# Patient Record
Sex: FEMALE | Race: BLACK OR AFRICAN AMERICAN | Employment: UNEMPLOYED | URBAN - METROPOLITAN AREA
[De-identification: names, ages, dates, MRNs, and addresses within clinical notes are randomized per-mention and may not be internally consistent; named-entity substitution may affect disease eponyms.]

---

## 2017-11-01 ENCOUNTER — HOSPITAL ENCOUNTER (EMERGENCY)
Age: 37
Discharge: HOME | End: 2017-11-01
Attending: EMERGENCY MEDICINE
Payer: COMMERCIAL

## 2017-11-01 VITALS
OXYGEN SATURATION: 97 % | SYSTOLIC BLOOD PRESSURE: 121 MMHG | DIASTOLIC BLOOD PRESSURE: 95 MMHG | TEMPERATURE: 98.3 F | RESPIRATION RATE: 16 BRPM | HEART RATE: 69 BPM | HEIGHT: 66 IN | BODY MASS INDEX: 34.55 KG/M2 | WEIGHT: 215 LBS

## 2017-11-01 DIAGNOSIS — B37.2 CANDIDAL INTERTRIGO: ICD-10-CM

## 2017-11-01 DIAGNOSIS — L50.9 URTICARIA: Primary | ICD-10-CM

## 2017-11-01 LAB
CLUE CELLS VAG QL WET PREP: NORMAL
KOH PREP SPEC: NORMAL
SERVICE CMNT-IMP: NORMAL
T VAGINALIS VAG QL WET PREP: NORMAL

## 2017-11-01 PROCEDURE — 87491 CHLMYD TRACH DNA AMP PROBE: CPT | Performed by: PHYSICIAN ASSISTANT

## 2017-11-01 PROCEDURE — 99283 EMERGENCY DEPT VISIT LOW MDM: CPT

## 2017-11-01 PROCEDURE — 87210 SMEAR WET MOUNT SALINE/INK: CPT | Performed by: PHYSICIAN ASSISTANT

## 2017-11-01 RX ORDER — NYSTATIN 100000 [USP'U]/G
POWDER TOPICAL 3 TIMES DAILY
Qty: 30 G | Refills: 0 | Status: SHIPPED | OUTPATIENT
Start: 2017-11-01 | End: 2019-01-24 | Stop reason: SDUPTHER

## 2017-11-01 RX ORDER — HYDROCORTISONE 25 MG/G
CREAM TOPICAL 2 TIMES DAILY
Qty: 30 G | Refills: 0 | Status: SHIPPED | OUTPATIENT
Start: 2017-11-01 | End: 2020-03-19

## 2017-11-01 NOTE — DISCHARGE INSTRUCTIONS
Yeast Skin Infection: Care Instructions  Your Care Instructions    Yeast normally lives on your skin. Sometimes too much yeast can overgrow in certain areas of the skin and cause an infection. The infection causes red, scaly, moist patches on your skin that may itch. Common areas for skin yeast infections are skin folds under the breasts or belly area. The warm and moist areas in the skin folds can make it easier for yeast to overgrow. Yeast infections also can be found on other parts of the body such as the groin or armpits. You will probably get a cream or ointment that contains an antifungal medicine. Examples of these are miconazole and clotrimazole. You put it on your skin to treat the infection. Your doctor may give you a prescription for the cream or ointment. Or you may be able to buy it without a prescription at most drugstores. If the infection is severe, the doctor will prescribe antifungal pills. A yeast infection usually goes away after about a week of treatment. But it's important to use the medicine for as long as your doctor tells you to. Follow-up care is a key part of your treatment and safety. Be sure to make and go to all appointments, and call your doctor if you are having problems. It's also a good idea to know your test results and keep a list of the medicines you take. How can you care for yourself at home? · Be safe with medicines. Take your medicines exactly as prescribed. Call your doctor if you think you are having a problem with your medicine. · Keep your skin clean and dry. Your doctor may suggest using powder that contains an antifungal medicine in the skin folds. · Wear loose clothing. When should you call for help? Call your doctor now or seek immediate medical care if:  ? · You have symptoms of infection, such as:  ¨ Increased pain, swelling, warmth, or redness. ¨ Red streaks leading from the area. ¨ Pus draining from the area. ¨ A fever. ? Watch closely for changes in your health, and be sure to contact your doctor if:  ? · You do not get better as expected. Where can you learn more? Go to http://sarmad-farhana.info/. Enter K147 in the search box to learn more about \"Yeast Skin Infection: Care Instructions. \"  Current as of: October 13, 2016  Content Version: 11.4  © 8083-3639 [employer]. Care instructions adapted under license by [employer] (which disclaims liability or warranty for this information). If you have questions about a medical condition or this instruction, always ask your healthcare professional. [address] any warranty or liability for your use of this information.

## 2017-11-01 NOTE — ED TRIAGE NOTES
Pt c/o rash to groin x 1 month, minimal relief with hydrocortisone cream. +intermittent itching and clothing irritation. Pt also notes an area to her left neck with localized swelling and redness x 1 day. Afebrile.

## 2017-11-01 NOTE — ED PROVIDER NOTES
HPI Comments: 40 y/o female with PMHx of anemia, cholelithiasis, seizures, scoliosis and depression, presenting with complaint of rash. She reports a 1 month history of a bilateral groin itchy rash. She states she has had a similar rash in the past and was told to use hydrocortisone cream, but this time the cream has not been helping. She has also tried different lotions and switching laundry detergents, with no relief. The rash is not painful. She denies vaginal itching, vaginal pain, vaginal discharge, dysuria, hematuria or urgency/frequency. The patient also complains of an itchy rash on the left side of her neck, which she first noticed yesterday. She does not recall any recent insect bites. The rash on her neck is not painful, and it has improved somewhat since onset yesterday. No fevers, chills, shortness of breath, cough, abd pain, nausea or vomiting. The history is provided by the patient. Past Medical History:   Diagnosis Date    Depression     Ill-defined condition     scoliosis, gall stones, anemia    Seizures ([address]        Past Surgical History:   Procedure Laterality Date    HX  SECTION      x 4    HX HERNIA REPAIR      HX TUBAL LIGATION           History reviewed. No pertinent family history. Social History     Social History    Marital status: SINGLE     Spouse name: N/A    Number of children: N/A    Years of education: N/A     Occupational History    Not on file. Social History Main Topics    Smoking status: Never Smoker    Smokeless tobacco: Not on file    Alcohol use No    Drug use: No    Sexual activity: Not on file     Other Topics Concern    Not on file     Social History Narrative         ALLERGIES: Other food; Dilaudid [hydromorphone (bulk)]; Morphine; Tramadol; and Percocet [oxycodone-acetaminophen]    Review of Systems   Constitutional: Negative for chills and fever. Respiratory: Negative for cough and shortness of breath.     Cardiovascular: Negative for chest pain. Gastrointestinal: Negative for abdominal pain, nausea and vomiting. Genitourinary: Negative for dysuria, frequency, hematuria, urgency, vaginal bleeding, vaginal discharge and vaginal pain. Musculoskeletal: Negative for myalgias. Skin: Positive for rash. Neurological: Negative for syncope, light-headedness and headaches. All other systems reviewed and are negative. Vitals:    11/01/17 0850   BP: 114/75   Pulse: 63   Resp: 14   Temp: 98.3 °F (36.8 °C)   SpO2: 100%   Weight: 97.5 kg (215 lb)   Height: 5' 6\" (1.676 m)            Physical Exam   Constitutional: She is oriented to person, place, and time. She appears well-developed and well-nourished. No distress. HENT:   Head: Normocephalic and atraumatic. Eyes: Conjunctivae and EOM are normal.   Neck: Normal range of motion. Neck supple. Cardiovascular: Normal rate. Pulmonary/Chest: Effort normal. No respiratory distress. Genitourinary: Uterus normal. There is no tenderness or lesion on the right labia. There is no tenderness or lesion on the left labia. Cervix exhibits no motion tenderness and no discharge. Right adnexum displays no mass, no tenderness and no fullness. Left adnexum displays no mass, no tenderness and no fullness. No erythema, tenderness or bleeding in the vagina. No foreign body in the vagina. No signs of injury around the vagina. No vaginal discharge found. Musculoskeletal: Normal range of motion. Neurological: She is alert and oriented to person, place, and time. Skin: Skin is warm and dry. She is not diaphoretic. Left side of neck with few urticaria, non-tender to palpation. No fluctuance, induration, drainage, excoriations, ulcers or vesicles. Groin with diffuse erythema/hyperpigmentation, few scattered papules, excoriations present. No tenderness to palpation, swelling, induration, fluctuance, drainage, ulcers or vesicles. Nursing note and vitals reviewed.        MDM  Number of Diagnoses or Management Options  Candidal intertrigo:   Urticaria:      Amount and/or Complexity of Data Reviewed  Clinical lab tests: ordered and reviewed    Patient Progress  Patient progress: stable    ED Course       Procedures    38 y/o female with PMHx of anemia, cholelithiasis, seizures, scoliosis and depression, presenting with complaint of rash. History and exam as above - urticarial rash on neck consistent with insect bite vs localized allergic reaction to unknown allergen. Groin rash most consistent with candidiasis. Will send KOH, wet prep, GC/Chlamydia. KOH negative, wet prep negative for BV or trichomonas. Safe for discharge home, with Rx for Nystatin powder and hydrocortisone cream for neck. Recommended PCP follow up as needed. Strict ED return precautions discussed and provided in writing at time of discharge. The patient verbalized understanding and agreement with this plan.

## 2017-11-01 NOTE — LETTER
[address] 
[facility] EMERGENCY DEPT 
[address] [facility] [address] [id number] [phone] Work/School Note Date: 11/1/2017 To Whom It May concern: 
 
Cory Owens was seen and treated today in the emergency room by the following provider(s): 
Attending Provider: Anibal Urias MD 
Physician Assistant: JOHN Briggs. Cory Owens may return to work on 11/2/17. Sincerely, JOHN Briggs

## 2017-11-03 LAB
C TRACH DNA SPEC QL NAA+PROBE: NEGATIVE
N GONORRHOEA DNA SPEC QL NAA+PROBE: NEGATIVE
SAMPLE TYPE: NORMAL
SERVICE CMNT-IMP: NORMAL
SPECIMEN SOURCE: NORMAL

## 2018-09-14 ENCOUNTER — APPOINTMENT (OUTPATIENT)
Dept: MRI IMAGING | Age: 38
End: 2018-09-14
Attending: FAMILY MEDICINE
Payer: COMMERCIAL

## 2018-09-14 ENCOUNTER — HOSPITAL ENCOUNTER (EMERGENCY)
Age: 38
Discharge: HOME | End: 2018-09-14
Attending: EMERGENCY MEDICINE
Payer: COMMERCIAL

## 2018-09-14 ENCOUNTER — HOSPITAL ENCOUNTER (EMERGENCY)
Age: 38
Discharge: STILL ADMITTED OR ADMINISTRATIVE | End: 2018-09-14
Attending: EMERGENCY MEDICINE
Payer: COMMERCIAL

## 2018-09-14 VITALS
BODY MASS INDEX: 35.36 KG/M2 | TEMPERATURE: 98.2 F | OXYGEN SATURATION: 99 % | WEIGHT: 220 LBS | SYSTOLIC BLOOD PRESSURE: 120 MMHG | RESPIRATION RATE: 16 BRPM | HEIGHT: 66 IN | HEART RATE: 64 BPM | DIASTOLIC BLOOD PRESSURE: 70 MMHG

## 2018-09-14 DIAGNOSIS — H20.9 ANTERIOR UVEITIS: Primary | ICD-10-CM

## 2018-09-14 DIAGNOSIS — H57.13 PAIN OF BOTH EYES: ICD-10-CM

## 2018-09-14 PROCEDURE — A9575 INJ GADOTERATE MEGLUMI 0.1ML: HCPCS | Performed by: RADIOLOGY

## 2018-09-14 PROCEDURE — 70553 MRI BRAIN STEM W/O & W/DYE: CPT

## 2018-09-14 PROCEDURE — 99283 EMERGENCY DEPT VISIT LOW MDM: CPT

## 2018-09-14 PROCEDURE — 75810000275 HC EMERGENCY DEPT VISIT NO LEVEL OF CARE

## 2018-09-14 PROCEDURE — 74011250637 HC RX REV CODE- 250/637: Performed by: EMERGENCY MEDICINE

## 2018-09-14 PROCEDURE — 74011250636 HC RX REV CODE- 250/636: Performed by: RADIOLOGY

## 2018-09-14 RX ORDER — GADOTERATE MEGLUMINE 376.9 MG/ML
19 INJECTION INTRAVENOUS
Status: COMPLETED | OUTPATIENT
Start: 2018-09-14 | End: 2018-09-14

## 2018-09-14 RX ORDER — HYDROCODONE BITARTRATE AND ACETAMINOPHEN 7.5; 325 MG/15ML; MG/15ML
5 SOLUTION ORAL ONCE
Status: COMPLETED | OUTPATIENT
Start: 2018-09-14 | End: 2018-09-14

## 2018-09-14 RX ADMIN — HYDROCODONE BITARTRATE AND ACETAMINOPHEN 5 MG: 7.5; 325 SOLUTION ORAL at 15:10

## 2018-09-14 RX ADMIN — GADOTERATE MEGLUMINE 19 ML: 376.9 INJECTION INTRAVENOUS at 19:33

## 2018-09-14 NOTE — LETTER
NOTIFICATION RETURN TO WORK / SCHOOL 
 
9/14/2018 8:29 PM 
 
Ms. Socorro Wall [address] [place] [id number] To Whom It May Concern: 
 
Socorro Wall is currently under the care of [facility] EMERGENCY DEPT. Please forgive any absence on 9/14/18 and 9/17/18. If there are questions or concerns please have the patient contact our office. Sincerely, Srinivasan Mendoza MD

## 2018-09-14 NOTE — ED NOTES
Patient with bilateral eye pain and loss of vision - worse in the right. Evaluated at [facility] with dilated exam and sent in for stat MRI of brain and MRI of orbit by Dr. Flora Steinberg for optic neuritis and sent from MRI to the ED because they did not have an order/scheduled appt. 
 

## 2018-09-14 NOTE — ED PROVIDER NOTES
HPI Comments: Since  patient had development of eye pain and changes in vision of the right eye. Noting pain in both eyes, but pain worst in the right eye and changes in the vision in the right eye only. Pain exacerbated with palpation and with movement of the right eye, and bright lights. Denies fevers, chills, neck pain, ear pain, sore throat. Denies any trauma, sick contacts. Denies history of diabetes or inflammatory disease. 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 Discussed with Ophthamologist (Dr. Christian Monge). Highest suspicion for anterior Uveitis though with pain component needs to rule out MS and optic Neuritis. Wanting MRI of the Brain and Orbit w/wo contrast.  If no other pathology patient prescribed topical steroid. Patient is a 40 y.o. female presenting with eye pain. The history is provided by the patient. Eye Pain This is a new problem. Episode onset: 1 week ago. The problem occurs constantly. The problem has been gradually worsening. Both (Right worse than left) eyes are affected. The injury mechanism was none. The pain is at a severity of 9/10 (Pressure pain). There is no history of trauma to the eye. There is no known exposure to pink eye. Associated symptoms include photophobia and pain. Pertinent negatives include no discharge, no nausea, no vomiting, no weakness and no fever. Past Medical History:  
Diagnosis Date  Depression  Ill-defined condition   
 scoliosis, gall stones, anemia  Seizures ([address] Past Surgical History:  
Procedure Laterality Date  HX  SECTION    
 x 4  
 HX HERNIA REPAIR    
 HX TUBAL LIGATION No family history on file. Social History Social History  Marital status: SINGLE Spouse name: N/A  
 Number of children: N/A  
 Years of education: N/A Occupational History  Not on file. Social History Main Topics  Smoking status: Never Smoker  Smokeless tobacco: Not on file  Alcohol use No  
 Drug use: No  
 Sexual activity: Not on file Other Topics Concern  Not on file Social History Narrative ALLERGIES: Other food; Dilaudid [hydromorphone (bulk)]; Morphine; Tramadol; and Percocet [oxycodone-acetaminophen] Review of Systems Constitutional: Negative for chills, fatigue and fever. HENT: Negative for congestion. Eyes: Positive for photophobia, pain and visual disturbance. Negative for discharge. Cardiovascular: Negative for chest pain. Gastrointestinal: Negative for abdominal pain, nausea and vomiting. Genitourinary: Negative for dysuria. Musculoskeletal: Negative for arthralgias. Skin: Negative for rash. Neurological: Positive for headaches. Negative for seizures, weakness and light-headedness. Psychiatric/Behavioral: Negative for confusion and hallucinations. Vitals:  
 09/14/18 1426 BP: 111/76 Pulse: 66 Resp: 16 Temp: 98.2 °F (36.8 °C) SpO2: 99% Weight: 99.8 kg (220 lb) Height: 5' 6\" (1.676 m) Physical Exam  
Constitutional: She is oriented to person, place, and time. She appears well-developed and well-nourished. No distress. HENT:  
Head: Normocephalic and atraumatic. Right Ear: Tympanic membrane and ear canal normal.  
Left Ear: Tympanic membrane and ear canal normal.  
Nose: Right sinus exhibits no maxillary sinus tenderness and no frontal sinus tenderness. Left sinus exhibits no maxillary sinus tenderness and no frontal sinus tenderness. Mouth/Throat: Oropharynx is clear and moist.  
Eyes: Conjunctivae, EOM and lids are normal.  
Fundoscopic exam: The right eye shows no papilledema. The left eye shows no papilledema. Eyes previously dilated. Decreased visual acuity right compared to left, Can see objects with right Neck: Normal range of motion. Neck supple.   
Cardiovascular: Normal rate, regular rhythm, normal heart sounds and intact distal pulses. Exam reveals no gallop and no friction rub. No murmur heard. Pulmonary/Chest: Effort normal and breath sounds normal.  
Abdominal: Soft. Bowel sounds are normal. There is no tenderness. Lymphadenopathy:  
  She has no cervical adenopathy. Neurological: She is alert and oriented to person, place, and time. No sensory deficits except for blurred vision Skin: Skin is warm and dry. Nursing note and vitals reviewed. MDM Number of Diagnoses or Management Options Anterior uveitis:  
Pain of both eyes:  
Diagnosis management comments: Rule out MS/Optic Neuritis. Ordering MRI brain with view of Orbits now after discussion with Dr. Sally Reyna (ophthamology). MRI with no acute pathology related to optic nerve, acute orbital pathology. Noting some hyperintensities in the left Parietal white matter. Reviewed with Radiology and stated not an uncommon finding in younger patients, not symptomatic. At this time no recommended follow up imaging or specialist follow up related to findings. Patient Discharged with topical steroid per Ophthamologist and recommended close follow up with PCP and Ophthamologist. 
 
Patient's results have been reviewed with them. Patient and/or family have verbally conveyed their understanding and agreement of the patient's signs, symptoms, diagnosis, treatment and prognosis and additionally agree to follow up as recommended or return to the Emergency Room should their condition change prior to follow-up. Discharge instructions have also been provided to the patient with some educational information regarding their diagnosis as well a list of reasons why they would want to return to the ER prior to their follow-up appointment should their condition change. Amount and/or Complexity of Data Reviewed Tests in the radiology section of CPT®: ordered and reviewed (MRI Brain) Discussion of test results with the performing providers: yes (MRI) Obtain history from someone other than the patient: yes (Dr. Pascale Noble) Discuss the patient with other providers: yes (Dr. Pascale Noble (ophthamology)) Risk of Complications, Morbidity, and/or Mortality Presenting problems: high Diagnostic procedures: moderate ED Course Procedures

## 2018-09-14 NOTE — ED TRIAGE NOTES
Pt with bilateral eye pain x 1 week. Pt was seen at [facility] for the same and followed up opthalo today. Pt sent to ER for stat MRI for optic neuritis.

## 2018-09-15 NOTE — DISCHARGE INSTRUCTIONS
We hope that we have addressed all of your medical concerns. The examination and treatment you received in the Emergency Department were for an emergent problem and were not intended as complete care. It is important that you follow up with your healthcare provider(s) for ongoing care. If your symptoms worsen or do not improve as expected, and you are unable to reach your usual health care provider(s), you should return to the Emergency Department. Today's healthcare is undergoing tremendous change, and patient satisfaction surveys are one of the many tools to assess the quality of medical care. You may receive a survey from the [employer] organization regarding your experience in the Emergency Department. I hope that your experience has been completely positive, particularly the medical care that I provided. As such, please participate in the survey; anything less than excellent does not meet my expectations or intentions. Thank you for allowing us to provide you with medical care today. We realize that you have many choices for your emergency care needs. Please choose us in the future for any continued health care needs. No results found for this or any previous visit (from the past 24 hour(s)). Mri Brain W Wo Cont    Result Date: 9/14/2018  EXAM: MRI BRAIN INDICATION:   Include scan of orbits bilaterally. Rule out MS and Optic Neuritis per Ophthalmology   Vision loss SEQUENCES:   Sagittal T1, axial T1, T2, GRE and FLAIR; axial and coronal T1 post enhancement; and diffusion imaging of the brain. 19 mL of Dotarem. Thin section imaging is also performed through the orbits with and without contrast. FINDINGS: Orbital soft tissues, optic nerves and chiasm are normal in size, morphology, signal intensity and enhancement. There are a few punctate white matter foci of T2 hyperintensity in the left parietal lobe, nonspecific.  Remainder of the brain including corpus callosum and pericallosal white matter show normal signal intensity. The enhancement is normal.  There is no evidence for mass, hemorrhage, shift, or hydrocephalus. There is no extra-axial fluid collection. Diffusion imaging shows no diffusion restriction to indicate acute infarct/ischemia or other process. IMPRESSION: Few nonspecific left parietal white matter T2 hyperintensities. Normal appearance of the orbits. Normal diffusion and enhancement. Uveitis: Care Instructions  Your Care Instructions    Uveitis (say \"you-vee-EYE-tus\") is swelling and tenderness of the middle layer of the eye. This area includes the colored part of the eye (iris), muscles, and blood vessels. One or both of your eyes may be swollen, red, and painful. You may have blurred vision. You may have gotten uveitis from an infection, but the cause is often not known. There are three types of uveitis. · Anterior uveitis is the most common type. This is pain and swelling of the front part of the eye. It is treated with eyedrops or ointment and usually lasts less than 6 weeks. · Intermediate uveitis affects the middle of the eye. It may be treated with eyedrops or with medicine given in a shot. It usually lasts longer than 6 weeks. · Posterior uveitis is the least common type. It affects the back of the eye. This is usually treated with medicine. It can last from a few weeks to a few years. It is important to treat uveitis. Treatment can save your eyesight and avoid permanent damage to your eyes. Follow-up care is a key part of your treatment and safety. Be sure to make and go to all appointments, and call your doctor if you are having problems. It's also a good idea to know your test results and keep a list of the medicines you take. How can you care for yourself at home? · Take your medicines exactly as prescribed. Call your doctor if you have any problems with your medicine.  You will get more details on the specific medicines your doctor prescribes. · Use any prescribed eyedrops or ointments exactly as your doctor told you to. · Keep the eyedropper or bottle tip clean. · If you are using eyedrops and an ointment, put in the eyedrops before you use the ointment. · To put in eyedrops or ointment:  ¨ Tilt your head back, and pull your lower eyelid down with one finger. ¨ Drop or squirt the medicine inside the lower lid. ¨ Close your eye for 30 to 60 seconds to let the drops or ointment move around. ¨ Do not touch the ointment or dropper tip to your eyelashes or any other surface. · Wear sunglasses if light hurts your eyes. · Do not wear contact lenses until your eyes have healed. · Do not wear eye makeup until your eyes have healed. · Do not drive if you have blurred vision. When should you call for help? Call your doctor now or seek immediate medical care if:    · You have signs of an eye infection, such as:  ¨ Pus or thick discharge coming from the eye. ¨ Redness or swelling around the eye. ¨ A fever.     · You have new or worse eye pain.     · You have vision changes.     · It feels like there is something in your eye.     · Light hurts your eye.    Watch closely for changes in your health, and be sure to contact your doctor if:    · You do not get better as expected. Where can you learn more? Go to http://sarmad-farhana.info/. Enter K211 in the search box to learn more about \"Uveitis: Care Instructions. \"  Current as of: December 3, 2017  Content Version: 11.7  © 1049-3058 [employer]. Care instructions adapted under license by [employer] (which disclaims liability or warranty for this information). If you have questions about a medical condition or this instruction, always ask your healthcare professional. [address] any warranty or liability for your use of this information.

## 2018-09-15 NOTE — ED NOTES
Provided with DC instructions by provider. Verbalized understanding. No IV in place upon this RN entering room. Gauze and tape noted to L hand. PT reports, Conchita Morin already took it out\". VS updated as charted. Ambulated out of ED with steady upright gait

## 2018-10-08 ENCOUNTER — OFFICE VISIT (OUTPATIENT)
Dept: NEUROLOGY | Age: 38
End: 2018-10-08

## 2018-10-08 VITALS
WEIGHT: 226 LBS | SYSTOLIC BLOOD PRESSURE: 108 MMHG | DIASTOLIC BLOOD PRESSURE: 68 MMHG | OXYGEN SATURATION: 95 % | HEART RATE: 79 BPM | BODY MASS INDEX: 36.48 KG/M2

## 2018-10-08 DIAGNOSIS — G43.111 INTRACTABLE MIGRAINE WITH AURA WITH STATUS MIGRAINOSUS: Primary | ICD-10-CM

## 2018-10-08 RX ORDER — PROPRANOLOL HYDROCHLORIDE 10 MG/1
10 TABLET ORAL
Refills: 4 | COMMUNITY
Start: 2018-09-19 | End: 2020-03-19

## 2018-10-08 RX ORDER — BUPROPION HYDROCHLORIDE 100 MG/1
100 TABLET, EXTENDED RELEASE ORAL 2 TIMES DAILY
Refills: 1 | COMMUNITY
Start: 2018-09-19 | End: 2019-08-19

## 2018-10-08 RX ORDER — INDOMETHACIN 50 MG/1
50 CAPSULE ORAL 3 TIMES DAILY
Qty: 90 CAP | Refills: 2 | Status: SHIPPED | OUTPATIENT
Start: 2018-10-08 | End: 2018-10-09 | Stop reason: SDUPTHER

## 2018-10-08 RX ORDER — PREDNISOLONE ACETATE 10 MG/ML
1 SUSPENSION/ DROPS OPHTHALMIC
Refills: 1 | COMMUNITY
Start: 2018-09-14 | End: 2019-01-24

## 2018-10-08 RX ORDER — BECLOMETHASONE DIPROPIONATE HFA 40 UG/1
40 AEROSOL, METERED RESPIRATORY (INHALATION) 2 TIMES DAILY
Refills: 1 | COMMUNITY
Start: 2018-09-19 | End: 2020-03-19

## 2018-10-08 NOTE — PATIENT INSTRUCTIONS
[facility]   Statement to Patients  April 1, 2014      In an effort to ensure the large volume of patient prescription refills is processed in the most efficient and expeditious manner, we are asking our patients to assist us by calling your Pharmacy for all prescription refills, this will include also your  Mail Order Pharmacy. The pharmacy will contact our office electronically to continue the refill process. Please do not wait until the last minute to call your pharmacy. We need at least 48 hours (2days) to fill prescriptions. We also encourage you to call your pharmacy before going to  your prescription to make sure it is ready. With regard to controlled substance prescription refill requests (narcotic refills) that need to be picked up at our office, we ask your cooperation by providing us with at least 72 hours (3days) notice that you will need a refill. We will not refill narcotic prescription refill requests after 4:00pm on any weekday, Monday through Thursday, or after 2:00pm on Fridays, or on the weekends. We encourage everyone to explore another way of getting your prescription refill request processed using Central Desktop, our patient web portal through our electronic medical record system. Central Desktop is an efficient and effective way to communicate your medication request directly to the office and  downloadable as an jono on your smart phone . [employer] also features a review functionality that allows you to view your medication list as well as leave messages for your physician. Are you ready to get connected? If so please review the attatched instructions or speak to any of our staff to get you set up right away! Thank you so much for your cooperation. Should you have any questions please contact our Practice Administrator.     The Physicians and Staff,  [facility]

## 2018-10-08 NOTE — LETTER
10/8/2018 2:57 PM 
 
Patient:  Gala Leonard YOB: 1980 Date of Visit: 10/8/2018 Dear MD [facility] [address] [place] [id number] VIA Facsimile: [phone] 
 : 
 
 
Thank you for referring Ms. Gala Leonard to me for evaluation/treatment. Below are the relevant portions of my assessment and plan of care. If you have questions, please do not hesitate to call me. I look forward to following Ms. Roxanne Wood along with you. Sincerely, Brenden Vargas MD

## 2018-10-08 NOTE — PROGRESS NOTES
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 HPI:     Since Sept 2018, patient noted blurred vision of the R eye, described as there is a film in the eyes. (+) severe headache R frontal and R temple area, R periorbital, shooting to back of head, occurring every day, throbbing, constant, 8/10, no aggravating/relivieng factor (+) nausea (-) vomiting (+) photophobia (-) phonophobia. Patient saw Dr Octavio Bence (ophtalmologist) who said everything was fine, but recommended to see a neurologist.    Patient was placed on Propranolol and Wellbutrin with no clear benefit. Patient was seen at ER with MRI brain showing non-specific white matter changes    (+) weight gain. ROS   (-) fever  (-) rash  All other systems reviewed and are negative    Past Medical Hx  Past Medical History:   Diagnosis Date    Arthritis     Depression     Headache     Ill-defined condition     scoliosis, gall stones, anemia            Social Hx  Social History     Social History    Marital status: SINGLE     Spouse name: N/A    Number of children: N/A    Years of education: N/A     Social History Main Topics    Smoking status: Never Smoker    Smokeless tobacco: Never Used    Alcohol use No    Drug use: No    Sexual activity: Not Asked     Other Topics Concern    None     Social History Narrative       Family Hx  History reviewed. No pertinent family history.    Sister and mother - migraines    ALLERGIES  Allergies   Allergen Reactions    Other Food Swelling     All nuts except peanuts    Dilaudid [Hydromorphone (Bulk)] Rash    Morphine Rash    Tramadol Nausea and Vomiting    Marcy Swelling     Swollen lips    Percocet [Oxycodone-Acetaminophen] Rash     Pt tolerated a low dose of 5mg/325mg       CURRENT MEDS  Current Outpatient Prescriptions   Medication Sig Dispense Refill    buPROPion SR (WELLBUTRIN SR) 100 mg SR tablet Take 100 mg by mouth two (2) times a day. 1    propranolol (INDERAL) 10 mg tablet Take 10 mg by mouth nightly. 4    QVAR REDIHALER 40 mcg/actuation HFAb inhaler Take 40 mcg by inhalation two (2) times a day. 1    prednisoLONE acetate (PRED FORTE) 1 % ophthalmic suspension Apply 1 Drop to eye four (4) days a week. 1    indomethacin (INDOCIN) 50 mg capsule Take 1 Cap by mouth three (3) times daily for 90 days. Prn for headache. Take with meals 90 Cap 2    nystatin (MYCOSTATIN) powder Apply  to affected area three (3) times daily. Until rash is resolved. 30 g 0    hydrocortisone (HYTONE) 2.5 % topical cream Apply  to affected area two (2) times a day. 30 g 0    sertraline (ZOLOFT) 50 mg tablet Take 50 mg by mouth daily.  ondansetron (ZOFRAN ODT) 4 mg disintegrating tablet Take 1 Tab by mouth every eight (8) hours as needed for Nausea. 9 Tab 0           PREVIOUS WORKUP: (reviewed)  IMAGING:    CT Results (recent):    Results from Hospital Encounter encounter on 10/16/16   CT HEAD WO CONT   Narrative INDICATION:   right sided weakness        EXAM:  HEAD CT WITHOUT CONTRAST    COMPARISON:  None    TECHNIQUE:  Routine noncontrast axial head CT was performed. Sagittal and  coronal reconstructions were generated. CT dose reduction was achieved through use of a standardized protocol tailored  for this examination and automatic exposure control for dose modulation. FINDINGS:    The ventricles are midline without hydrocephalus. There is no acute intra or  extra-axial hemorrhage. There is no significant white matter disease. The  basal cisterns are patent. The paranasal sinuses are clear. Impression IMPRESSION:    No acute process.           MRI Results (recent):    Results from [place] encounter on 09/14/18   MRI BRAIN W WO CONT   Narrative EXAM: MRI BRAIN     INDICATION:   Include scan of orbits bilaterally. Rule out MS and Optic  Neuritis per Ophthalmology   Vision loss     SEQUENCES:   Sagittal T1, axial T1, T2, GRE and FLAIR; axial and coronal T1 post  enhancement; and diffusion imaging of the brain. 19 mL of Dotarem. Thin section  imaging is also performed through the orbits with and without contrast.    FINDINGS:     Orbital soft tissues, optic nerves and chiasm are normal in size, morphology,  signal intensity and enhancement. There are a few punctate white matter foci of T2 hyperintensity in the left  parietal lobe, nonspecific. Remainder of the brain including corpus callosum and  pericallosal white matter show normal signal intensity. The enhancement is  normal.      There is no evidence for mass, hemorrhage, shift, or hydrocephalus. There is no  extra-axial fluid collection. Diffusion imaging shows no diffusion restriction  to indicate acute infarct/ischemia or other process. Impression IMPRESSION: Few nonspecific left parietal white matter T2 hyperintensities. Normal appearance of the orbits. Normal diffusion and enhancement. IR Results (recent):  No results found for this or any previous visit. VAS/US Results (recent):  No results found for this or any previous visit. LABS (reviewed)  Results for orders placed or performed during the hospital encounter of 11/01/17   IDALMIS, OTHER SOURCES   Result Value Ref Range    Special Requests: NO SPECIAL REQUESTS      KOH NO YEAST SEEN     WET PREP   Result Value Ref Range    Clue cells CLUE CELLS ABSENT      Wet prep NO TRICHOMONAS SEEN     CHLAMYDIA/GC PCR   Result Value Ref Range    Sample type SWAB      Source ENDOCERVICAL      Chlamydia amplified NEGATIVE  NEG      N. gonorrhea, amplified NEGATIVE  NEG      Comment        Testing performed by the Roche Altagracia CT/NG method, utilizing PCR amplification to identify DNA of the pathogens.   This method is not recommended as the sole method of evaluation of cases of sexual abuse nor for other medico-legal indications. Physical Exam:     Visit Vitals    /68    Pulse 79    Wt 102.5 kg (226 lb)    LMP 09/09/2018    SpO2 95%    BMI 36.48 kg/m2     General:  Alert, cooperative, no distress. Head:  Normocephalic, without obvious abnormality, atraumatic. Eyes:  Conjunctivae/corneas clear. Lungs:  Heart:   Non labored breathing  Regular rate and rhythm, no carotid bruits   Abdomen:   Soft, non-distended   Extremities: Extremities normal, atraumatic, no cyanosis or edema. Pulses: 2+ and symmetric all extremities. Skin: Skin color, texture, turgor normal. No rashes or lesions. Neurologic Exam     Gen: Attention normal             Language: naming, repetition, fluency normal             Memory: intact recent and remote memory  Cranial Nerves:  I: smell Not tested   II: visual fields Full to confrontation   II: pupils Equal, round, reactive to light   II: optic disc No papilledema   III,VII: ptosis none   III,IV,VI: extraocular muscles  Full ROM   V: mastication normal   V: facial light touch sensation  normal   VII: facial muscle function   symmetric   VIII: hearing symmetric   IX: soft palate elevation  normal   XI: trapezius strength  5/5   XI: sternocleidomastoid strength 5/5   XI: neck flexion strength  5/5   XII: tongue  midline     Motor: normal bulk and tone, no tremor              Strength: 5/5 all four extremities  Sensory: intact to LT, PP, vibration, and JPS  Reflexes: 2+ throughout; Down going toes  Coordination: Good FTN and HTS  Gait: normal gait including tandem            Impression:     Jason Car is a 40 y.o. female who  has a past medical history of Arthritis; Depression; Headache; Ill-defined condition; and Seizures ([facility] [address]  who in Sept 2018, noted blurred vision of the R eye, described as there is a film in the eyes and severe headache R frontal and R temple area, R periorbital, shooting to back of head, occurring every day, throbbing, constant, 8/10, no aggravating/relivieng factor with nausea and photophobia. Considerations include migraine, with visual auras, intractable, hemicrania continua and pseudotumor cerebri. Patient saw Dr Praful Penny (ophtalmologist) who said everything was fine. Patient was placed on Propranolol and Wellbutrin with no clear benefit. Patient was seen at ER with MRI brain showing non-specific white matter changes. RECOMMENDATIONS  1. I had a long discussion with patient and mother. Discussed diagnosis, prognosis, pathophysiology and available treatment. Reviewed test results. All questions were answered. 2. Trial of Indomethacin to see if headache responds to this  3. Refer for Chema to break headache cycle  4. If no improvement despite above, will consider doing spinal tap to check for elevated opening pressure  5. Discussed the need for headache diary and went through the list that can trigger headache  6. Reviewed MRI brain films and medical records in EPIC    Follow-up Disposition:  Return in about 1 week (around 10/15/2018).       Thank you for the consultation      Elbert Restrepo MD  Diplomate, American Board of Psychiatry and Neurology  Diplomate, Neuromuscular Medicine  Diplomate, American Board of Electrodiagnostic Medicine        CC: Cem Parsons MD  Fax: [phone]

## 2018-10-08 NOTE — MR AVS SNAPSHOT
[address] [place] [address] [phone] Patient: Amalia Wood MRN: [id number] UNT:[id number] Visit Information Date & Time Provider Department Dept. Phone Encounter #  
 10/8/2018  2:00 PM Deepika Gordon MD [facility] [phone] [id number] Follow-up Instructions Return in about 1 week (around 10/15/2018). Upcoming Health Maintenance Date Due DTaP/Tdap/Td series (1 - Tdap) 11/18/2001 PAP AKA CERVICAL CYTOLOGY 11/18/2001 Influenza Age 5 to Adult 8/1/2018 Allergies as of 10/8/2018  Review Complete On: 10/8/2018 By: Deepika Gordon MD  
  
 Severity Noted Reaction Type Reactions Other Food  07/10/2014    Swelling All nuts except peanuts Dilaudid [Hydromorphone (Bulk)] Medium 09/15/2016   Systemic Rash Morphine Medium 07/10/2014   Systemic Rash Tramadol Medium 09/15/2016   Systemic Nausea and Vomiting [facility]  10/08/2018    Swelling Swollen lips Percocet [Oxycodone-acetaminophen] Low 07/10/2014   Systemic Rash Pt tolerated a low dose of 5mg/325mg Current Immunizations  Never Reviewed No immunizations on file. Not reviewed this visit You Were Diagnosed With   
  
 Codes Comments Intractable migraine with aura with status migrainosus    -  Primary ICD-10-CM: L61.836 ICD-9-CM: 346.03 Vitals BP Pulse Weight(growth percentile) LMP SpO2 BMI  
 108/68 79 226 lb (102.5 kg) 09/09/2018 95% 36.48 kg/m2 OB Status Smoking Status Having regular periods Never Smoker BMI and BSA Data Body Mass Index Body Surface Area  
 36.48 kg/m 2 2.18 m 2 Preferred Pharmacy Pharmacy Name Phone [id number] [place], [address] AT [facility] [phone] Your Updated Medication List  
  
   
 This list is accurate as of 10/8/18  2:51 PM.  Always use your most recent med list.  
  
  
  
  
 buPROPion  mg SR tablet Commonly known as:  Celestia Courts Take 100 mg by mouth two (2) times a day. hydrocortisone 2.5 % topical cream  
Commonly known as:  HYTONE Apply  to affected area two (2) times a day. indomethacin 50 mg capsule Commonly known as:  INDOCIN Take 1 Cap by mouth three (3) times daily for 90 days. Prn for headache. Take with meals  
  
 nystatin powder Commonly known as:  MYCOSTATIN Apply  to affected area three (3) times daily. Until rash is resolved. ondansetron 4 mg disintegrating tablet Commonly known as:  ZOFRAN ODT Take 1 Tab by mouth every eight (8) hours as needed for Nausea. prednisoLONE acetate 1 % ophthalmic suspension Commonly known as:  PRED FORTE Apply 1 Drop to eye four (4) days a week. propranolol 10 mg tablet Commonly known as:  INDERAL Take 10 mg by mouth nightly. QVAR REDIHALER 40 mcg/actuation Hfab inhaler Generic drug:  beclomethasone dipropionate Take 40 mcg by inhalation two (2) times a day. ZOLOFT 50 mg tablet Generic drug:  sertraline Take 50 mg by mouth daily. Prescriptions Sent to Pharmacy Refills  
 indomethacin (INDOCIN) 50 mg capsule 2 Sig: Take 1 Cap by mouth three (3) times daily for 90 days. Prn for headache. Take with meals Class: Normal  
 Pharmacy: [address] [place] [address] #: [phone] Route: Oral  
  
Follow-up Instructions Return in about 1 week (around 10/15/2018). Patient Instructions PRESCRIPTION REFILL POLICY [facility] Statement to Patients April 1, 2014 In an effort to ensure the large volume of patient prescription refills is processed in the most efficient and expeditious manner, we are asking our patients to assist us by calling your Pharmacy for all prescription refills, this will include also your  Mail Order Pharmacy. The pharmacy will contact our office electronically to continue the refill process. Please do not wait until the last minute to call your pharmacy. We need at least 48 hours (2days) to fill prescriptions. We also encourage you to call your pharmacy before going to  your prescription to make sure it is ready. With regard to controlled substance prescription refill requests (narcotic refills) that need to be picked up at our office, we ask your cooperation by providing us with at least 72 hours (3days) notice that you will need a refill. We will not refill narcotic prescription refill requests after 4:00pm on any weekday, Monday through Thursday, or after 2:00pm on Fridays, or on the weekends. We encourage everyone to explore another way of getting your prescription refill request processed using Fit[employer], our patient web portal through our electronic medical record system. Fit[employer]xx is an efficient and effective way to communicate your medication request directly to the office and  downloadable as an jono on your smart phone . [employer] also features a review functionality that allows you to view your medication list as well as leave messages for your physician. Are you ready to get connected? If so please review the attatched instructions or speak to any of our staff to get you set up right away! Thank you so much for your cooperation. Should you have any questions please contact our Practice Administrator. The Physicians and Staff,  [facility] Introducing \[facility] [place] [facility]\"" & [facility]! Dear Campos Stein: Thank you for requesting a FitLinxx account. Our records indicate that you already have an active FitLinxx account. You can access your account anytime at [email or web address] Did you know that you can access your hospital and ER discharge instructions at any time in Radario? You can also review all of your test results from your hospital stay or ER visit. Additional Information If you have questions, please visit the Frequently Asked Questions section of the Radario website at [email or web address] Remember, Radario is NOT to be used for urgent needs. For medical emergencies, dial 911. Now available from your iPhone and Android! Please provide this summary of care documentation to your next provider. Your primary care clinician is listed as Kate Helms. If you have any questions after today's visit, please call [phone].

## 2018-10-09 ENCOUNTER — TELEPHONE (OUTPATIENT)
Dept: NEUROLOGY | Age: 38
End: 2018-10-09

## 2018-10-09 NOTE — TELEPHONE ENCOUNTER
----- Message from Eunice Zuluaga sent at 10/9/2018 12:14 PM EDT -----  Regarding: Dr. Maria Del Rosario Smith, requesting a prescription for medication \"Indomethacin\" 50 MG to be sent to the pharmacy located at [address] [place] [address] normal [facility] pharmacy does not have medication in stock and medication has been deleted. This information can be faxed to [phone].   Pvdf contact number [phone]

## 2018-10-09 NOTE — TELEPHONE ENCOUNTER
Refill: Ind[facility] [address] [place] [address] [facility].

## 2018-10-15 ENCOUNTER — OFFICE VISIT (OUTPATIENT)
Dept: NEUROLOGY | Age: 38
End: 2018-10-15

## 2018-10-15 VITALS
BODY MASS INDEX: 36.15 KG/M2 | DIASTOLIC BLOOD PRESSURE: 86 MMHG | SYSTOLIC BLOOD PRESSURE: 118 MMHG | OXYGEN SATURATION: 97 % | HEART RATE: 97 BPM | WEIGHT: 224 LBS

## 2018-10-15 DIAGNOSIS — G43.111 INTRACTABLE MIGRAINE WITH AURA WITH STATUS MIGRAINOSUS: Primary | ICD-10-CM

## 2018-10-15 PROBLEM — E66.01 SEVERE OBESITY (HCC): Status: ACTIVE | Noted: 2018-10-15

## 2018-10-15 RX ORDER — TOPIRAMATE 25 MG/1
25 TABLET ORAL
Qty: 30 TAB | Refills: 5 | Status: SHIPPED | OUTPATIENT
Start: 2018-10-15 | End: 2018-11-16

## 2018-10-15 NOTE — PROGRESS NOTES
Neurology Progress Note    Patient ID:  Jason Car  [id number]  87 y.o.  1980      Subjective:   History:  Jason Car is a 40 y.o. female who  has a past medical history of Arthritis; Depression; Headache; and Seizures ([facility] [address] who in Sept 2018, noted blurred vision of the R eye, described as there is a film in the eyes and severe headache R frontal and R temple area, R periorbital, shooting to back of head, occurring every day, throbbing, constant, 8/10, no aggravating/relivieng factor with nausea and photophobia. Considerations include migraine, with visual auras, intractable, hemicrania continua and pseudotumor cerebri. Patient saw Dr Donnell Goodpasture (ophtalmologist) who said everything was fine. Patient was placed on Propranolol and Wellbutrin with no clear benefit. Patient was seen at ER with MRI brain showing non-specific white matter changes.     Patient tried Indomethacin and had Chema with no clear benefit.       ROS:  Per HPI-  Otherwise the remainder of ROS was negative    Social Hx  Social History     Socioeconomic History    Marital status: SINGLE     Spouse name: Not on file    Number of children: Not on file    Years of education: Not on file    Highest education level: Not on file   Social Needs    Financial resource strain: Not on file    Food insecurity - worry: Not on file    Food insecurity - inability: Not on file   [employer] needs - medical: Not on file   [employer] needs - non-medical: Not on file   Occupational History    Not on file   Tobacco Use    Smoking status: Never Smoker    Smokeless tobacco: Never Used   Substance and Sexual Activity    Alcohol use: No    Drug use: No    Sexual activity: Not on file   Other Topics Concern    Not on file   Social History Narrative    Not on file       Meds:  Current Outpatient Medications on File Prior to Visit   Medication Sig Dispense Refill    indomethacin (INDOCIN) 50 mg capsule Take 1 Cap by mouth three (3) times daily for 90 days. Prn for headache. Take with meals 90 Cap 2    buPROPion SR (WELLBUTRIN SR) 100 mg SR tablet Take 100 mg by mouth two (2) times a day. 1    propranolol (INDERAL) 10 mg tablet Take 10 mg by mouth nightly. 4    QVAR REDIHALER 40 mcg/actuation HFAb inhaler Take 40 mcg by inhalation two (2) times a day. 1    prednisoLONE acetate (PRED FORTE) 1 % ophthalmic suspension Apply 1 Drop to eye four (4) days a week. 1    nystatin (MYCOSTATIN) powder Apply  to affected area three (3) times daily. Until rash is resolved. 30 g 0    hydrocortisone (HYTONE) 2.5 % topical cream Apply  to affected area two (2) times a day. 30 g 0    sertraline (ZOLOFT) 50 mg tablet Take 50 mg by mouth daily.  ondansetron (ZOFRAN ODT) 4 mg disintegrating tablet Take 1 Tab by mouth every eight (8) hours as needed for Nausea. 9 Tab 0     No current facility-administered medications on file prior to visit. Imaging:    CT Results (recent):  Results from Hospital Encounter encounter on 10/16/16   CT HEAD WO CONT    Narrative INDICATION:   right sided weakness        EXAM:  HEAD CT WITHOUT CONTRAST    COMPARISON:  None    TECHNIQUE:  Routine noncontrast axial head CT was performed. Sagittal and  coronal reconstructions were generated. CT dose reduction was achieved through use of a standardized protocol tailored  for this examination and automatic exposure control for dose modulation. FINDINGS:    The ventricles are midline without hydrocephalus. There is no acute intra or  extra-axial hemorrhage. There is no significant white matter disease. The  basal cisterns are patent. The paranasal sinuses are clear. Impression IMPRESSION:    No acute process. MRI Results (recent):  Results from [place] encounter on 09/14/18   MRI BRAIN W WO CONT    Narrative EXAM: MRI BRAIN     INDICATION:   Include scan of orbits bilaterally.   Rule out MS and Optic  Neuritis per Ophthalmology Vision loss     SEQUENCES:   Sagittal T1, axial T1, T2, GRE and FLAIR; axial and coronal T1 post  enhancement; and diffusion imaging of the brain. 19 mL of Dotarem. Thin section  imaging is also performed through the orbits with and without contrast.    FINDINGS:     Orbital soft tissues, optic nerves and chiasm are normal in size, morphology,  signal intensity and enhancement. There are a few punctate white matter foci of T2 hyperintensity in the left  parietal lobe, nonspecific. Remainder of the brain including corpus callosum and  pericallosal white matter show normal signal intensity. The enhancement is  normal.      There is no evidence for mass, hemorrhage, shift, or hydrocephalus. There is no  extra-axial fluid collection. Diffusion imaging shows no diffusion restriction  to indicate acute infarct/ischemia or other process. Impression IMPRESSION: Few nonspecific left parietal white matter T2 hyperintensities. Normal appearance of the orbits. Normal diffusion and enhancement. IR Results (recent):  No results found for this or any previous visit. VAS/US Results (recent):  No results found for this or any previous visit. Reviewed records in [facility]aptol and [employer] tab today    Lab Review     No visits with results within 3 Month(s) from this visit.    Latest known visit with results is:   Admission on 11/01/2017, Discharged on 11/01/2017   Component Date Value Ref Range Status    Special Requests: 11/01/2017 NO SPECIAL REQUESTS    Final    KOH 11/01/2017 NO YEAST SEEN    Final    Clue cells 11/01/2017 CLUE CELLS ABSENT    Final    Wet prep 11/01/2017 NO TRICHOMONAS SEEN    Final    Sample type 11/01/2017 SWAB    Final    Source 11/01/2017 ENDOCERVICAL    Final    Chlamydia amplified 11/01/2017 NEGATIVE   NEG   Final    N. gonorrhea, amplified 11/01/2017 NEGATIVE   NEG   Final    Comment 11/01/2017 Testing performed by the Roche Altagracia CT/NG method, utilizing PCR amplification to identify DNA of the pathogens. This method is not recommended as the sole method of evaluation of cases of sexual abuse nor for other medico-legal indications. Final    Cultures are recommended in these cases. As with all laboratory tests, these results should be interpreted in conjunction with the patient's clinical presentation. Objective:       Exam:  Visit Vitals  /86   Pulse 97   Wt 101.6 kg (224 lb)   SpO2 97%   BMI 36.15 kg/m²     Gen: Awake, alert, follows commands  Appropriate appearance, normal speech. Oriented to all spheres. No visual field defect on confrontation exam.  Full eyes movement, with no nystagmus, no diplopia, no ptosis. Normal gag and swallow. All remaining cranial nerves were normal  Motor function: 5/5 in all extremities  Sensory: intact to LT, PP and JPS  Good FTN and HTS   Gait: Normal    Assessment:       ICD-10-CM ICD-9-CM    1. Intractable migraine with aura with status migrainosus G43. 111 346.03 topiramate (TOPAMAX) 25 mg tablet           Plan:   1. Trial of Topamax 25 mg QHS as headache preventative  2. Take Indomethacin prn  3. If no improvement despite above, will consider doing spinal tap to check for elevated opening pressure  4. Continue headache diary and went through the list that can trigger headache      Follow-up Disposition:  Return in about 4 weeks (around 11/12/2018).           Desiree Berrios MD  Diplomate, American Board of Psychiatry and Neurology  Diplomate, Neuromuscular Medicine  Diplomate, American Board of Electrodiagnostic Medicine

## 2018-10-15 NOTE — PATIENT INSTRUCTIONS
[facility] [place] [facility]rology [facility]   Statement to Patients  April 1, 2014      In an effort to ensure the large volume of patient prescription refills is processed in the most efficient and expeditious manner, we are asking our patients to assist us by calling your Pharmacy for all prescription refills, this will include also your  Mail Order Pharmacy. The pharmacy will contact our office electronically to continue the refill process. Please do not wait until the last minute to call your pharmacy. We need at least 48 hours (2days) to fill prescriptions. We also encourage you to call your pharmacy before going to  your prescription to make sure it is ready. With regard to controlled substance prescription refill requests (narcotic refills) that need to be picked up at our office, we ask your cooperation by providing us with at least 72 hours (3days) notice that you will need a refill. We will not refill narcotic prescription refill requests after 4:00pm on any weekday, Monday through Thursday, or after 2:00pm on Fridays, or on the weekends. We encourage everyone to explore another way of getting your prescription refill request processed using [employer], our patient web portal through our electronic medical record system. [employer] is an efficient and effective way to communicate your medication request directly to the office and  downloadable as an jono on your smart phone . [employer] also features a review functionality that allows you to view your medication list as well as leave messages for your physician. Are you ready to get connected? If so please review the attatched instructions or speak to any of our staff to get you set up right away! Thank you so much for your cooperation. Should you have any questions please contact our Practice Administrator.     The Physicians and Staff,  [facility]        Migraine Headache: Care Instructions  Your Care Instructions  Migraines are painful, throbbing headaches that often start on one side of the head. They may cause nausea and vomiting and make you sensitive to light, sound, or smell. Without treatment, migraines can last from 4 hours to a few days. Medicines can help prevent migraines or stop them after they have started. Your doctor can help you find which ones work best for you. Follow-up care is a key part of your treatment and safety. Be sure to make and go to all appointments, and call your doctor if you are having problems. It's also a good idea to know your test results and keep a list of the medicines you take. How can you care for yourself at home? · Do not drive if you have taken a prescription pain medicine. · Rest in a quiet, dark room until your headache is gone. Close your eyes, and try to relax or go to sleep. Don't watch TV or read. · Put a cold, moist cloth or cold pack on the painful area for 10 to 20 minutes at a time. Put a thin cloth between the cold pack and your skin. · Use a warm, moist towel or a heating pad set on low to relax tight shoulder and neck muscles. · Have someone gently massage your neck and shoulders. · Take your medicines exactly as prescribed. Call your doctor if you think you are having a problem with your medicine. You will get more details on the specific medicines your doctor prescribes. · Be careful not to take pain medicine more often than the instructions allow. You could get worse or more frequent headaches when the medicine wears off. To prevent migraines  · Keep a headache diary so you can figure out what triggers your headaches. Avoiding triggers may help you prevent headaches. Record when each headache began, how long it lasted, and what the pain was like. (Was it throbbing, aching, stabbing, or dull?) Write down any other symptoms you had with the headache, such as nausea, flashing lights or dark spots, or sensitivity to bright light or loud noise. Note if the headache occurred near your period. List anything that might have triggered the headache. Triggers may include certain foods (chocolate, cheese, wine) or odors, smoke, bright light, stress, or lack of sleep. · If your doctor has prescribed medicine for your migraines, take it as directed. You may have medicine that you take only when you get a migraine and medicine that you take all the time to help prevent migraines. ¨ If your doctor has prescribed medicine for when you get a headache, take it at the first sign of a migraine, unless your doctor has given you other instructions. ¨ If your doctor has prescribed medicine to prevent migraines, take it exactly as prescribed. Call your doctor if you think you are having a problem with your medicine. · Find healthy ways to deal with stress. Migraines are most common during or right after stressful times. Take time to relax before and after you do something that has caused a migraine in the past.  · Try to keep your muscles relaxed by keeping good posture. Check your jaw, face, neck, and shoulder muscles for tension. Try to relax them. When you sit at a desk, change positions often. And make sure to stretch for 30 seconds each hour. · Get plenty of sleep and exercise. · Eat meals on a regular schedule. Avoid foods and drinks that often trigger migraines. These include chocolate, alcohol (especially red wine and port), aspartame, monosodium glutamate (MSG), and some additives found in foods (such as hot dogs, seo, cold cuts, aged cheeses, and pickled foods). · Limit caffeine. Don't drink too much coffee, tea, or soda. But don't quit caffeine suddenly. That can also give you migraines. · Do not smoke or allow others to smoke around you. If you need help quitting, talk to your doctor about stop-smoking programs and medicines. These can increase your chances of quitting for good.   · If you are taking birth control pills or hormone therapy, talk to your doctor about whether they are triggering your migraines. When should you call for help? Call 911 anytime you think you may need emergency care. For example, call if:    · You have signs of a stroke. These may include:  ¨ Sudden numbness, paralysis, or weakness in your face, arm, or leg, especially on only one side of your body. ¨ Sudden vision changes. ¨ Sudden trouble speaking. ¨ Sudden confusion or trouble understanding simple statements. ¨ Sudden problems with walking or balance. ¨ A sudden, severe headache that is different from past headaches.    Call your doctor now or seek immediate medical care if:    · You have new or worse nausea and vomiting.     · You have a new or higher fever.     · Your headache gets much worse.    Watch closely for changes in your health, and be sure to contact your doctor if:    · You are not getting better after 2 days (48 hours). Where can you learn more? Go to http://sarmad-farhana.info/. Enter K216 in the search box to learn more about \"Migraine Headache: Care Instructions. \"  Current as of: June 4, 2018  Content Version: 11.8  © 8437-7909 Healthwise, Incorporated. Care instructions adapted under license by [employer] (which disclaims liability or warranty for this information). If you have questions about a medical condition or this instruction, always ask your healthcare professional. [address] any warranty or liability for your use of this information.

## 2018-10-15 NOTE — LETTER
NOTIFICATION OF RETURN TO WORK  
10/15/2018 Ms. Cristhian Macdonald [address] [place] [id number] To Whom It May Concern: 
 
Cristhian Macdonald is under the care of [facility]. She was seen in our office today October 15, 2018. If there are questions or concerns please have the patient contact our office. Sincerely, Sarai Stewart MD

## 2018-10-15 NOTE — MR AVS SNAPSHOT
[address] [place], [place] [address] [place] [address] [facility] [id number] [phone] Patient: Kwame Demarco MRN: [id number] OCS:[id number]/44/1882 Visit Information Date & Time Provider Department Dept. Phone Encounter #  
 10/15/2018 11:00 AM MD Antonio Jackson [facility] Neurology [facility] [phone] [id number] Follow-up Instructions Return in about 4 weeks (around 11/12/2018). Upcoming Health Maintenance Date Due DTaP/Tdap/Td series (1 - Tdap) 11/18/2001 PAP AKA CERVICAL CYTOLOGY 11/18/2001 Influenza Age 5 to Adult 8/1/2018 Allergies as of 10/15/2018  Review Complete On: 10/15/2018 By: Ceferino Muro LPN Severity Noted Reaction Type Reactions Other Food  07/10/2014    Swelling All nuts except peanuts Dilaudid [Hydromorphone (Bulk)] Medium 09/15/2016   Systemic Rash Morphine Medium 07/10/2014   Systemic Rash Tramadol Medium 09/15/2016   Systemic Nausea and Vomiting [facility]  10/08/2018    Swelling Swollen lips Percocet [Oxycodone-acetaminophen] Low 07/10/2014   Systemic Rash Pt tolerated a low dose of 5mg/325mg Current Immunizations  Never Reviewed No immunizations on file. Not reviewed this visit You Were Diagnosed With   
  
 Codes Comments Intractable migraine with aura with status migrainosus    -  Primary ICD-10-CM: D67.459 ICD-9-CM: 346.03 Vitals BP Pulse Weight(growth percentile) SpO2 BMI OB Status 118/86 97 224 lb (101.6 kg) 97% 36.15 kg/m2 Having regular periods Smoking Status Never Smoker BMI and BSA Data Body Mass Index Body Surface Area  
 36.15 kg/m 2 2.18 m 2 Preferred Pharmacy Pharmacy Name Phone [id number] [place], [address] [phone] Your Updated Medication List  
  
   
 This list is accurate as of 10/15/18 11:41 AM.  Always use your most recent med list.  
  
  
  
  
 buPROPion  mg SR tablet Commonly known as:  Rikki Comment Take 100 mg by mouth two (2) times a day. hydrocortisone 2.5 % topical cream  
Commonly known as:  HYTONE Apply  to affected area two (2) times a day. indomethacin 50 mg capsule Commonly known as:  INDOCIN Take 1 Cap by mouth three (3) times daily for 90 days. Prn for headache. Take with meals  
  
 nystatin powder Commonly known as:  MYCOSTATIN Apply  to affected area three (3) times daily. Until rash is resolved. ondansetron 4 mg disintegrating tablet Commonly known as:  ZOFRAN ODT Take 1 Tab by mouth every eight (8) hours as needed for Nausea. prednisoLONE acetate 1 % ophthalmic suspension Commonly known as:  PRED FORTE Apply 1 Drop to eye four (4) days a week. propranolol 10 mg tablet Commonly known as:  INDERAL Take 10 mg by mouth nightly. QVAR REDIHALER 40 mcg/actuation Hfab inhaler Generic drug:  beclomethasone dipropionate Take 40 mcg by inhalation two (2) times a day. topiramate 25 mg tablet Commonly known as:  TOPAMAX Take 1 Tab by mouth nightly. ZOLOFT 50 mg tablet Generic drug:  sertraline Take 50 mg by mouth daily. Prescriptions Sent to Pharmacy Refills  
 topiramate (TOPAMAX) 25 mg tablet 5 Sig: Take 1 Tab by mouth nightly. Class: Normal  
 Pharmacy: [address] [place] [address], [address] Ph #: [phone] Route: Oral  
  
Follow-up Instructions Return in about 4 weeks (around 11/12/2018). To-Do List   
 10/22/2018 8:00 AM  
  Appointment with Johnny Ha CH1 >4H at [place] [address] ([phone])  
  
 10/23/2018 8:00 AM  
  Appointment with Manuel Feldman INFNereyda CH1 >4H at [place] [address] ([phone])  
  
 10/24/2018 8:00 AM  
 Appointment with SS INF1 CH2 >4H at [facility] ([phone]) Patient Instructions PRESCRIPTION REFILL POLICY [facility] Neurology [facility] Statement to Patients April 1, 2014 In an effort to ensure the large volume of patient prescription refills is processed in the most efficient and expeditious manner, we are asking our patients to assist us by calling your Pharmacy for all prescription refills, this will include also your  Mail Order Pharmacy. The pharmacy will contact our office electronically to continue the refill process. Please do not wait until the last minute to call your pharmacy. We need at least 48 hours (2days) to fill prescriptions. We also encourage you to call your pharmacy before going to  your prescription to make sure it is ready. With regard to controlled substance prescription refill requests (narcotic refills) that need to be picked up at our office, we ask your cooperation by providing us with at least 72 hours (3days) notice that you will need a refill. We will not refill narcotic prescription refill requests after 4:00pm on any weekday, Monday through Thursday, or after 2:00pm on Fridays, or on the weekends. We encourage everyone to explore another way of getting your prescription refill request processed using [employer], our patient web portal through our electronic medical record system. [employer] is an efficient and effective way to communicate your medication request directly to the office and  downloadable as an jono on your smart phone . [employer] also features a review functionality that allows you to view your medication list as well as leave messages for your physician. Are you ready to get connected? If so please review the attatched instructions or speak to any of our staff to get you set up right away! Thank you so much for your cooperation. Should you have any questions please contact our Practice Administrator. The Physicians and Staff,  [facility] Migraine Headache: Care Instructions Your Care Instructions Migraines are painful, throbbing headaches that often start on one side of the head. They may cause nausea and vomiting and make you sensitive to light, sound, or smell. Without treatment, migraines can last from 4 hours to a few days. Medicines can help prevent migraines or stop them after they have started. Your doctor can help you find which ones work best for you. Follow-up care is a key part of your treatment and safety. Be sure to make and go to all appointments, and call your doctor if you are having problems. It's also a good idea to know your test results and keep a list of the medicines you take. How can you care for yourself at home? · Do not drive if you have taken a prescription pain medicine. · Rest in a quiet, dark room until your headache is gone. Close your eyes, and try to relax or go to sleep. Don't watch TV or read. · Put a cold, moist cloth or cold pack on the painful area for 10 to 20 minutes at a time. Put a thin cloth between the cold pack and your skin. · Use a warm, moist towel or a heating pad set on low to relax tight shoulder and neck muscles. · Have someone gently massage your neck and shoulders. · Take your medicines exactly as prescribed. Call your doctor if you think you are having a problem with your medicine. You will get more details on the specific medicines your doctor prescribes. · Be careful not to take pain medicine more often than the instructions allow. You could get worse or more frequent headaches when the medicine wears off. To prevent migraines · Keep a headache diary so you can figure out what triggers your headaches. Avoiding triggers may help you prevent headaches. Record when each headache began, how long it lasted, and what the pain was like.  (Was it throbbing, aching, stabbing, or dull?) Write down any other symptoms you had with the headache, such as nausea, flashing lights or dark spots, or sensitivity to bright light or loud noise. Note if the headache occurred near your period. List anything that might have triggered the headache. Triggers may include certain foods (chocolate, cheese, wine) or odors, smoke, bright light, stress, or lack of sleep. · If your doctor has prescribed medicine for your migraines, take it as directed. You may have medicine that you take only when you get a migraine and medicine that you take all the time to help prevent migraines. ¨ If your doctor has prescribed medicine for when you get a headache, take it at the first sign of a migraine, unless your doctor has given you other instructions. ¨ If your doctor has prescribed medicine to prevent migraines, take it exactly as prescribed. Call your doctor if you think you are having a problem with your medicine. · Find healthy ways to deal with stress. Migraines are most common during or right after stressful times. Take time to relax before and after you do something that has caused a migraine in the past. 
· Try to keep your muscles relaxed by keeping good posture. Check your jaw, face, neck, and shoulder muscles for tension. Try to relax them. When you sit at a desk, change positions often. And make sure to stretch for 30 seconds each hour. · Get plenty of sleep and exercise. · Eat meals on a regular schedule. Avoid foods and drinks that often trigger migraines. These include chocolate, alcohol (especially red wine and port), aspartame, monosodium glutamate (MSG), and some additives found in foods (such as hot dogs, seo, cold cuts, aged cheeses, and pickled foods). · Limit caffeine. Don't drink too much coffee, tea, or soda. But don't quit caffeine suddenly. That can also give you migraines. · Do not smoke or allow others to smoke around you. If you need help quitting, talk to your doctor about stop-smoking programs and medicines. These can increase your chances of quitting for good. · If you are taking birth control pills or hormone therapy, talk to your doctor about whether they are triggering your migraines. When should you call for help? Call 911 anytime you think you may need emergency care. For example, call if: 
  · You have signs of a stroke. These may include: 
¨ Sudden numbness, paralysis, or weakness in your face, arm, or leg, especially on only one side of your body. ¨ Sudden vision changes. ¨ Sudden trouble speaking. ¨ Sudden confusion or trouble understanding simple statements. ¨ Sudden problems with walking or balance. ¨ A sudden, severe headache that is different from past headaches.  
 Call your doctor now or seek immediate medical care if: 
  · You have new or worse nausea and vomiting.  
  · You have a new or higher fever.  
  · Your headache gets much worse.  
 Watch closely for changes in your health, and be sure to contact your doctor if: 
  · You are not getting better after 2 days (48 hours). Where can you learn more? Go to http://sarmad-farhana.info/. Enter U045 in the search box to learn more about \"Migraine Headache: Care Instructions. \" Current as of: June 4, 2018 Content Version: 11.8 © 4659-5259 Healthwise, [employer]. Care instructions adapted under license by [employer] (which disclaims liability or warranty for this information). If you have questions about a medical condition or this instruction, always ask your healthcare professional. [place] [address] any warranty or liability for your use of this information. Introducing \[facility] [place] [facility]\"" & HEALTH SERVICES! Dear Jacqulyn Nyhan: Thank you for requesting a [employer] account. Our records indicate that you already have an active [employer] account. You can access your account anytime at [email or web address] Did you know that you can access your hospital and ER discharge instructions at any time in [employer]? You can also review all of your test results from your hospital stay or ER visit. Additional Information If you have questions, please visit the Frequently Asked Questions section of the [employer] website at [email or web address] Remember, [employer] is NOT to be used for urgent needs. For medical emergencies, dial 911. Now available from your iPhone and Android! Please provide this summary of care documentation to your next provider. Your primary care clinician is listed as Kate Helms. If you have any questions after today's visit, please call [phone].

## 2018-10-22 ENCOUNTER — HOSPITAL ENCOUNTER (OUTPATIENT)
Dept: INFUSION THERAPY | Age: 38
Discharge: HOME | End: 2018-10-22
Payer: COMMERCIAL

## 2018-10-22 VITALS
BODY MASS INDEX: 36.51 KG/M2 | DIASTOLIC BLOOD PRESSURE: 75 MMHG | HEART RATE: 62 BPM | TEMPERATURE: 97.4 F | WEIGHT: 226.2 LBS | OXYGEN SATURATION: 98 % | SYSTOLIC BLOOD PRESSURE: 121 MMHG

## 2018-10-22 PROCEDURE — 74011000258 HC RX REV CODE- 258: Performed by: PSYCHIATRY & NEUROLOGY

## 2018-10-22 PROCEDURE — 74011250636 HC RX REV CODE- 250/636: Performed by: PSYCHIATRY & NEUROLOGY

## 2018-10-22 PROCEDURE — 96375 TX/PRO/DX INJ NEW DRUG ADDON: CPT

## 2018-10-22 PROCEDURE — 74011000250 HC RX REV CODE- 250: Performed by: PSYCHIATRY & NEUROLOGY

## 2018-10-22 PROCEDURE — 96365 THER/PROPH/DIAG IV INF INIT: CPT

## 2018-10-22 RX ORDER — DIPHENHYDRAMINE HYDROCHLORIDE 50 MG/ML
25 INJECTION, SOLUTION INTRAMUSCULAR; INTRAVENOUS ONCE
Status: DISCONTINUED | OUTPATIENT
Start: 2018-10-24 | End: 2018-10-24

## 2018-10-22 RX ORDER — METOCLOPRAMIDE HYDROCHLORIDE 5 MG/ML
10 INJECTION INTRAMUSCULAR; INTRAVENOUS ONCE
Status: COMPLETED | OUTPATIENT
Start: 2018-10-23 | End: 2018-10-23

## 2018-10-22 RX ORDER — DIPHENHYDRAMINE HYDROCHLORIDE 50 MG/ML
25 INJECTION, SOLUTION INTRAMUSCULAR; INTRAVENOUS ONCE
Status: COMPLETED | OUTPATIENT
Start: 2018-10-23 | End: 2018-10-23

## 2018-10-22 RX ORDER — DEXAMETHASONE SODIUM PHOSPHATE 100 MG/10ML
10 INJECTION INTRAMUSCULAR; INTRAVENOUS ONCE
Status: DISCONTINUED | OUTPATIENT
Start: 2018-10-24 | End: 2018-10-24

## 2018-10-22 RX ORDER — METOCLOPRAMIDE HYDROCHLORIDE 5 MG/ML
10 INJECTION INTRAMUSCULAR; INTRAVENOUS ONCE
Status: DISCONTINUED | OUTPATIENT
Start: 2018-10-24 | End: 2018-10-24

## 2018-10-22 RX ORDER — DEXAMETHASONE SODIUM PHOSPHATE 100 MG/10ML
10 INJECTION INTRAMUSCULAR; INTRAVENOUS ONCE
Status: COMPLETED | OUTPATIENT
Start: 2018-10-22 | End: 2018-10-22

## 2018-10-22 RX ORDER — DIPHENHYDRAMINE HYDROCHLORIDE 50 MG/ML
25 INJECTION, SOLUTION INTRAMUSCULAR; INTRAVENOUS ONCE
Status: COMPLETED | OUTPATIENT
Start: 2018-10-22 | End: 2018-10-22

## 2018-10-22 RX ORDER — METOCLOPRAMIDE HYDROCHLORIDE 5 MG/ML
10 INJECTION INTRAMUSCULAR; INTRAVENOUS ONCE
Status: COMPLETED | OUTPATIENT
Start: 2018-10-22 | End: 2018-10-22

## 2018-10-22 RX ORDER — DEXAMETHASONE SODIUM PHOSPHATE 100 MG/10ML
10 INJECTION INTRAMUSCULAR; INTRAVENOUS ONCE
Status: COMPLETED | OUTPATIENT
Start: 2018-10-23 | End: 2018-10-23

## 2018-10-22 RX ADMIN — DIHYDROERGOTAMINE MESYLATE 1 MG: 1 INJECTION, SOLUTION INTRAMUSCULAR; INTRAVENOUS; SUBCUTANEOUS at 09:44

## 2018-10-22 RX ADMIN — DEXAMETHASONE SODIUM PHOSPHATE 10 MG: 10 INJECTION INTRAMUSCULAR; INTRAVENOUS at 09:04

## 2018-10-22 RX ADMIN — DIPHENHYDRAMINE HYDROCHLORIDE 25 MG: 50 INJECTION INTRAMUSCULAR; INTRAVENOUS at 09:04

## 2018-10-22 RX ADMIN — SODIUM CHLORIDE 500 MG: 900 INJECTION, SOLUTION INTRAVENOUS at 10:30

## 2018-10-22 RX ADMIN — METOCLOPRAMIDE 10 MG: 5 INJECTION, SOLUTION INTRAMUSCULAR; INTRAVENOUS at 09:04

## 2018-10-23 ENCOUNTER — HOSPITAL ENCOUNTER (OUTPATIENT)
Dept: INFUSION THERAPY | Age: 38
Discharge: HOME | End: 2018-10-23
Payer: COMMERCIAL

## 2018-10-23 VITALS
SYSTOLIC BLOOD PRESSURE: 136 MMHG | TEMPERATURE: 98.9 F | RESPIRATION RATE: 16 BRPM | HEART RATE: 60 BPM | DIASTOLIC BLOOD PRESSURE: 87 MMHG

## 2018-10-23 PROCEDURE — 96365 THER/PROPH/DIAG IV INF INIT: CPT

## 2018-10-23 PROCEDURE — 74011000258 HC RX REV CODE- 258: Performed by: PSYCHIATRY & NEUROLOGY

## 2018-10-23 PROCEDURE — 74011250636 HC RX REV CODE- 250/636: Performed by: PSYCHIATRY & NEUROLOGY

## 2018-10-23 PROCEDURE — 74011000250 HC RX REV CODE- 250: Performed by: PSYCHIATRY & NEUROLOGY

## 2018-10-23 PROCEDURE — 96375 TX/PRO/DX INJ NEW DRUG ADDON: CPT

## 2018-10-23 RX ADMIN — METOCLOPRAMIDE 10 MG: 5 INJECTION, SOLUTION INTRAMUSCULAR; INTRAVENOUS at 08:23

## 2018-10-23 RX ADMIN — DIPHENHYDRAMINE HYDROCHLORIDE 25 MG: 50 INJECTION INTRAMUSCULAR; INTRAVENOUS at 08:30

## 2018-10-23 RX ADMIN — SODIUM CHLORIDE 500 MG: 900 INJECTION, SOLUTION INTRAVENOUS at 09:28

## 2018-10-23 RX ADMIN — DEXAMETHASONE SODIUM PHOSPHATE 10 MG: 10 INJECTION INTRAMUSCULAR; INTRAVENOUS at 08:26

## 2018-10-23 RX ADMIN — DIHYDROERGOTAMINE MESYLATE 1 MG: 1 INJECTION, SOLUTION INTRAMUSCULAR; INTRAVENOUS; SUBCUTANEOUS at 09:00

## 2018-10-23 NOTE — PROGRESS NOTES
[facility] VISIT NOTE 
 
0805. Pt arrived at [facility] ambulatory and in no distress for Chema Day 2/3. Assessment completed, pt c/o pressure in back of head but states she feels better today. 24g PIV present on admission. Dressing clean, dry and intact. Positive blood return noted and flushes easily. Medications received: 
Geoffery [place] Reglan IV Decadron IV Benadryl IV 
DHE IV 
Depacon IV Patient Vitals for the past 12 hrs: 
 Temp Pulse Resp BP  
10/23/18 1044 98.9 °F (37.2 °C) 60 16 136/87  
10/23/18 0806 98.6 °F (37 °C) 71 16 107/63 Tolerated treatment well, no adverse reaction noted. PIV patent and flushed with positive blood return. PIV wrapped with coban and capped for tomorrow's treatment.  
 
1050. D/C'd from [facility] ambulatory and in no distress accompanied by son.  Next appointment is 10/24/18 at 8:00 am.

## 2018-10-24 ENCOUNTER — HOSPITAL ENCOUNTER (OUTPATIENT)
Dept: INFUSION THERAPY | Age: 38
Discharge: HOME | End: 2018-10-24
Payer: COMMERCIAL

## 2018-10-24 ENCOUNTER — TELEPHONE (OUTPATIENT)
Dept: NEUROLOGY | Age: 38
End: 2018-10-24

## 2018-10-24 VITALS
HEART RATE: 53 BPM | RESPIRATION RATE: 16 BRPM | SYSTOLIC BLOOD PRESSURE: 112 MMHG | TEMPERATURE: 98 F | DIASTOLIC BLOOD PRESSURE: 68 MMHG

## 2018-10-24 PROCEDURE — 74011250636 HC RX REV CODE- 250/636: Performed by: PSYCHIATRY & NEUROLOGY

## 2018-10-24 PROCEDURE — 96375 TX/PRO/DX INJ NEW DRUG ADDON: CPT

## 2018-10-24 PROCEDURE — 74011000258 HC RX REV CODE- 258: Performed by: PSYCHIATRY & NEUROLOGY

## 2018-10-24 PROCEDURE — 96365 THER/PROPH/DIAG IV INF INIT: CPT

## 2018-10-24 PROCEDURE — 74011000250 HC RX REV CODE- 250: Performed by: PSYCHIATRY & NEUROLOGY

## 2018-10-24 RX ORDER — SODIUM CHLORIDE 0.9 % (FLUSH) 0.9 %
10 SYRINGE (ML) INJECTION AS NEEDED
Status: ACTIVE | OUTPATIENT
Start: 2018-10-24 | End: 2018-10-25

## 2018-10-24 RX ORDER — METOCLOPRAMIDE HYDROCHLORIDE 5 MG/ML
10 INJECTION INTRAMUSCULAR; INTRAVENOUS ONCE
Status: COMPLETED | OUTPATIENT
Start: 2018-10-24 | End: 2018-10-24

## 2018-10-24 RX ORDER — DIPHENHYDRAMINE HYDROCHLORIDE 50 MG/ML
25 INJECTION, SOLUTION INTRAMUSCULAR; INTRAVENOUS ONCE
Status: COMPLETED | OUTPATIENT
Start: 2018-10-24 | End: 2018-10-24

## 2018-10-24 RX ORDER — SODIUM CHLORIDE 9 MG/ML
25 INJECTION, SOLUTION INTRAVENOUS AS NEEDED
Status: DISPENSED | OUTPATIENT
Start: 2018-10-24 | End: 2018-10-25

## 2018-10-24 RX ORDER — DEXAMETHASONE SODIUM PHOSPHATE 100 MG/10ML
10 INJECTION INTRAMUSCULAR; INTRAVENOUS ONCE
Status: COMPLETED | OUTPATIENT
Start: 2018-10-24 | End: 2018-10-24

## 2018-10-24 RX ADMIN — DIPHENHYDRAMINE HYDROCHLORIDE 25 MG: 50 INJECTION INTRAMUSCULAR; INTRAVENOUS at 09:10

## 2018-10-24 RX ADMIN — SODIUM CHLORIDE 25 ML/HR: 900 INJECTION, SOLUTION INTRAVENOUS at 08:52

## 2018-10-24 RX ADMIN — DEXAMETHASONE SODIUM PHOSPHATE 10 MG: 10 INJECTION INTRAMUSCULAR; INTRAVENOUS at 09:07

## 2018-10-24 RX ADMIN — SODIUM CHLORIDE 500 MG: 900 INJECTION, SOLUTION INTRAVENOUS at 10:10

## 2018-10-24 RX ADMIN — METOCLOPRAMIDE 10 MG: 5 INJECTION, SOLUTION INTRAMUSCULAR; INTRAVENOUS at 08:53

## 2018-10-24 RX ADMIN — DIHYDROERGOTAMINE MESYLATE 1 MG: 1 INJECTION, SOLUTION INTRAMUSCULAR; INTRAVENOUS; SUBCUTANEOUS at 09:51

## 2018-10-24 NOTE — TELEPHONE ENCOUNTER
----- Message from Humphrey Mondragon sent at 10/24/2018 12:00 PM EDT -----  Regarding: Dr. Nelsy Colmenares  The patient is requesting a call back from the doctor or nurse to discuss getting a note to return to work.  (m)[phone]

## 2018-10-24 NOTE — PROGRESS NOTES
Outpatient Infusion Center Progress Note 
 
0800 Pt admit to [facility] for D 3 of 3 [place] ambulatory in stable condition. Assessment completed. No new concerns voiced. PIV intact from previous days. Pt denies pregnancy Medications: 
Reglan ivp Decadron ivp Benadryl ivp Wait 30 minutes DHE IVP Depacon iv 
 
 
 
1130 Pt tolerated treatment well. D/c home ambulatory in no distress. Pt awareno further appointments scheduled at this time.

## 2018-10-31 ENCOUNTER — HOSPITAL ENCOUNTER (OUTPATIENT)
Dept: VASCULAR SURGERY | Age: 38
Discharge: HOME | End: 2018-10-31
Attending: FAMILY MEDICINE
Payer: COMMERCIAL

## 2018-10-31 ENCOUNTER — OFFICE VISIT (OUTPATIENT)
Dept: FAMILY MEDICINE CLINIC | Age: 38
End: 2018-10-31

## 2018-10-31 VITALS
SYSTOLIC BLOOD PRESSURE: 102 MMHG | WEIGHT: 225 LBS | HEART RATE: 64 BPM | HEIGHT: 66 IN | TEMPERATURE: 98.2 F | DIASTOLIC BLOOD PRESSURE: 68 MMHG | RESPIRATION RATE: 22 BRPM | BODY MASS INDEX: 36.16 KG/M2 | OXYGEN SATURATION: 100 %

## 2018-10-31 DIAGNOSIS — R35.0 FREQUENCY OF URINATION: Primary | ICD-10-CM

## 2018-10-31 DIAGNOSIS — M79.604 PAIN OF RIGHT LOWER EXTREMITY: ICD-10-CM

## 2018-10-31 DIAGNOSIS — G93.9 CHRONIC NON-SPECIFIC WHITE MATTER LESIONS ON MRI: ICD-10-CM

## 2018-10-31 DIAGNOSIS — H53.8 BLURRED VISION: ICD-10-CM

## 2018-10-31 LAB
BILIRUB UR QL STRIP: NEGATIVE
GLUCOSE UR-MCNC: NEGATIVE MG/DL
HCG URINE, QL. (POC): NEGATIVE
KETONES P FAST UR STRIP-MCNC: NORMAL MG/DL
PH UR STRIP: 5.5 [PH] (ref 4.6–8)
PROT UR QL STRIP: NEGATIVE
SP GR UR STRIP: 1.03 (ref 1–1.03)
UA UROBILINOGEN AMB POC: NORMAL (ref 0.2–1)
URINALYSIS CLARITY POC: CLEAR
URINALYSIS COLOR POC: YELLOW
URINE BLOOD POC: NEGATIVE
URINE LEUKOCYTES POC: NORMAL
URINE NITRITES POC: NEGATIVE
VALID INTERNAL CONTROL?: YES

## 2018-10-31 PROCEDURE — 93971 EXTREMITY STUDY: CPT

## 2018-10-31 RX ORDER — ALBUTEROL SULFATE 0.83 MG/ML
SOLUTION RESPIRATORY (INHALATION)
COMMUNITY

## 2018-10-31 NOTE — PROCEDURES
[address]  *** FINAL REPORT ***    Name: Mayuri Balderrama  MRN: [id number]    Outpatient  : 1980  HIS Order #: [id number]  [address] [facility] Visit #: [id number]  Date: 31 Oct 2018    TYPE OF TEST: Peripheral Venous Testing    REASON FOR TEST  Pain in limb    Right Leg:-  Deep venous thrombosis:           No  Superficial venous thrombosis:    No  Deep venous insufficiency:        No  Superficial venous insufficiency: No      INTERPRETATION/FINDINGS  PROCEDURE:  RIGHT LOWER EXTREMITY  VENOUS DUPLEX. Evaluation of lower  extremity veins with ultrasound (B-mode imaging, pulsed Doppler, color   Doppler). Includes the common femoral, deep femoral, femoral,  popliteal, posterior tibial, peroneal, and great saphenous veins. Other veins, for example the gastrocnemius and soleal veins, may also  be visualized. FINDINGS: Yulia Miquel scale and color flow duplex images of the veins in the  right lower extremity demonstrate normal compressibility, spontaneous  and augmented flow profiles, and absence of filling defects throughout   the deep and superficial veins in the right lower extremity. CONCLUSION:Right lower extremity venous duplex negative for deep  venous thrombosis or thrombophlebitis. Left common femoral vein is  thrombus free. ADDITIONAL COMMENTS    I have personally reviewed the data relevant to the interpretation of  this  study. TECHNOLOGIST: Gita Moreland  Signed: 10/31/2018 04:41 PM    PHYSICIAN: Gee Daniels.  Jodie Graham MD  Signed: 2018 12:31 PM

## 2018-10-31 NOTE — PROGRESS NOTES
40year old female    New patient      3 cesaream, one     Blurred vision, excessive urination    Was seen by Neurology -- put on topamax and received transfusions for migraine    Problem with urination after infusion    Strong family hx of diabetes    Eye doctor advised that she get autoimmune workup, diabetes, lupus    Will check labs    Needs care coordination    I reviewed with the resident the medical history and the resident's findings on the physical examination. I discussed with the resident the patient's diagnosis and concur with the plan.

## 2018-10-31 NOTE — PATIENT INSTRUCTIONS
Frequent Urination: Care Instructions  Your Care Instructions  An urge to urinate frequently but usually passing only small amounts of urine is a common symptom of urinary problems, such as urinary tract infections. The bladder may become inflamed. This can cause the urge to urinate. You may try to urinate more often than usual to try to soothe that urge. Frequent urination also may be caused by sexually transmitted infections (STIs) or kidney stones. Or it may happen when something irritates the tube that carries urine from the bladder to the outside of the body (urethra). It may also be a sign of diabetes. The cause may be hard to find. You may need tests. Follow-up care is a key part of your treatment and safety. Be sure to make and go to all appointments, and call your doctor if you are having problems. It's also a good idea to know your test results and keep a list of the medicines you take. How can you care for yourself at home? · Drink extra water for the next day or two. This will help make the urine less concentrated. (If you have kidney, heart, or liver disease and have to limit fluids, talk with your doctor before you increase the amount of fluids you drink.)  · Avoid drinks that are carbonated or have caffeine. They can irritate the bladder. For women:  · Urinate right after you have sex. · After you go to the bathroom, wipe from front to back. · Avoid douches, bubble baths, and feminine hygiene sprays. And avoid other feminine hygiene products that have deodorants. When should you call for help? Call your doctor now or seek immediate medical care if:    · You have new symptoms, such as fever, nausea, or vomiting.     · You have new or worse symptoms of a urinary problem. For example:  ? You have blood or pus in your urine. ? You have chills or body aches. ? It hurts to urinate. ? You have groin or belly pain. ? You have pain in your back just below your rib cage (the flank area).  Watch closely for changes in your health, and be sure to contact your doctor if you feel thirstier than usual.  Where can you learn more? Go to http://sarmad-farhana.info/. Enter 939 [id number] in the search box to learn more about \"Frequent Urination: Care Instructions. \"  Current as of: March 21, 2018  Content Version: 11.8  © 5677-0648 [employer]. Care instructions adapted under license by [employer] (which disclaims liability or warranty for this information). If you have questions about a medical condition or this instruction, always ask your healthcare professional. [address] any warranty or liability for your use of this information.      If you have not received a phone call within 24 hours regarding scheduling your diagnostic imaging please call central scheduling at [phone].

## 2018-10-31 NOTE — PROGRESS NOTES
HPI     Chief Complaint   Patient presents with   [address]     She is a 40 y.o. female who presents for establishing care. PMH - migraines    Blurred vision  - started in Sep  - was having headaches, went to Optho (Dr. Cloretta Peabody) who was concerned about MS or optic neuritis and sent her to ED for MRI  - MRI showed nonspecific L parietal white matter T2 hyper intensities but normal appearance of the orbits  - followed up with Neuro (Dr. Elva Steve) who started her on Topamax and also started infusions of Reglan, DHE45, Decadron and Depacon  - states optho wants her to have a lupus and diabetes workup by her PCP  - has a strong family hx of diabetes, no fam hx of autoimmune disorders    Excessive Urination  - states after receiving her infusions for 4 days she started to have bladder problems  - unable to hold her urine during the day or night  - having accidents at night, wearing heavy pads/ depends  - states she has mild pain with urination    RLE Pain  - states she has pain in her R calf  - started 2 days ago  - extremely sensitive to touch  - hurts when she walks  - states she has had swelling on both legs    GYN Hx -  (3 LTCS, 1 )    Review of Systems   Constitutional: Negative for diaphoresis and fever. Eyes: Positive for visual disturbance. Cardiovascular: Positive for leg swelling. Genitourinary: Positive for dysuria, frequency and urgency. Reviewed PmHx, RxHx, FmHx, SocHx, AllgHx and updated and dated in the chart. Physical Exam:  Visit Vitals  /68 (BP 1 Location: Right arm, BP Patient Position: Sitting)   Pulse 64   Temp 98.2 °F (36.8 °C) (Oral)   Resp 22   Ht 5' 6\" (1.676 m)   Wt 225 lb (102.1 kg)   SpO2 100%   BMI 36.32 kg/m²     Physical Exam   Constitutional: She appears well-developed and well-nourished. No distress. Cardiovascular: Normal rate, regular rhythm and normal heart sounds. Exam reveals no gallop and no friction rub.    No murmur heard.  Pulmonary/Chest: Effort normal and breath sounds normal. No stridor. No respiratory distress. She has no wheezes. She has no rales. Abdominal:   obese   Musculoskeletal: She exhibits edema and tenderness.   + Homans sign, TTP of RLE throughout even with light touch, no ropy cord, no change in color of skin   Skin: Skin is warm and dry. She is not diaphoretic. Psychiatric: She has a normal mood and affect. Her behavior is normal.   Nursing note and vitals reviewed.     Recent Results (from the past 12 hour(s))   AMB POC URINE PREGNANCY TEST, VISUAL COLOR COMPARISON    Collection Time: 10/31/18 11:35 AM   Result Value Ref Range    VALID INTERNAL CONTROL POC Yes     HCG urine, Ql. (POC) Negative Negative   AMB POC URINALYSIS DIP STICK AUTO W/O MICRO    Collection Time: 10/31/18 11:35 AM   Result Value Ref Range    Color (UA POC) Yellow     Clarity (UA POC) Clear     Glucose (UA POC) Negative Negative    Bilirubin (UA POC) Negative Negative    Ketones (UA POC) Trace Negative    Specific gravity (UA POC) 1.030 1.001 - 1.035    Blood (UA POC) Negative Negative    pH (UA POC) 5.5 4.6 - 8.0    Protein (UA POC) Negative Negative    Urobilinogen (UA POC) 0.2 mg/dL 0.2 - 1    Nitrites (UA POC) Negative Negative    Leukocyte esterase (UA POC) Trace Negative   UPT neg, UA shows trace LE and trace ketones       Assessment / Plan     Increased Frequency  - POC UPT neg  - UA shows trace LE, will order urine cx  - HgA1c  - Unfortunately lab is now closed, will return after her doppler for labs    Blurred vision  - CANDIDA to screen for lupus, if positive will order more thorough workup  - Will reach out to Neuro and Optho to determine if patient was diagnosed with optic neuritis as she describes (however MRI doesn't note)  - Will clarify what deficits patient is having with vision with Optho    RLE Pain  - R LE Doppler, coordinated today to have test due to acuity of sx    Follow up based on results    I have discussed the diagnosis with the patient and the intended plan as seen in the above orders. The patient has received an after-visit summary and questions were answered concerning future plans. I have discussed medication side effects and warnings with the patient as well.     Patient discussed with Dr. Hilton Pederson MD (Attending)    Regan Delatorre DO  Family Medicine Resident

## 2018-10-31 NOTE — PROGRESS NOTES
Chief Complaint   Patient presents with   [facility] Care     1. Have you been to the ER, urgent care clinic since your last visit? Hospitalized since your last visit? No    2. Have you seen or consulted any other health care providers outside of the [address] [place] [address] since your last visit? Include any pap smears or colon screening.  No

## 2018-11-01 ENCOUNTER — TELEPHONE (OUTPATIENT)
Dept: NEUROLOGY | Age: 38
End: 2018-11-01

## 2018-11-01 LAB
ALBUMIN SERPL-MCNC: 3.9 G/DL (ref 3.5–5.5)
ALBUMIN/GLOB SERPL: 1.3 {RATIO} (ref 1.2–2.2)
ALP SERPL-CCNC: 73 IU/L (ref 39–117)
ALT SERPL-CCNC: 17 IU/L (ref 0–32)
ANA TITR SER IF: NEGATIVE {TITER}
AST SERPL-CCNC: 14 IU/L (ref 0–40)
BACTERIA UR CULT: NORMAL
BILIRUB SERPL-MCNC: <0.2 MG/DL (ref 0–1.2)
BUN SERPL-MCNC: 10 MG/DL (ref 6–20)
BUN/CREAT SERPL: 12 (ref 9–23)
CALCIUM SERPL-MCNC: 8.9 MG/DL (ref 8.7–10.2)
CHLORIDE SERPL-SCNC: 107 MMOL/L (ref 96–106)
CO2 SERPL-SCNC: 20 MMOL/L (ref 20–29)
CREAT SERPL-MCNC: 0.84 MG/DL (ref 0.57–1)
EST. AVERAGE GLUCOSE BLD GHB EST-MCNC: 123 MG/DL
GLOBULIN SER CALC-MCNC: 3 G/DL (ref 1.5–4.5)
GLUCOSE SERPL-MCNC: 98 MG/DL (ref 65–99)
HBA1C MFR BLD: 5.9 % (ref 4.8–5.6)
POTASSIUM SERPL-SCNC: 3.9 MMOL/L (ref 3.5–5.2)
PROT SERPL-MCNC: 6.9 G/DL (ref 6–8.5)
SODIUM SERPL-SCNC: 143 MMOL/L (ref 134–144)

## 2018-11-05 ENCOUNTER — TELEPHONE (OUTPATIENT)
Dept: FAMILY MEDICINE CLINIC | Age: 38
End: 2018-11-05

## 2018-11-05 NOTE — TELEPHONE ENCOUNTER
----- Message from Eulogio Delaney sent at 11/5/2018 12:02 PM EST -----  Regarding: Dr. Rosa Mcfadden telephone   Pt is requesting a note for visit on 10/31/18.  Best contact [phone]

## 2018-11-05 NOTE — TELEPHONE ENCOUNTER
Returned the call to the patient. Needs a letter for work stating no restrictions and is able to return to work. Said she was here last week and did not get the note. She has not returned to work yet due to needing the note. Said she will have the [facility] paperwork dropped off here by her mother for physician to complete. Can you help her find a urologist regarding bladder leakage?

## 2018-11-05 NOTE — TELEPHONE ENCOUNTER
Dr. Ferrari Or Telephone   Received: Today   Message Contents   [facility] [address]   Sender has requested a call back             Sivan Méndez, with [employer] is requesting a call back from the office in reference to the pt needing to be released back to work.  Fax number: [phone] Best contact number: [phone]

## 2018-11-06 NOTE — TELEPHONE ENCOUNTER
returned call to patient regarding letter for work she is requesting also [facility] paperwork. Patient informed that she no longer needed this information because she has resigned form her job. She stated she had to decide between her health and her job and she choose her health. Let the patient know if anything changed and she needed anything form us to call and let us know.

## 2018-11-16 ENCOUNTER — OFFICE VISIT (OUTPATIENT)
Dept: NEUROLOGY | Age: 38
End: 2018-11-16

## 2018-11-16 VITALS
DIASTOLIC BLOOD PRESSURE: 74 MMHG | HEIGHT: 66 IN | WEIGHT: 223 LBS | HEART RATE: 72 BPM | SYSTOLIC BLOOD PRESSURE: 120 MMHG | OXYGEN SATURATION: 98 % | BODY MASS INDEX: 35.84 KG/M2

## 2018-11-16 DIAGNOSIS — G43.111 INTRACTABLE MIGRAINE WITH AURA WITH STATUS MIGRAINOSUS: ICD-10-CM

## 2018-11-16 RX ORDER — TOPIRAMATE 50 MG/1
50 TABLET, FILM COATED ORAL
Qty: 30 TAB | Refills: 5 | Status: SHIPPED | OUTPATIENT
Start: 2018-11-16 | End: 2018-12-13 | Stop reason: DRUGHIGH

## 2018-11-16 NOTE — PROGRESS NOTES
Neurology Progress Note    Patient ID:  Suresh Torres  [id number]  45 y.o.  1980      Subjective:   History:  Margaret Suarez a 40 y. o. female who  has a past medical history of Arthritis; Depression; Headache; and Seizures ([facility]). who in Sept 2018, noted blurred vision of the R eye, described as there is a film in the eyes and severe headache R frontal and R temple area, R periorbital, shooting to back of head, occurring every day, throbbing, constant, 8/10, no aggravating/relivieng factor with nausea and photophobia. Considerations include migraine, with visual auras, intractable, hemicrania continua and pseudotumor cerebri. Patient saw Dr Praful Penny (ophtalmologist) who said everything was fine. Patient was placed on Propranolol and Wellbutrin with no clear benefit. Patient was seen at ER with MRI brain showing non-specific white matter changes. Patient is now on Topamax 25 mg QHS with decrease headache now occurring when she is only going to the bathroom, brief. No side effects. Saw Dr Praful Penny recently who said she has \"optic nerve damage\". Patient has intermittent blurred vision.       ROS:  Per HPI-  Otherwise the remainder of ROS was negative    Social Hx  Social History     Socioeconomic History    Marital status: SINGLE     Spouse name: Not on file    Number of children: Not on file    Years of education: Not on file    Highest education level: Not on file   Social Needs    Financial resource strain: Not on file    Food insecurity - worry: Not on file    Food insecurity - inability: Not on file   [employer] needs - medical: Not on file   [employer] needs - non-medical: Not on file   Occupational History    Not on file   Tobacco Use    Smoking status: Never Smoker    Smokeless tobacco: Never Used   Substance and Sexual Activity    Alcohol use: No    Drug use: No    Sexual activity: Not Currently   Other Topics Concern    Not on file   Social History Narrative    Not on file       Meds:  Current Outpatient Medications on File Prior to Visit   Medication Sig Dispense Refill    albuterol (PROVENTIL VENTOLIN) 2.5 mg /3 mL (0.083 %) nebulizer solution by Nebulization route once.  indomethacin (INDOCIN) 50 mg capsule Take 1 Cap by mouth three (3) times daily for 90 days. Prn for headache. Take with meals 90 Cap 2    buPROPion SR (WELLBUTRIN SR) 100 mg SR tablet Take 100 mg by mouth two (2) times a day. 1    propranolol (INDERAL) 10 mg tablet Take 10 mg by mouth nightly. 4    QVAR REDIHALER 40 mcg/actuation HFAb inhaler Take 40 mcg by inhalation two (2) times a day. 1    prednisoLONE acetate (PRED FORTE) 1 % ophthalmic suspension Apply 1 Drop to eye four (4) days a week. 1    hydrocortisone (HYTONE) 2.5 % topical cream Apply  to affected area two (2) times a day. 30 g 0    ondansetron (ZOFRAN ODT) 4 mg disintegrating tablet Take 1 Tab by mouth every eight (8) hours as needed for Nausea. 9 Tab 0    nystatin (MYCOSTATIN) powder Apply  to affected area three (3) times daily. Until rash is resolved. 30 g 0    sertraline (ZOLOFT) 50 mg tablet Take 50 mg by mouth daily. No current facility-administered medications on file prior to visit. Imaging:    CT Results (recent):  Results from Hospital Encounter encounter on 10/16/16   CT HEAD WO CONT    Narrative INDICATION:   right sided weakness        EXAM:  HEAD CT WITHOUT CONTRAST    COMPARISON:  None    TECHNIQUE:  Routine noncontrast axial head CT was performed. Sagittal and  coronal reconstructions were generated. CT dose reduction was achieved through use of a standardized protocol tailored  for this examination and automatic exposure control for dose modulation. FINDINGS:    The ventricles are midline without hydrocephalus. There is no acute intra or  extra-axial hemorrhage. There is no significant white matter disease. The  basal cisterns are patent. The paranasal sinuses are clear. Impression IMPRESSION:    No acute process. MRI Results (recent):  Results from [place] encounter on 09/14/18   MRI BRAIN W WO CONT    Narrative EXAM: MRI BRAIN     INDICATION:   Include scan of orbits bilaterally. Rule out MS and Optic  Neuritis per Ophthalmology   Vision loss     SEQUENCES:   Sagittal T1, axial T1, T2, GRE and FLAIR; axial and coronal T1 post  enhancement; and diffusion imaging of the brain. 19 mL of Dotarem. Thin section  imaging is also performed through the orbits with and without contrast.    FINDINGS:     Orbital soft tissues, optic nerves and chiasm are normal in size, morphology,  signal intensity and enhancement. There are a few punctate white matter foci of T2 hyperintensity in the left  parietal lobe, nonspecific. Remainder of the brain including corpus callosum and  pericallosal white matter show normal signal intensity. The enhancement is  normal.      There is no evidence for mass, hemorrhage, shift, or hydrocephalus. There is no  extra-axial fluid collection. Diffusion imaging shows no diffusion restriction  to indicate acute infarct/ischemia or other process. Impression IMPRESSION: Few nonspecific left parietal white matter T2 hyperintensities. Normal appearance of the orbits. Normal diffusion and enhancement. IR Results (recent):  No results found for this or any previous visit.     VAS/US Results (recent):  Results from Hospital Encounter encounter on 10/31/18   DUPLEX LOWER EXT VENOUS RIGHT       Reviewed records in [facility] and media tab today    Lab Review     Office Visit on 10/31/2018   Component Date Value Ref Range Status    VALID INTERNAL CONTROL POC 10/31/2018 Yes   Final    HCG urine, Ql. (POC) 10/31/2018 Negative  Negative Final    Color (UA POC) 10/31/2018 Yellow   Final    Clarity (UA POC) 10/31/2018 Clear   Final    Glucose (UA POC) 10/31/2018 Negative  Negative Final    Bilirubin (UA POC) 10/31/2018 Negative Negative Final    Ketones (UA POC) 10/31/2018 Trace  Negative Final    Specific gravity (UA POC) 10/31/2018 1.030  1.001 - 1.035 Final    Blood (UA POC) 10/31/2018 Negative  Negative Final    pH (UA POC) 10/31/2018 5.5  4.6 - 8.0 Final    Protein (UA POC) 10/31/2018 Negative  Negative Final    Urobilinogen (UA POC) 10/31/2018 0.2 mg/dL  0.2 - 1 Final    Nitrites (UA POC) 10/31/2018 Negative  Negative Final    Leukocyte esterase (UA POC) 10/31/2018 Trace  Negative Final    Hemoglobin A1c 10/31/2018 5.9* 4.8 - 5.6 % Final    Comment:          Prediabetes: 5.7 - 6.4           Diabetes: >6.4           Glycemic control for adults with diabetes: <7.0      Estimated average glucose 10/31/2018 123  mg/dL Final    Glucose 10/31/2018 98  65 - 99 mg/dL Final    BUN 10/31/2018 10  6 - 20 mg/dL Final    Creatinine 10/31/2018 0.84  0.57 - 1.00 mg/dL Final    GFR est non-AA 10/31/2018 89  >59 mL/min/1.73 Final    GFR est AA 10/31/2018 103  >59 mL/min/1.73 Final    BUN/Creatinine ratio 10/31/2018 12  9 - 23 Final    Sodium 10/31/2018 143  134 - 144 mmol/L Final    Potassium 10/31/2018 3.9  3.5 - 5.2 mmol/L Final    Chloride 10/31/2018 107* 96 - 106 mmol/L Final    CO2 10/31/2018 20  20 - 29 mmol/L Final    Calcium 10/31/2018 8.9  8.7 - 10.2 mg/dL Final    Protein, total 10/31/2018 6.9  6.0 - 8.5 g/dL Final    Albumin 10/31/2018 3.9  3.5 - 5.5 g/dL Final    GLOBULIN, TOTAL 10/31/2018 3.0  1.5 - 4.5 g/dL Final    A-G Ratio 10/31/2018 1.3  1.2 - 2.2 Final    Bilirubin, total 10/31/2018 <0.2  0.0 - 1.2 mg/dL Final    Alk.  phosphatase 10/31/2018 73  39 - 117 IU/L Final    AST (SGOT) 10/31/2018 14  0 - 40 IU/L Final    ALT (SGPT) 10/31/2018 17  0 - 32 IU/L Final    Antinuclear Abs, IFA 10/31/2018 Negative   Final    Comment:                                      Negative   <1:80                                       Borderline  1:80                                       Positive   >1:80      Urine Culture, Routine 10/31/2018    Final                    Value:Mixed urogenital nazario  Less than 10,000 colonies/mL           Objective:       Exam:  Visit Vitals  /74   Pulse 72   Ht 5' 6\" (1.676 m)   Wt 101.2 kg (223 lb)   SpO2 98%   BMI 35.99 kg/m²     Gen: Awake, alert, follows commands  Appropriate appearance, normal speech. Oriented to all spheres. No visual field defect on confrontation exam.  Full eyes movement, with no nystagmus, no diplopia, no ptosis. Normal gag and swallow. All remaining cranial nerves were normal  Motor function: 5/5 in all extremities  Sensory: intact to LT, PP and JPS  Good FTN and HTS   Gait: Normal    Assessment:       ICD-10-CM ICD-9-CM    1. Intractable migraine with aura with status migrainosus G43. 111 346.03 topiramate (TOPAMAX) 50 mg tablet           Plan:   1. Increase Topamax 50 mg QHS as headache preventative  2. Discussed doing a spinal tap to look for elevated CSF opening pressure, but patient declined for now  3. If no improvement despite above, will consider doing spinal tap  4. Continue headache diary and list that can trigger headache  5. Patient to get notes from ophthalmologist Dr Julio Cornelius for my review    Follow-up Disposition:  Return in about 3 months (around 2/16/2019).           Mikie Artem, MD  Diplomate, American Board of Psychiatry and Neurology  Diplomate, Neuromuscular Medicine  Diplomate, American Board of Electrodiagnostic Medicine

## 2018-11-16 NOTE — PROGRESS NOTES
Patient is here for follow up. She had the IV infusion treatments Oct 22-24th and since then she has a hard time knowing when she has to go to the bathroom. She states she called and sent mychart about the issue. Still wakes up at night(4:30am) and cannot go back to sleep. Normally goes to bed at 9pm.     3 migraines this month lasting 1-2 hours. Patient is accompanied by her mother and her son.

## 2018-11-16 NOTE — LETTER
11/16/2018 4:25 PM 
 
Patient:  Berta Vera YOB: 1980 Date of Visit: 11/16/2018 Dear Guillermo Chester, [address] [place] [address] VIA In Basket Tamara Merritt MD 
[address] [facility] [address] [facility] [address] VIA Facsimile: [phone] 
 : Thank you for referring Ms. Berta Vera to me for evaluation/treatment. Below are the relevant portions of my assessment and plan of care. If you have questions, please do not hesitate to call me. I look forward to following Ms. Mabel Nayak along with you. Sincerely, Odessa Rock MD

## 2018-11-16 NOTE — PATIENT INSTRUCTIONS
[facility]   Statement to Patients  April 1, 2014      In an effort to ensure the large volume of patient prescription refills is processed in the most efficient and expeditious manner, we are asking our patients to assist us by calling your Pharmacy for all prescription refills, this will include also your  Mail Order Pharmacy. The pharmacy will contact our office electronically to continue the refill process. Please do not wait until the last minute to call your pharmacy. We need at least 48 hours (2days) to fill prescriptions. We also encourage you to call your pharmacy before going to  your prescription to make sure it is ready. With regard to controlled substance prescription refill requests (narcotic refills) that need to be picked up at our office, we ask your cooperation by providing us with at least 72 hours (3days) notice that you will need a refill. We will not refill narcotic prescription refill requests after 4:00pm on any weekday, Monday through Thursday, or after 2:00pm on Fridays, or on the weekends. We encourage everyone to explore another way of getting your prescription refill request processed using [employer], our patient web portal through our electronic medical record system. [employer] is an efficient and effective way to communicate your medication request directly to the office and  downloadable as an jono on your smart phone . [employer] also features a review functionality that allows you to view your medication list as well as leave messages for your physician. Are you ready to get connected? If so please review the attatched instructions or speak to any of our staff to get you set up right away! Thank you so much for your cooperation. Should you have any questions please contact our Practice Administrator.     The Physicians and Staff,  [facility]

## 2018-11-16 NOTE — TELEPHONE ENCOUNTER
11/16/2018 4:01 PM    Attempted to call mobile number again to discuss labs. Mobile number VM is full. Home phone not in serve. Called work and she is not there. Will attempt to call mobile again later.     Rehan Barnett, DO  Family Med Resident, PGY2

## 2018-11-23 ENCOUNTER — DOCUMENTATION ONLY (OUTPATIENT)
Dept: NEUROLOGY | Age: 38
End: 2018-11-23

## 2018-11-23 ENCOUNTER — TELEPHONE (OUTPATIENT)
Dept: FAMILY MEDICINE CLINIC | Age: 38
End: 2018-11-23

## 2018-11-23 NOTE — TELEPHONE ENCOUNTER
11/23/2018 3:48 PM    Called patient to discuss A1c results put her in pre diabetic range. Likely not the cause of her vision loss though. States Neuro is recommending LP. Optho is working up for possible glaucoma. All questions/ concerns addressed.      Rosario Cheng, DO

## 2018-11-23 NOTE — PROGRESS NOTES
Reviewed medical records from her ophthalmologist Dr. Bonnie Santamaria.     Diagnosis is high risk for bilateral open angle glaucoma  Chronic anterior uveitis of the right eye,  Dry eye syndrome both eyes  Ischemic optic neuritis of the right eye  No mention about the findings to suggest pseudotumor cerebri    Alie Tineo MD

## 2018-12-12 ENCOUNTER — TELEPHONE (OUTPATIENT)
Dept: FAMILY MEDICINE CLINIC | Age: 38
End: 2018-12-12

## 2018-12-12 PROBLEM — H20.11 CHRONIC ANTERIOR UVEITIS OF RIGHT EYE: Status: ACTIVE | Noted: 2018-12-12

## 2018-12-12 NOTE — TELEPHONE ENCOUNTER
12/12/2018 5:47 PM    Called patient to discuss records sent by Humza Hamilton (Optho) requesting specific labs to be drawn for dx of anterior uveitis. Recommend patient come in to have labs drawn. Left VM to call back.     Carrie Hammond DO  [facility] Resident, PGY2

## 2018-12-13 ENCOUNTER — OFFICE VISIT (OUTPATIENT)
Dept: FAMILY MEDICINE CLINIC | Age: 38
End: 2018-12-13

## 2018-12-13 VITALS
TEMPERATURE: 98.3 F | SYSTOLIC BLOOD PRESSURE: 122 MMHG | RESPIRATION RATE: 17 BRPM | HEART RATE: 60 BPM | DIASTOLIC BLOOD PRESSURE: 76 MMHG | WEIGHT: 222 LBS | HEIGHT: 66 IN | BODY MASS INDEX: 35.68 KG/M2 | OXYGEN SATURATION: 99 %

## 2018-12-13 DIAGNOSIS — H20.11 CHRONIC ANTERIOR UVEITIS OF RIGHT EYE: Primary | ICD-10-CM

## 2018-12-13 DIAGNOSIS — G89.29 CHRONIC BILATERAL LOW BACK PAIN WITH SCIATICA, SCIATICA LATERALITY UNSPECIFIED: Primary | ICD-10-CM

## 2018-12-13 DIAGNOSIS — M54.40 CHRONIC BILATERAL LOW BACK PAIN WITH SCIATICA, SCIATICA LATERALITY UNSPECIFIED: Primary | ICD-10-CM

## 2018-12-13 DIAGNOSIS — B37.2 CANDIDAL DERMATITIS: ICD-10-CM

## 2018-12-13 RX ORDER — NYSTATIN 100000 [USP'U]/G
POWDER TOPICAL 4 TIMES DAILY
Qty: 1 BOTTLE | Refills: 1 | Status: SHIPPED | OUTPATIENT
Start: 2018-12-13 | End: 2020-03-10

## 2018-12-13 RX ORDER — TRIAMCINOLONE ACETONIDE 0.15 MG/G
AEROSOL, SPRAY TOPICAL
COMMUNITY
Start: 2018-10-11 | End: 2020-03-19

## 2018-12-13 RX ORDER — CYCLOBENZAPRINE HCL 5 MG
5 TABLET ORAL
Qty: 30 TAB | Refills: 0 | Status: SHIPPED | OUTPATIENT
Start: 2018-12-13 | End: 2019-02-07 | Stop reason: ALTCHOICE

## 2018-12-13 RX ORDER — CLOBETASOL PROPIONATE 0.5 MG/G
OINTMENT TOPICAL
COMMUNITY
Start: 2018-10-26 | End: 2020-03-19

## 2018-12-13 RX ORDER — NYSTATIN 100000 U/G
OINTMENT TOPICAL 2 TIMES DAILY
Qty: 15 G | Refills: 0 | Status: SHIPPED | OUTPATIENT
Start: 2018-12-13 | End: 2020-03-19

## 2018-12-13 RX ORDER — LIDOCAINE AND TETRACAINE 70; 70 MG/G; MG/G
CREAM TOPICAL
COMMUNITY
Start: 2018-10-11 | End: 2020-03-19

## 2018-12-13 NOTE — PATIENT INSTRUCTIONS
[facility]  [address], [address] [facility]  [address]   [facility], [address]  Phone: [phone]  Fax: [phone] [address] [facility] [place] [address]  [address]  Phone: [phone]  Fax: [phone]     Learning About Relief for Back Pain  What is back tension and strain? Back strain happens when you overstretch, or pull, a muscle in your back. You may hurt your back in an accident or when you exercise or lift something. Most back pain will get better with rest and time. You can take care of yourself at home to help your back heal.  What can you do first to relieve back pain? When you first feel back pain, try these steps:  · Walk. Take a short walk (10 to 20 minutes) on a level surface (no slopes, hills, or stairs) every 2 to 3 hours. Walk only distances you can manage without pain, especially leg pain. · Relax. Find a comfortable position for rest. Some people are comfortable on the floor or a medium-firm bed with a small pillow under their head and another under their knees. Some people prefer to lie on their side with a pillow between their knees. Don't stay in one position for too long. · Try heat or ice. Try using a heating pad on a low or medium setting, or take a warm shower, for 15 to 20 minutes every 2 to 3 hours. Or you can buy single-use heat wraps that last up to 8 hours. You can also try an ice pack for 10 to 15 minutes every 2 to 3 hours. You can use an ice pack or a bag of frozen vegetables wrapped in a thin towel. There is not strong evidence that either heat or ice will help, but you can try them to see if they help. You may also want to try switching between heat and cold. · Take pain medicine exactly as directed. ? If the doctor gave you a prescription medicine for pain, take it as prescribed.   ? If you are not taking a prescription pain medicine, ask your doctor if you can take an over-the-counter medicine. What else can you do? · Stretch and exercise. Exercises that increase flexibility may relieve your pain and make it easier for your muscles to keep your spine in a good, neutral position. And don't forget to keep walking. · Do self-massage. You can use self-massage to unwind after work or school or to energize yourself in the morning. You can easily massage your feet, hands, or neck. Self-massage works best if you are in comfortable clothes and are sitting or lying in a comfortable position. Use oil or lotion to massage bare skin. · Reduce stress. Back pain can lead to a vicious [place]: Distress about the pain tenses the muscles in your back, which in turn causes more pain. Learn how to relax your mind and your muscles to lower your stress. Where can you learn more? Go to http://sarmadHyperformixfarhana.info/. Enter E618 in the search box to learn more about \"Learning About Relief for Back Pain. \"  Current as of: November 29, 2017  Content Version: 11.8  © 0139-6410 [employer]. Care instructions adapted under license by [facility] (which disclaims liability or warranty for this information). If you have questions about a medical condition or this instruction, always ask your healthcare professional. [address] any warranty or liability for your use of this information. Therapeutic Ball: Back Exercises  Your Care Instructions  Here are some examples of typical exercises for your condition. Start each exercise slowly. Ease off the exercise if you start to have pain. Your doctor or physical therapist will tell you when you can start these exercises and which ones will work best for you. To prepare, make sure that your ball is the right size for you. When inflated and firm, it should allow you to sit with your hips and knees bent at about a 90-degree angle (like the letter L).   How to do the exercises  Seated position on ball    1. Use this exercise to get used to moving on the ball and to find your best sitting position. 2. Sit comfortably on the ball with your feet about hip-width apart. If you feel unsteady, rest your hands on the ball near your hips. 3. As you do this exercise, try to keep your shoulders and upper body relaxed and still. 4. Using your stomach and back muscles to move your pelvis, roll the ball forward. This will round your back. 5. Still using your stomach and back muscles, roll the ball back. You will arch your back. 6. Repeat this rounding-arching motion a few times. 7. Stop in between the two positions, where your back is not rounded or arched. This is called your neutral position. Pelvic rotation    1. Sit tall on the ball. 2. Slowly rotate your hips in a [place] pattern. Keep the movement focused at your hips. 3. Repeat, but [place] in the other direction. 4. Repeat 8 to 12 times. Postural sitting    1. Use this position to find a stable, relaxed posture on the ball. You can use this position as your starting point for other ball exercises. If you feel unsteady on the ball, start on a chair first.  2. Sit on a ball or chair, with your feet planted straight in front of you. 3. Imagine that a string at the top of your head is pulling you straight up. Think of yourself as 2 inches taller than you are.  4. Slightly tuck your chin. 5. Keep your shoulders back and relaxed. Knee extension    1. Sit tall on the ball with your feet planted in front of you, hip-width apart. As you do this exercise, avoid slumping your shoulders and arching your back. 2. Rest your hands on the ball near your hip or a steady object next to you. (If you feel very stable on the ball, rest your hands in your lap or at your side.)  3. Slowly straighten one leg at the knee. Slowly lower it back down. Repeat with the other leg. 4. Repeat this exercise 8 to 12 times. Roll-ups    1.  Lie on your back with your knees bent, feet resting on the floor. 2. Lay the ball on your thighs. Rest your hands up high on the ball. 3. Raising your head and shoulder blades, roll the ball up your thighs. Exhale as you roll up. 4. If this is hard on your neck, gently support your lower head and upper neck with one hand. Don't use that hand to pull your head up. 5. Repeat 8 to 12 times. Ball curls    1. Lie on your back with your ankles resting on the ball, knees straight. 2. Use your legs to roll the exercise ball toward you. Allow your knees to bend and move closer to your chest.  3. Pause briefly, and then roll the ball to the starting position. Try to keep the ball rolling straight. You will feel the muscles in your lower belly working. 4. Repeat 8 to 12 times. Bridge with ball under legs    1. Lie on your back with your legs up, calves resting on the ball. For more challenge, rest your heels on the ball. 2. Look up at the ceiling, and keep your chin relaxed. You can place a small pillow under your head or neck for comfort. 3. With your arms by your side, press your hands onto the floor for stability. 4. Tighten your belly muscles by pulling in your belly button toward your spine. 5. Push your heels down toward the floor, squeeze your buttocks, and lift your hips off the floor until your shoulders, hips, and knees are all in a straight line. 6. Try to keep the ball steady. Hold for about 6 seconds as you continue to breathe normally. 7. Slowly lower your hips back down to the floor. 8. Repeat 8 to 12 times. Ball curls with bridge    1. Start flat on your back with your ankles resting on the ball. 2. Look up at the ceiling, and keep your chin relaxed. You can place a small pillow under your head or neck for comfort. 3. With your arms by your side, press your hands onto the floor for stability. 4. Tighten your belly muscles by pulling in your belly button toward your spine.   5. Push your heels down toward the floor, squeeze your buttocks, and lift your hips off the floor until your shoulders, hips, and knees are all in a straight line. 6. While holding the bridge position, roll the ball toward you with your heels. Keep your hips as level as you can. 7. Pause briefly, and then roll the ball back out. Try to keep the ball rolling straight. You will feel the muscles in your lower belly working as you straighten your legs. 8. Lower your hips, and return to your starting position. 9. Repeat 8 to 12 times. 10. When you can keep your body and the ball steady throughout this exercise, you're ready for more challenge. Try keeping your hips raised while rolling the ball out, holding the bridge, and rolling back, a few times in a row. Praying karrie    1. Kneel upright with the ball in front of you. 2. To start, clasp your hands together. Rest them on the ball in front of you. 3. As you do this exercise, keep your back and hips straight and tighten your belly and buttocks muscles. Keep your knees in place. 4. Press on the ball with your arms. Lean forward from the knees. This rolls the ball forward. You will bear most of your weight on your arms. 5. If your back starts to ache, you've gone too far. Pull back a bit. 6. Roll back to the start position. 7. Repeat 8 to 12 times. Walk-out plank on ball    1. Kneel over the ball. Place your hands on the floor in front of you. 2. Walk your hands forward until your legs are straight on the ball. This is the plank position. 3. When in plank position, hold your body straight and tighten your belly and buttocks muscles. Keep your chin slightly tucked. 4. Roll as far forward as you can without losing your balance or letting your hips drop. You may stop with the ball under your thighs, or even under your knees or shins. 5. Hold a few seconds, then walk your hands back and return to the start position. 6. Repeat 8 to 12 times.     Push-up with thighs on ball    1. Kneel over the ball. Place your hands on the floor in front of you. 2. Walk your hands forward until your legs are straight on the ball. This is the plank position. 3. When in plank position, hold your body straight and tighten your belly and buttocks muscles. Keep your chin slightly tucked. 4. Roll as far forward as you can without losing your balance or letting your hips drop. You may stop with the ball under your thighs, or even under your knees or shins. 5. Bend your elbows. Slowly lower your body toward the ground as far as you can without losing your balance. 6. If your wrists hurt, try moving your hands a little farther apart so they're not right under your shoulders. 7. Slowly straighten your arms. 8. Do 8 to 12 of these push-ups. Wall squat with ball    1. Stand facing away from a wall. Place your feet about shoulder-width apart. 2. Place the ball between your middle back and the wall. Move your feet out in front of you so they are about a foot in front of your hips. 3. Keep your arms at your sides, or put your hands on your hips. 4. Slowly squat down as if you are going to sit in a chair, rolling your back over the ball as you squat. The ball should move with you but stay pressed into the wall. 5. Be sure that your knees do not go in front of your toes as you squat. 6. Hold for 6 seconds. 7. Slowly rise to your standing position. 8. Repeat 8 to 12 times. Child's pose with ball    1. Kneeling upright with your back straight, rest your hands on the ball in front of you. 2. Breathe out as you bend at the hips, and roll the ball forward. Lower your chest toward the ground, and drop your hips back toward your heels. 3. To stretch your upper back and shoulders, hold this position for 15 to 30 seconds. 4. Repeat 2 to 4 times. Follow-up care is a key part of your treatment and safety. Be sure to make and go to all appointments, and call your doctor if you are having problems. It's also a good idea to know your test results and keep a list of the medicines you take. Where can you learn more? Go to http://sarmad-farhana.info/. Enter A983 in the search box to learn more about \"Therapeutic Ball: Back Exercises. \"  Current as of: November 29, 2017  Content Version: 11.8  © 5436-4863 [employer]. Care instructions adapted under license by [employer] (which disclaims liability or warranty for this information). If you have questions about a medical condition or this instruction, always ask your healthcare professional. [address] any warranty or liability for your use of this information. Yeast Skin Infection: Care Instructions  Your Care Instructions    Yeast normally lives on your skin. Sometimes too much yeast can overgrow in certain areas of the skin and cause an infection. The infection causes red, scaly, moist patches on your skin that may itch. Common areas for skin yeast infections are skin folds under the breasts or belly area. The warm and moist areas in the skin folds can make it easier for yeast to overgrow. Yeast infections also can be found on other parts of the body such as the groin or armpits. You will probably get a cream or ointment that contains an antifungal medicine. Examples of these are miconazole and clotrimazole. You put it on your skin to treat the infection. Your doctor may give you a prescription for the cream or ointment. Or you may be able to buy it without a prescription at most drugstores. If the infection is severe, the doctor will prescribe antifungal pills. A yeast infection usually goes away after about a week of treatment. But it's important to use the medicine for as long as your doctor tells you to. Follow-up care is a key part of your treatment and safety. Be sure to make and go to all appointments, and call your doctor if you are having problems.  It's also a good idea to know your test results and keep a list of the medicines you take. How can you care for yourself at home? · Be safe with medicines. Take your medicines exactly as prescribed. Call your doctor if you think you are having a problem with your medicine. · Keep your skin clean and dry. Your doctor may suggest using powder that contains an antifungal medicine in the skin folds. · Wear loose clothing. When should you call for help? Call your doctor now or seek immediate medical care if:    · You have symptoms of infection, such as:  ? Increased pain, swelling, warmth, or redness. ? Red streaks leading from the area. ? Pus draining from the area. ? A fever.    Watch closely for changes in your health, and be sure to contact your doctor if:    · You do not get better as expected. Where can you learn more? Go to http://sarmad-farhana.info/. Enter R090 in the search box to learn more about \"Yeast Skin Infection: Care Instructions. \"  Current as of: April 18, 2018  Content Version: 11.8  © 1002-0199 [employer] Care instructions adapted under license by [facility] (which disclaims liability or warranty for this information). If you have questions about a medical condition or this instruction, always ask your healthcare professional. [address] any warranty or liability for your use of this information.

## 2018-12-13 NOTE — LETTER
NOTIFICATION RETURN TO WORK  
 
12/13/2018 11:39 AM 
 
Ms. Berta Vera [address] [place] [id number] To Whom It May Concern: 
 
Berta Vera is currently under the care of [address]. She will return to work on: Friday, December 14, 2018 If there are questions or concerns please have the patient contact our office. Sincerely, Pao Tena MD

## 2018-12-13 NOTE — PROGRESS NOTES
Chief Complaint   Patient presents with    Scoliosis     dx with scoliosis since she was 15     Started hurting terribly on the 3rd of this month. Hard for her to sleep    Pain radiating down to legs. Considerable discomfort at night.

## 2018-12-13 NOTE — PROGRESS NOTES
Subjective:   Oliver Lawrence is an 45 y.o. female who presents for evaluation of back pain. HPI  Chief Complaint   Patient presents with    Scoliosis     dx with scoliosis since she was 15     1. Back pain  She reports acute on chronic back pain which has been worsening for the last 2 weeks. The pain is 5-6/10, located in the lumbar area bilaterally, w/o radiation, aggravated by active movements and twisting side to side. No fecal or urinary incontinence. No numbness or tingling in the feet. Denies any trauma, fall or excessive exercise prior to worsening of the pain. She was taking OTC Aleve with minimal improvement. She has known history of scoliosis which was diagnosed at the age of 15. She was followed by the specialist at [facility] but stopped seeing them couple of years ago. Never tried PT. 2. Rash   She reports having rash on the lower abdomen, groin area and on the inner aspects of the thighs bilaterally. She was seen in the ER \"couple of months ago\" for the same problem , was prescribed Triamcinolone cream. She was using the cream with minimal improvement of the rash. No oozing. Allergies - reviewed: Allergies   Allergen Reactions    Other Food Swelling     All nuts except peanuts    Dilaudid [Hydromorphone (Bulk)] Rash    Morphine Rash    Tramadol Nausea and Vomiting    [place] Swelling     Swollen lips    Percocet [Oxycodone-Acetaminophen] Rash     Pt tolerated a low dose of 5mg/325mg         Medications - reviewed:  Current Outpatient Medications   Medication Sig    triamcinolone acetonide (KENALOG) 0.147 mg/gram aero topical spray     clobetasol (TEMOVATE) 0.05 % ointment     lidocaine-tetracaine 7-7 % crea     cyclobenzaprine (FLEXERIL) 5 mg tablet Take 1 Tab by mouth nightly.  nystatin (MYCOSTATIN) 100,000 unit/gram ointment Apply  to affected area two (2) times a day.  nystatin (MYCOSTATIN) powder Apply  to affected area four (4) times daily.     albuterol (PROVENTIL VENTOLIN) 2.5 mg /3 mL (0.083 %) nebulizer solution by Nebulization route once.  indomethacin (INDOCIN) 50 mg capsule Take 1 Cap by mouth three (3) times daily for 90 days. Prn for headache. Take with meals    buPROPion SR (WELLBUTRIN SR) 100 mg SR tablet Take 100 mg by mouth two (2) times a day.  propranolol (INDERAL) 10 mg tablet Take 10 mg by mouth nightly.  QVAR REDIHALER 40 mcg/actuation HFAb inhaler Take 40 mcg by inhalation two (2) times a day.  nystatin (MYCOSTATIN) powder Apply  to affected area three (3) times daily. Until rash is resolved.  hydrocortisone (HYTONE) 2.5 % topical cream Apply  to affected area two (2) times a day.  topiramate ER (TROKENDI XR) 50 mg capsule Take 1 Cap by mouth nightly.  prednisoLONE acetate (PRED FORTE) 1 % ophthalmic suspension Apply 1 Drop to eye four (4) days a week.  sertraline (ZOLOFT) 50 mg tablet Take 50 mg by mouth daily.  ondansetron (ZOFRAN ODT) 4 mg disintegrating tablet Take 1 Tab by mouth every eight (8) hours as needed for Nausea. No current facility-administered medications for this visit. Past Medical History - reviewed:  Past Medical History:   Diagnosis Date    Arthritis     Depression     Headache     Ill-defined condition     scoliosis, gall stones, anemia    Seizures ([facility] [address]          Family History - reviewed:  History reviewed. No pertinent family history.       Social History - reviewed:  Social History     Socioeconomic History    Marital status: SINGLE     Spouse name: Not on file    Number of children: Not on file    Years of education: Not on file    Highest education level: Not on file   Social Needs    Financial resource strain: Not on file    Food insecurity - worry: Not on file    Food insecurity - inability: Not on file   [employer] needs - medical: Not on file   [employer] needs - non-medical: Not on file   Occupational History    Not on file   Tobacco Use    Smoking status: Never Smoker    Smokeless tobacco: Never Used   Substance and Sexual Activity    Alcohol use: No    Drug use: No    Sexual activity: Not Currently   Other Topics Concern    Not on file   Social History Narrative    Not on file         Review of Systems   CONSTITUTIONAL: denies fever. Denies chills. CARDIOVASCULAR: denies chest pain. Denies palpitations  RESPIRATORY: denies shortness of breath  GI: denies abdominal pain. Denies change in stools. Denies fecal incontinence  :denies urinary incontinence  NEURO: denies numbness or tingling   MUSCULOSKELETAL: +Back pain  SKIN: +rash. Denies easy bruising          Objective:     Visit Vitals  /76   Pulse 60   Temp 98.3 °F (36.8 °C) (Oral)   Resp 17   Ht 5' 6\" (1.676 m)   Wt 222 lb (100.7 kg)   LMP 12/05/2018 (Exact Date)   SpO2 99%   BMI 35.83 kg/m²       General appearance - alert, well appearing, and in no distress  Chest - clear to auscultation, no wheezes, rales or rhonchi, symmetric air entry  Heart - normal rate, regular rhythm, normal S1, S2, no murmurs, rubs, clicks or gallops  Skin- slightly erythematous moist monogenous patches with sharp margins located in fold of the abdomen and in the inner aspect of the thighs bilaterally, no oozing or bleeding.    Musculoskeletal:   Posture: Learning to the left sidel   Deformity: +Visually seen severe scoliosis     ROM:     Flexion: Significantly decreased 2/2 pain    Extension: Significantly decreased 2/2 pain    Lateral bending:  Significantly decreased 2/2 pain     Gait: The patient leaning forward and slightly to the left while walking around     Palpation:    L1-L5: No tenderness    Sacrum: No tenderness    Coccyx: No tenderness    Left Paraspinal: +Significnat tenderness on light palpation    Right Paraspinal: +Significnat tenderness on light palpation     Strength (0-5/5)    Hip Flexion:   Left: 5/5  Right: 5/5    Hip Extension:  Left: 5/5  Right: 5/5    Hip Abduction:  Left: 5/5  Right: 5/5    Hip Adduction:  Left: 5/5  Right: 5/5       Sensation: Intact, no deficits      DTR:    Patella:  Left: +2  Right: +2    Achilles:  Left: +2  Right: +2     Special test:    Straight leg: Left: Negative  Right: Negative    Mulugetas: Left: Negative  Right: Negative    Piriformis: Left: Negative  Right: Negative           XR low back w/o acute changes, showed severe scoliosis. Assessment:   46 yo female who is here for:     ICD-10-CM ICD-9-CM    1. Chronic bilateral low back pain with sciatica, sciatica laterality unspecified M54.40 724.2 XR SPINE LUMB 2 OR 3 V    G89.29 724.3 cyclobenzaprine (FLEXERIL) 5 mg tablet     338.29 REFERRAL TO PHYSICAL THERAPY   2. Candidal dermatitis B37.2 112.3 nystatin (MYCOSTATIN) 100,000 unit/gram ointment      nystatin (MYCOSTATIN) powder       Plan:   1. Back pain. Acute on chronic. XR low spine w/o acute changes, showed severe scoliosis. The pain is likely related to underlying severe scoliosis. Discussed the patient with Dr. North Hester ( sport medicine specialist) who recommended to start PT.   · If no improvement with PT will need referral to spine surgeon for evaluation for possible surgery. · Will give Rx     Follow-up Disposition: Not on File      I have discussed the diagnosis with the patient and the intended plan as seen in the above orders. The patient has received an after-visit summary and questions were answered concerning future plans. I have discussed medication side effects and warnings with the patient as well. Informed pt to return to the office if new symptoms arise.     Mechelle Aceves MD  Family Medicine Resident, PGY-3

## 2018-12-13 NOTE — PROGRESS NOTES
45year old female with back pain    Hx scoliosis    [place] [facility]     Denies any current neuropathy    Referred to Physical Therapy    I reviewed with the resident the medical history and the resident's findings on the physical examination. I discussed with the resident the patient's diagnosis and concur with the plan.

## 2018-12-13 NOTE — TELEPHONE ENCOUNTER
12/13/2018 9:42 AM    Called patient back. Received report requesting specific labs from [facility]. Will pass along to Dr. Camelia Seay who patient is meeting with today.      Anjel Maciel,   Family Med Resident, PGY2

## 2018-12-13 NOTE — TELEPHONE ENCOUNTER
Per message from [place]:    [facility] Front Office         Pt is returning call regarding paperwork to be discussed.      Best contact # [phone]

## 2018-12-16 ENCOUNTER — HOSPITAL ENCOUNTER (EMERGENCY)
Age: 38
Discharge: HOME | End: 2018-12-16
Attending: EMERGENCY MEDICINE
Payer: MEDICAID

## 2018-12-16 VITALS
DIASTOLIC BLOOD PRESSURE: 86 MMHG | HEIGHT: 66 IN | RESPIRATION RATE: 18 BRPM | TEMPERATURE: 98.8 F | BODY MASS INDEX: 35.68 KG/M2 | OXYGEN SATURATION: 99 % | WEIGHT: 222 LBS | HEART RATE: 63 BPM | SYSTOLIC BLOOD PRESSURE: 134 MMHG

## 2018-12-16 DIAGNOSIS — K08.89 PAIN, DENTAL: Primary | ICD-10-CM

## 2018-12-16 PROCEDURE — 99282 EMERGENCY DEPT VISIT SF MDM: CPT

## 2018-12-16 RX ORDER — OXYCODONE AND ACETAMINOPHEN 5; 325 MG/1; MG/1
1 TABLET ORAL
Qty: 20 TAB | Refills: 0 | Status: SHIPPED | OUTPATIENT
Start: 2018-12-16 | End: 2019-01-24

## 2018-12-16 RX ORDER — PENICILLIN V POTASSIUM 500 MG/1
500 TABLET, FILM COATED ORAL 4 TIMES DAILY
Qty: 40 TAB | Refills: 0 | Status: SHIPPED | OUTPATIENT
Start: 2018-12-16 | End: 2019-01-07 | Stop reason: ALTCHOICE

## 2018-12-16 NOTE — ED PROVIDER NOTES
Pt. Presents to the ER with complaints of mouth pain. Pain is describes as a 10/10. Pt. Was supposed to have a tooth extraction but couldn't because of insurance issues. Pt's pain has been present for a month but has been worsening. Her tooth has been fractured. Pt. Has to eat on the right sided because her tooth on the left hurts.  -swelling.  -fevers/chills. Pt. Has not seen a dentist due to financial issues. No trouble swallowing or breathing. No other complaints. Past Medical History:   Diagnosis Date    Arthritis     Depression     Headache     Ill-defined condition     scoliosis, gall stones, anemia    Seizures ([facility] [address]        Past Surgical History:   Procedure Laterality Date    HX  SECTION      x 4    HX HERNIA REPAIR      HX TUBAL LIGATION           No family history on file. Social History     Socioeconomic History    Marital status: SINGLE     Spouse name: Not on file    Number of children: Not on file    Years of education: Not on file    Highest education level: Not on file   Social Needs    Financial resource strain: Not on file    Food insecurity - worry: Not on file    Food insecurity - inability: Not on file    Transportation needs - medical: Not on file   [employer] needs - non-medical: Not on file   Occupational History    Not on file   Tobacco Use    Smoking status: Never Smoker    Smokeless tobacco: Never Used   Substance and Sexual Activity    Alcohol use: No    Drug use: No    Sexual activity: Not Currently   Other Topics Concern    Not on file   Social History Narrative    Not on file         ALLERGIES: Other food; Dilaudid [hydromorphone (bulk)]; Morphine; Tramadol; [address]; and Percocet [oxycodone-acetaminophen]    Review of Systems   Constitutional: Negative for chills and fever. HENT: Positive for dental problem. Negative for rhinorrhea and sore throat. Respiratory: Negative for cough and shortness of breath. Cardiovascular: Negative for chest pain. Gastrointestinal: Negative for abdominal pain, diarrhea, nausea and vomiting. Genitourinary: Negative for dysuria and urgency. Musculoskeletal: Negative for arthralgias and back pain. Skin: Negative for rash. Neurological: Negative for dizziness, weakness and light-headedness. There were no vitals filed for this visit. Physical Exam     Const:  No acute distress, well developed, well nourished  Head:  Atraumatic, normocephalic  HEENT:  Dental fractures and pain with fractured tooth in left upper, no surrounding erythema, no swelling  Eyes:  PERRL, conjunctiva normal, no scleral icterus  Neck:  Supple, trachea midline  Cardiovascular:  Regular rate  Resp:  No resp distress, no increased work of breathing  Abd:  non-distended  :  Deferred  MSK:  No pedal edema, normal ROM  Neuro:  Alert and oriented x3, no cranial nerve defect  Skin:  Warm, dry, intact  Psych: normal mood and affect, behavior is normal, judgement and thought content is normal      MDM  Number of Diagnoses or Management Options     Amount and/or Complexity of Data Reviewed  Review and summarize past medical records: yes            Pt. With dental pain. No signs of Juventino's or abscess. Pt. To f/u with a dentist.  I will start her on pen vk and percocet. Pt. To f/u with a dentist or return to the ER with worsening sx.     Procedures

## 2018-12-16 NOTE — LETTER
[address] [place] [address] Dr 
[facility] EMERGENCY DEPT 
[address] [facility] [address] [id number] [phone] Work/School Note Date: 12/16/2018 To Whom It May concern: 
 
Loy Yanez was seen and treated today in the emergency room by the following provider(s): 
Attending Provider: Yareli Zeng MD. Loy Yanez should be excused from work on 12/16/18.  
 
Sincerely, 
 
 
 
 
Adam Petit MD

## 2018-12-16 NOTE — ED TRIAGE NOTES
C/o left sided upper teeth pain/jaw pain. Is supposed to have an extraction but her insurance canceled procedure. Pt reports that her tooth is broken in half.

## 2018-12-18 ENCOUNTER — TELEPHONE (OUTPATIENT)
Dept: FAMILY MEDICINE CLINIC | Age: 38
End: 2018-12-18

## 2018-12-18 NOTE — TELEPHONE ENCOUNTER
[facility] called and asked that PT order be faxed over as patient there for appt now.     Faxed to [phone]     [phone]

## 2018-12-21 LAB
ACE SERPL-CCNC: 47 U/L (ref 14–82)
B BURGDOR IGG PATRN SER IB-IMP: POSITIVE
B BURGDOR IGG+IGM SER-ACNC: 1.02 ISR (ref 0–0.9)
B BURGDOR IGM PATRN SER IB-IMP: NEGATIVE
B BURGDOR IGM SER IA-ACNC: <0.8 INDEX (ref 0–0.79)
B BURGDOR18KD IGG SER QL IB: PRESENT
B BURGDOR23KD IGG SER QL IB: PRESENT
B BURGDOR23KD IGM SER QL IB: PRESENT
B BURGDOR28KD IGG SER QL IB: ABNORMAL
B BURGDOR30KD IGG SER QL IB: ABNORMAL
B BURGDOR39KD IGG SER QL IB: PRESENT
B BURGDOR39KD IGM SER QL IB: ABNORMAL
B BURGDOR41KD IGG SER QL IB: PRESENT
B BURGDOR41KD IGM SER QL IB: ABNORMAL
B BURGDOR45KD IGG SER QL IB: ABNORMAL
B BURGDOR58KD IGG SER QL IB: PRESENT
B BURGDOR66KD IGG SER QL IB: ABNORMAL
B BURGDOR93KD IGG SER QL IB: PRESENT
BASOPHILS # BLD AUTO: 0 X10E3/UL (ref 0–0.2)
BASOPHILS NFR BLD AUTO: 0 %
CRP SERPL HS-MCNC: 2.93 MG/L (ref 0–3)
EOSINOPHIL # BLD AUTO: 0.2 X10E3/UL (ref 0–0.4)
EOSINOPHIL NFR BLD AUTO: 3 %
ERYTHROCYTE [DISTWIDTH] IN BLOOD BY AUTOMATED COUNT: 15.2 % (ref 12.3–15.4)
ERYTHROCYTE [SEDIMENTATION RATE] IN BLOOD BY WESTERGREN METHOD: 25 MM/HR (ref 0–32)
HCT VFR BLD AUTO: 34.8 % (ref 34–46.6)
HGB BLD-MCNC: 11.2 G/DL (ref 11.1–15.9)
HIV 1+2 AB+HIV1 P24 AG SERPL QL IA: NON REACTIVE
IMM GRANULOCYTES # BLD: 0 X10E3/UL (ref 0–0.1)
IMM GRANULOCYTES NFR BLD: 0 %
LYMPHOCYTES # BLD AUTO: 1.6 X10E3/UL (ref 0.7–3.1)
LYMPHOCYTES NFR BLD AUTO: 31 %
MCH RBC QN AUTO: 28.1 PG (ref 26.6–33)
MCHC RBC AUTO-ENTMCNC: 32.2 G/DL (ref 31.5–35.7)
MCV RBC AUTO: 87 FL (ref 79–97)
MONOCYTES # BLD AUTO: 0.3 X10E3/UL (ref 0.1–0.9)
MONOCYTES NFR BLD AUTO: 6 %
NEUTROPHILS # BLD AUTO: 3.1 X10E3/UL (ref 1.4–7)
NEUTROPHILS NFR BLD AUTO: 60 %
NON-TREPONEMAL SCREENING VDRL, 816700: NORMAL
PLATELET # BLD AUTO: 287 X10E3/UL (ref 150–379)
RBC # BLD AUTO: 3.98 X10E6/UL (ref 3.77–5.28)
RHEUMATOID FACT SERPL-ACNC: <10 IU/ML (ref 0–13.9)
T PALLIDUM AB SER QL IF: NON REACTIVE
WBC # BLD AUTO: 5.2 X10E3/UL (ref 3.4–10.8)

## 2018-12-22 NOTE — PROGRESS NOTES
This patient has a hx of chronic anterior uveitis. Was wondering if you would recommend 28 days of Doxy?

## 2018-12-22 NOTE — PROGRESS NOTES
Lyme positive - could be subacute vs chronic. Have discussed with Optho who recommend to start tx and state no difference in outcome between Doxy and CTX for uveitis. Recommend longer course of therapy (20+ days). Have called patient and left VM to determine what pharmacy to send Rx to. Will start on Doxy 100 BID x 28 days. Will fax records to ID on Monday for further reccs.

## 2018-12-23 NOTE — PROGRESS NOTES
Attempted to call patient again to confirm pharmacy. Did not answer. Left VM at [phone] as instructed per family member. Will attempt again at later time.

## 2018-12-23 NOTE — PROGRESS NOTES
Called patient to discuss dx of Lyme. Will start Doxy 100mg BID x 28 days. Called Rx into [facility] [phone]. All questions/ concerns answered.  Will follow up in 2 weeks:

## 2018-12-24 ENCOUNTER — TELEPHONE (OUTPATIENT)
Dept: FAMILY MEDICINE CLINIC | Age: 38
End: 2018-12-24

## 2018-12-24 NOTE — TELEPHONE ENCOUNTER
Returned the call and spoke with patient in which I asked her about the name of the medication. She said she spoke with Dr. Carmen Echavarria yesterday and she was to order   Doxycycline.   The pharmacy has not rec'd it.      (also this medication order is not showing in the medication list)

## 2018-12-24 NOTE — TELEPHONE ENCOUNTER
----- Message from Sendy Muller sent at 12/22/2018  4:01 PM EST -----  Regarding: Dr. Kaitlyn Tony called back and advised that she wanted her medication [facility] [phone]. Pt would like a call back from a missed call. Pt best contact number is [phone]

## 2018-12-28 ENCOUNTER — OFFICE VISIT (OUTPATIENT)
Dept: FAMILY MEDICINE CLINIC | Age: 38
End: 2018-12-28

## 2018-12-28 VITALS
DIASTOLIC BLOOD PRESSURE: 75 MMHG | BODY MASS INDEX: 35.84 KG/M2 | OXYGEN SATURATION: 98 % | HEIGHT: 66 IN | SYSTOLIC BLOOD PRESSURE: 107 MMHG | HEART RATE: 60 BPM | RESPIRATION RATE: 22 BRPM | TEMPERATURE: 98.9 F | WEIGHT: 223 LBS

## 2018-12-28 DIAGNOSIS — A69.20 LYME DISEASE: Primary | ICD-10-CM

## 2018-12-28 DIAGNOSIS — Z23 ENCOUNTER FOR IMMUNIZATION: ICD-10-CM

## 2018-12-28 DIAGNOSIS — H20.9 ANTERIOR UVEITIS: ICD-10-CM

## 2018-12-28 NOTE — PATIENT INSTRUCTIONS
Lyme Disease: Care Instructions  Your Care Instructions    Lyme disease is a bacterial infection spread by ticks. Antibiotics can treat Lyme disease. If you do not treat Lyme disease, it can lead to problems with your skin, joints, heart, and nervous system. These problems can develop weeks, months, or even years after you get the infection. Your doctor may prescribe antibiotics even if it is not yet certain that you have Lyme disease. Follow-up care is a key part of your treatment and safety. Be sure to make and go to all appointments, and call your doctor if you are having problems. It's also a good idea to know your test results and keep a list of the medicines you take. How can you care for yourself at home? · Take your antibiotics as directed. Do not stop taking them just because you feel better. You need to take the full course of antibiotics. · Take an over-the-counter pain medicine if needed, such as acetaminophen (Tylenol), ibuprofen (Advil, Motrin), or naproxen (Aleve). Read and follow all instructions on the label. To prevent Lyme disease in the future  · Avoid ticks:  ? Learn where ticks are found in your community, and stay away from those areas if possible. ? Cover as much of your body as possible when you work or play in grassy or wooded areas. ? Use insect repellents, such as products containing DEET. You can spray them on your skin. ? Take steps to control ticks on your property if you live in an area where Lyme disease occurs. Clear leaves, brush, tall grasses, woodpiles, and stone fences from around your house and the edges of your yard or garden. This may help get rid of ticks. · When you come in from outdoors, check your body for ticks, including your groin, head, and underarms. The ticks may be about the size of a poppy seed. If no one else can help you check for ticks on your scalp, comb your hair with a fine-tooth comb. · If you find a tick, remove it quickly.  If you can't remove it with your fingers, use tweezers to grasp the tick as close to its mouth (the part in your skin) as possible. Slowly pull the tick straight out--do not twist or yank--until its mouth releases from your skin. If part of the tick stays in the skin, leave it alone. It will likely come out on its own in a few days. · Ticks can come into your house on clothing, outdoor gear, and pets. These ticks can fall off and attach to you. ? Check your clothing and outdoor gear. Remove any ticks you find. Then put your clothing in a clothes dryer on high heat for 1 hour to kill any ticks that might remain. ? Check your pets for ticks after they have been outdoors. · When hiking in the woods, carry a small dry jar or ziplock bag. If you find a tick on your body, remove the tick and put it in the jar or bag. Store the container in the freezer so you can give it to your doctor if symptoms develop. The tick can be tested to learn whether it is carrying the bacteria that cause Lyme disease. When should you call for help? Call your doctor now or seek immediate medical care if:    · You are confused or cannot think clearly.     · You have a headache or stiff neck.     · You have a new or worse rash.     · You have symptoms of infection, such as:  ? Increased pain, swelling, warmth, or redness. ? Red streaks leading from the area. ? Pus draining from the area. ? A fever.    Watch closely for changes in your health, and be sure to contact your doctor if:    · You have new or worse weakness or muscle pain.     · You have new joint pain.     · You do not get better as expected. Where can you learn more? Go to http://sarmad-farhana.info/. Enter Y599 in the search box to learn more about \"Lyme Disease: Care Instructions. \"  Current as of: November 18, 2017  Content Version: 11.8  © 3209-2708 [employer].  Care instructions adapted under license by [employer] (which disclaims liability or warranty for this information).  If you have questions about a medical condition or this instruction, always ask your healthcare professional. [place] [address] any warranty or liability for your use of this information.

## 2018-12-28 NOTE — PROGRESS NOTES
HPI     Chief Complaint   Patient presents with    Follow-up     Lymes     She is a 45 y.o. female who presents for follow up. Lyme with chronic anterior uveitis   - Discussed case with ID, sent labs over and will await further instructions  - Spoke with Optho on call who recommended a longer course of Doxy at least 20 days  - Rx sent into pharmacy for patient, instructed to take Doxy 100mg BID x28 days  - States she has been tolerating Doxy well so far  - Made appt with Optho 6/12 Patblake Thao - [phone] ext [phone])  - Patient has informed Neurologist     Review of Systems   Eyes: Positive for visual disturbance. Musculoskeletal: Positive for myalgias. Neurological: Positive for headaches. Reviewed PmHx, RxHx, FmHx, SocHx, AllgHx and updated and dated in the chart. Physical Exam:  Visit Vitals  /75   Pulse 60   Temp 98.9 °F (37.2 °C) (Oral)   Resp 22   Ht 5' 6\" (1.676 m)   Wt 223 lb (101.2 kg)   LMP 12/05/2018 (Exact Date)   SpO2 98%   BMI 35.99 kg/m²     Physical Exam   Constitutional: She appears well-developed and well-nourished. No distress. Cardiovascular: Normal rate, regular rhythm and normal heart sounds. Exam reveals no gallop and no friction rub. No murmur heard. Pulmonary/Chest: Effort normal and breath sounds normal. No stridor. No respiratory distress. She has no wheezes. She has no rales. Skin: Skin is warm and dry. She is not diaphoretic. Psychiatric: She has a normal mood and affect. Her behavior is normal.   Nursing note and vitals reviewed. No results found for this or any previous visit (from the past 12 hour(s)).        Assessment / Plan     Chronic Anterior Uveitis 2/2 Lyme  - Doxy 100mg 1 tab PO BID x28 days  - Will follow up after finishing treatment  - Will discuss with Optho when she should follow up for routine eye exam, scheduled now for June  - Records have been sent to ID    Encounter for Immunization  - Flu shot today    I have discussed the diagnosis with the patient and the intended plan as seen in the above orders. The patient has received an after-visit summary and questions were answered concerning future plans. I have discussed medication side effects and warnings with the patient as well.     Patient discussed with Dr. Brock Trammell (Attending)    Qasim Rolon DO  Family Medicine Resident

## 2019-01-03 RX ORDER — DEXAMETHASONE SODIUM PHOSPHATE 100 MG/10ML
10 INJECTION INTRAMUSCULAR; INTRAVENOUS ONCE
Status: COMPLETED | OUTPATIENT
Start: 2019-01-08 | End: 2019-01-08

## 2019-01-03 RX ORDER — DIPHENHYDRAMINE HYDROCHLORIDE 50 MG/ML
25 INJECTION, SOLUTION INTRAMUSCULAR; INTRAVENOUS ONCE
Status: COMPLETED | OUTPATIENT
Start: 2019-01-08 | End: 2019-01-08

## 2019-01-03 RX ORDER — METOCLOPRAMIDE HYDROCHLORIDE 5 MG/ML
10 INJECTION INTRAMUSCULAR; INTRAVENOUS ONCE
Status: COMPLETED | OUTPATIENT
Start: 2019-01-08 | End: 2019-01-08

## 2019-01-04 RX ORDER — METOCLOPRAMIDE HYDROCHLORIDE 5 MG/ML
10 INJECTION INTRAMUSCULAR; INTRAVENOUS ONCE
Status: COMPLETED | OUTPATIENT
Start: 2019-01-09 | End: 2019-01-09

## 2019-01-04 RX ORDER — DIPHENHYDRAMINE HYDROCHLORIDE 50 MG/ML
25 INJECTION, SOLUTION INTRAMUSCULAR; INTRAVENOUS ONCE
Status: COMPLETED | OUTPATIENT
Start: 2019-01-09 | End: 2019-01-09

## 2019-01-04 RX ORDER — DEXAMETHASONE SODIUM PHOSPHATE 100 MG/10ML
10 INJECTION INTRAMUSCULAR; INTRAVENOUS ONCE
Status: COMPLETED | OUTPATIENT
Start: 2019-01-09 | End: 2019-01-09

## 2019-01-07 ENCOUNTER — OFFICE VISIT (OUTPATIENT)
Dept: FAMILY MEDICINE CLINIC | Age: 39
End: 2019-01-07

## 2019-01-07 ENCOUNTER — TELEPHONE (OUTPATIENT)
Dept: FAMILY MEDICINE CLINIC | Age: 39
End: 2019-01-07

## 2019-01-07 VITALS
WEIGHT: 225 LBS | HEART RATE: 76 BPM | HEIGHT: 66 IN | OXYGEN SATURATION: 100 % | BODY MASS INDEX: 36.16 KG/M2 | DIASTOLIC BLOOD PRESSURE: 76 MMHG | RESPIRATION RATE: 16 BRPM | TEMPERATURE: 98.9 F | SYSTOLIC BLOOD PRESSURE: 112 MMHG

## 2019-01-07 DIAGNOSIS — Z78.9 MEDICATION INTOLERANCE: ICD-10-CM

## 2019-01-07 DIAGNOSIS — A69.20 LYME DISEASE: Primary | ICD-10-CM

## 2019-01-07 RX ORDER — METOCLOPRAMIDE HYDROCHLORIDE 5 MG/ML
10 INJECTION INTRAMUSCULAR; INTRAVENOUS ONCE
Status: COMPLETED | OUTPATIENT
Start: 2019-01-10 | End: 2019-01-10

## 2019-01-07 RX ORDER — DOXYCYCLINE 100 MG/1
CAPSULE ORAL
Refills: 0 | COMMUNITY
Start: 2018-12-23 | End: 2019-01-24

## 2019-01-07 RX ORDER — AMOXICILLIN 500 MG/1
500 CAPSULE ORAL 3 TIMES DAILY
Qty: 21 CAP | Refills: 2 | Status: SHIPPED | OUTPATIENT
Start: 2019-01-07 | End: 2019-01-14 | Stop reason: DRUGHIGH

## 2019-01-07 RX ORDER — DEXAMETHASONE SODIUM PHOSPHATE 100 MG/10ML
10 INJECTION INTRAMUSCULAR; INTRAVENOUS ONCE
Status: COMPLETED | OUTPATIENT
Start: 2019-01-10 | End: 2019-01-10

## 2019-01-07 RX ORDER — DIPHENHYDRAMINE HYDROCHLORIDE 50 MG/ML
25 INJECTION, SOLUTION INTRAMUSCULAR; INTRAVENOUS ONCE
Status: COMPLETED | OUTPATIENT
Start: 2019-01-10 | End: 2019-01-10

## 2019-01-07 NOTE — PROGRESS NOTES
Vikki Alvarez is a 45 y.o. female who presents with a chief complaint of vomiting and myalgias. The patient reports that she was diagnosed with lyme disease a few weeks ago, and was placed on doxycycline for that about 2 weeks. She reports a few days after starting the medication she began feeling ill, with headaches, vomiting right after she takes it, nausea, and body aches all over. She reports these symptoms have remained present for about the last 2 weeks, but she has continued to take the medications. She otherwise denies any fevers or chills. Denies any other new medications during this time. She denies significant sun exposure. Meds:    Current Outpatient Medications:     doxycycline (VIBRAMYCIN) 100 mg capsule, TAKE ONE CAPSULE BY MOUTH TWICE A DAY, Disp: , Rfl: 0    triamcinolone acetonide (KENALOG) 0.147 mg/gram aero topical spray, , Disp: , Rfl:     cyclobenzaprine (FLEXERIL) 5 mg tablet, Take 1 Tab by mouth nightly., Disp: 30 Tab, Rfl: 0    nystatin (MYCOSTATIN) 100,000 unit/gram ointment, Apply  to affected area two (2) times a day., Disp: 15 g, Rfl: 0    nystatin (MYCOSTATIN) powder, Apply  to affected area four (4) times daily. , Disp: 1 Bottle, Rfl: 1    topiramate ER (TROKENDI XR) 50 mg capsule, Take 1 Cap by mouth nightly., Disp: 30 Cap, Rfl: 5    albuterol (PROVENTIL VENTOLIN) 2.5 mg /3 mL (0.083 %) nebulizer solution, by Nebulization route once., Disp: , Rfl:     buPROPion SR (WELLBUTRIN SR) 100 mg SR tablet, Take 100 mg by mouth two (2) times a day., Disp: , Rfl: 1    propranolol (INDERAL) 10 mg tablet, Take 10 mg by mouth nightly., Disp: , Rfl: 4    QVAR REDIHALER 40 mcg/actuation HFAb inhaler, Take 40 mcg by inhalation two (2) times a day., Disp: , Rfl: 1    nystatin (MYCOSTATIN) powder, Apply  to affected area three (3) times daily.  Until rash is resolved., Disp: 30 g, Rfl: 0    hydrocortisone (HYTONE) 2.5 % topical cream, Apply  to affected area two (2) times a day., Disp: 30 g, Rfl: 0    ondansetron (ZOFRAN ODT) 4 mg disintegrating tablet, Take 1 Tab by mouth every eight (8) hours as needed for Nausea., Disp: 9 Tab, Rfl: 0    penicillin v potassium (VEETID) 500 mg tablet, Take 1 Tab by mouth four (4) times daily. , Disp: 40 Tab, Rfl: 0    oxyCODONE-acetaminophen (PERCOCET) 5-325 mg per tablet, Take 1 Tab by mouth every four (4) hours as needed for Pain. Max Daily Amount: 6 Tabs., Disp: 20 Tab, Rfl: 0    clobetasol (TEMOVATE) 0.05 % ointment, , Disp: , Rfl:     lidocaine-tetracaine 7-7 % crea, , Disp: , Rfl:     indomethacin (INDOCIN) 50 mg capsule, Take 1 Cap by mouth three (3) times daily for 90 days. Prn for headache. Take with meals, Disp: 90 Cap, Rfl: 2    prednisoLONE acetate (PRED FORTE) 1 % ophthalmic suspension, Apply 1 Drop to eye four (4) days a week., Disp: , Rfl: 1    sertraline (ZOLOFT) 50 mg tablet, Take 50 mg by mouth daily. , Disp: , Rfl:   No current facility-administered medications for this visit.      Facility-Administered Medications Ordered in Other Visits:     [START ON 1/10/2019] dexamethasone (DECADRON) injection 10 mg, 10 mg, IntraVENous, ONCE, Christiana Zurita MD    [START ON 1/10/2019] dihydroergotamine (DHE-45) 1 mg in 0.9% sodium chloride 50 mL IVPB, 1 mg, IntraVENous, ONCE, Christiana Zurita MD    [START ON 1/10/2019] diphenhydrAMINE (BENADRYL) injection 25 mg, 25 mg, IntraVENous, ONCE, Christiana Zurita MD  [facility]  Lola Castaneda ON 1/10/2019] metoclopramide HCl (REGLAN) injection 10 mg, 10 mg, IntraVENous, ONCE, Christiana Zurita MD  [facility]  [START ON 1/10/2019] valproate (DEPACON) 500 mg in 0.9% sodium chloride 50 mL IVPB, 500 mg, IntraVENous, ONCE, Christiana Zurita MD  [facility]  [START ON 1/9/2019] dihydroergotamine (DHE-45) 1 mg in 0.9% sodium chloride 50 mL IVPB, 1 mg, IntraVENous, ONCE, Christiana Zurita MD    [START ON 1/9/2019] diphenhydrAMINE (BENADRYL) injection 25 mg, 25 mg, IntraVENous, ONCE, Christiana Zurita MD    [START ON 1/9/2019] metoclopramide HCl (REGLAN) injection 10 mg, 10 mg, IntraVENous, ONCE, Star Barrow MD    [START ON 1/9/2019] valproate (DEPACON) 500 mg in 0.9% sodium chloride 50 mL IVPB, 500 mg, IntraVENous, ONCE, Primitivo Ramos MD    [START ON 1/9/2019] dexamethasone (DECADRON) injection 10 mg, 10 mg, IntraVENous, ONCE, Star Barrow MD    [START ON 1/8/2019] valproate (DEPACON) 500 mg in 0.9% sodium chloride 50 mL IVPB, 500 mg, IntraVENous, ONCE, Star Barrow MD    [START ON 1/8/2019] dexamethasone (DECADRON) injection 10 mg, 10 mg, IntraVENous, ONCE, Star Barrow MD    [START ON 1/8/2019] metoclopramide HCl (REGLAN) injection 10 mg, 10 mg, IntraVENous, ONCE, Star Barrow MD  [facility]  [START ON 1/8/2019] dihydroergotamine (DHE-45) 1 mg in 0.9% sodium chloride 50 mL IVPB, 1 mg, IntraVENous, ONCE, Star Barrow MD    [START ON 1/8/2019] diphenhydrAMINE (BENADRYL) injection 25 mg, 25 mg, IntraVENous, ONCE, Star Barrow MD   Allergies: Allergies   Allergen Reactions    Other Food Swelling     All nuts except peanuts    Dilaudid [Hydromorphone (Bulk)] Rash    Morphine Rash    Tramadol Nausea and Vomiting    Lulu Swelling     Swollen lips    Percocet [Oxycodone-Acetaminophen] Rash     Pt tolerated a low dose of 5mg/325mg     Smoker:   Social History     Tobacco Use   Smoking Status Never Smoker   Smokeless Tobacco Never Used     ETOH:   Social History     Substance and Sexual Activity   Alcohol Use No       FH:    No family history on file.     ROS:  General/Constitutional:  No headache, fever, fatigue  Eyes:  No redness, pruritis, pain, visual changes  Ears:  No pain, loss or changes in hearing  Neck:  No swelling, masses, stiffness, pain, or limited movement  Cardiac:   No chest pain, palpitations  Respiratory:  No cough or shortness of breath    GI:  +nausea/vomiting, abdominal pain; no bloody or dark stools     MSK: Myalgias      Physical Exam:  Visit Vitals  /76   Pulse 76   Temp 98.9 °F (37.2 °C) (Oral)   Resp 16   Ht 5' 6\" (1.676 m)   Wt 225 lb (102.1 kg)   SpO2 100%   BMI 36.32 kg/m²       GEN: No apparent distress. Alert and oriented and responds to all questions appropriately. EYES:  Conjunctiva clear; pupils round and reactive to light; extraocular movements are intact. EAR: External ears are normal.  Tympanic membranes are clear and with moderate effusion  LUNGS: Respirations unlabored; clear to auscultation bilaterally  CARDIOVASCULAR: Regular, rate, and rhythm without murmurs, gallops or rubs   ABDOMEN: Soft; nontender; nondistended; normoactive bowel sounds; no masses or organomegaly  NEUROLOGIC:  No focal neurologic deficits. Strength and sensation grossly intact. Coordination and gait grossly intact. EXT: Well perfused. No edema. SKIN: No obvious rashes. Assessment/Plan:    ICD-10-CM ICD-9-CM    1. Lyme disease A69.20 088.81    2. Medication intolerance Z78.9 995.27      Patient presents with concerns of medication intolerance to doxycycline. Initially, the patient made it sound like her symptoms were more short lived and developed after a few weeks of taking medications, which led me to feel this was more likely a viral GI bug. Patient later changed and noted that the symptoms have been ongoing for about 2 weeks, which does make medication side effect more likely. Patient's clinical story is somewhat blurred in looking through the chart; it appears she had 6+ bands on her western blot, though Lyme Ab was equivicol. Given this, will adjust treatment to amoxicillin for now, but we may end up changing to ceftriaxone pending what prior discussions were had with ID and other consultants.  -Amoxicillin 500mg TID to finish course.

## 2019-01-07 NOTE — TELEPHONE ENCOUNTER
Medication prescribed making patient sick. ( Doxycycline) Patient notes she's still in pain.     Call patient at [phone]      thanks

## 2019-01-07 NOTE — PROGRESS NOTES
45year old female diagnosed with Lyme disease with uvetitis    Was placed on doxycycline    States has sxs after taking the medication    Will be switched to amoxicillin    Per notes prescribing MD planned to discuss with ID -- Dr. Ronel Kerns will followup with her    I reviewed with the resident the medical history and the resident's findings on the physical examination. I discussed with the resident the patient's diagnosis and concur with the plan.

## 2019-01-07 NOTE — PROGRESS NOTES
Chief Complaint   Patient presents with    Medication Evaluation     pt states takking doxycyclne for Lyme disease , states medication making her sick ( vomiting)     1. Have you been to the ER, urgent care clinic since your last visit? Hospitalized since your last visit? No    2. Have you seen or consulted any other health care providers outside of the [address] [place] [address] since your last visit? Include any pap smears or colon screening.  No

## 2019-01-08 ENCOUNTER — HOSPITAL ENCOUNTER (OUTPATIENT)
Dept: INFUSION THERAPY | Age: 39
Discharge: HOME | End: 2019-01-08
Payer: MEDICAID

## 2019-01-08 ENCOUNTER — TELEPHONE (OUTPATIENT)
Dept: FAMILY MEDICINE CLINIC | Age: 39
End: 2019-01-08

## 2019-01-08 ENCOUNTER — DOCUMENTATION ONLY (OUTPATIENT)
Dept: NEUROLOGY | Age: 39
End: 2019-01-08

## 2019-01-08 VITALS
RESPIRATION RATE: 16 BRPM | TEMPERATURE: 96.7 F | SYSTOLIC BLOOD PRESSURE: 111 MMHG | DIASTOLIC BLOOD PRESSURE: 68 MMHG | HEART RATE: 72 BPM

## 2019-01-08 PROCEDURE — 74011250636 HC RX REV CODE- 250/636: Performed by: PSYCHIATRY & NEUROLOGY

## 2019-01-08 PROCEDURE — 96365 THER/PROPH/DIAG IV INF INIT: CPT

## 2019-01-08 PROCEDURE — 74011000250 HC RX REV CODE- 250: Performed by: PSYCHIATRY & NEUROLOGY

## 2019-01-08 PROCEDURE — 74011000258 HC RX REV CODE- 258: Performed by: PSYCHIATRY & NEUROLOGY

## 2019-01-08 PROCEDURE — 96375 TX/PRO/DX INJ NEW DRUG ADDON: CPT

## 2019-01-08 RX ORDER — SODIUM CHLORIDE 9 MG/ML
25 INJECTION, SOLUTION INTRAVENOUS AS NEEDED
Status: DISPENSED | OUTPATIENT
Start: 2019-01-08 | End: 2019-01-09

## 2019-01-08 RX ADMIN — SODIUM CHLORIDE 25 ML/HR: 900 INJECTION, SOLUTION INTRAVENOUS at 08:20

## 2019-01-08 RX ADMIN — DIPHENHYDRAMINE HYDROCHLORIDE 25 MG: 50 INJECTION INTRAMUSCULAR; INTRAVENOUS at 09:00

## 2019-01-08 RX ADMIN — METOCLOPRAMIDE 10 MG: 5 INJECTION, SOLUTION INTRAMUSCULAR; INTRAVENOUS at 08:54

## 2019-01-08 RX ADMIN — DEXAMETHASONE SODIUM PHOSPHATE 10 MG: 10 INJECTION INTRAMUSCULAR; INTRAVENOUS at 08:57

## 2019-01-08 RX ADMIN — DIHYDROERGOTAMINE MESYLATE 1 MG: 1 INJECTION, SOLUTION INTRAMUSCULAR; INTRAVENOUS; SUBCUTANEOUS at 09:36

## 2019-01-08 RX ADMIN — VALPROATE SODIUM 500 MG: 100 INJECTION, SOLUTION INTRAVENOUS at 10:30

## 2019-01-08 NOTE — PROGRESS NOTES
Infusion center called, want to know if it is OK to do Chema treatment when pt is on Amoxicillin. Verbal per , that is fine.

## 2019-01-08 NOTE — PROGRESS NOTES
[facility] Progress Note Date: 2019 Name: Cole Harding MRN: [id number] : 1980 Ms. Kade Wong Arrived ambulatory and in no distress for Day 1 of Raskins regimen. Assessment was completed, patient states headache is 6 out of 10 on numeric pain scale. Patient was recently diagnosed with Lyme's disease and is taking amoxicillin and was also in ER on Dec 16 2018 for tooth ache. Notified MD office of these updates and received an ok to treat. Patient is not pregnant states her fallopian tubes were removed. R arm PIV accessed without difficulty, positive bld return. Pt had  nausea after DHE infusion, fluids ran for approx 15 min, pt stated that nausea had subsided, proceeded with treatment. IV flushed and wrapped for treatment tomorrow. Ms. Tameka Copeland vitals were reviewed. Patient Vitals for the past 12 hrs: 
 Temp Pulse Resp BP  
19 1146 96.7 °F (35.9 °C) 72 16 111/68  
19 0826 98.6 °F (37 °C) 81 16 112/81  
 
-medications  were administered as ordered. Reglan IVP Decadron IVP Benadryl IVP DHE IV 
Depacon IV 
 
 
Ms. Kade Wong tolerated treatment well and was discharged from [place] [address] in stable condition at 1155. She is to return on 19 at 0800 for her next appointment. Julee Helton 
2019

## 2019-01-09 ENCOUNTER — HOSPITAL ENCOUNTER (OUTPATIENT)
Dept: INFUSION THERAPY | Age: 39
Discharge: HOME | End: 2019-01-09
Payer: MEDICAID

## 2019-01-09 VITALS
TEMPERATURE: 98.3 F | DIASTOLIC BLOOD PRESSURE: 73 MMHG | HEART RATE: 72 BPM | SYSTOLIC BLOOD PRESSURE: 128 MMHG | RESPIRATION RATE: 18 BRPM

## 2019-01-09 PROCEDURE — 74011000250 HC RX REV CODE- 250: Performed by: PSYCHIATRY & NEUROLOGY

## 2019-01-09 PROCEDURE — 96375 TX/PRO/DX INJ NEW DRUG ADDON: CPT

## 2019-01-09 PROCEDURE — 96365 THER/PROPH/DIAG IV INF INIT: CPT

## 2019-01-09 PROCEDURE — 74011250636 HC RX REV CODE- 250/636: Performed by: PSYCHIATRY & NEUROLOGY

## 2019-01-09 PROCEDURE — 74011000258 HC RX REV CODE- 258: Performed by: PSYCHIATRY & NEUROLOGY

## 2019-01-09 RX ADMIN — METOCLOPRAMIDE 10 MG: 5 INJECTION, SOLUTION INTRAMUSCULAR; INTRAVENOUS at 08:22

## 2019-01-09 RX ADMIN — DIHYDROERGOTAMINE MESYLATE 1 MG: 1 INJECTION, SOLUTION INTRAMUSCULAR; INTRAVENOUS; SUBCUTANEOUS at 09:00

## 2019-01-09 RX ADMIN — DIPHENHYDRAMINE HYDROCHLORIDE 25 MG: 50 INJECTION INTRAMUSCULAR; INTRAVENOUS at 08:27

## 2019-01-09 RX ADMIN — VALPROATE SODIUM 500 MG: 100 INJECTION, SOLUTION INTRAVENOUS at 09:21

## 2019-01-09 RX ADMIN — DEXAMETHASONE SODIUM PHOSPHATE 10 MG: 10 INJECTION INTRAMUSCULAR; INTRAVENOUS at 08:25

## 2019-01-09 NOTE — PROGRESS NOTES
Outpatient Infusion Center Progress Note 0810 Pt admit to [facility] for Raskins day 2 of 3 ambulatory in stable condition. Assessment completed. No new concerns voiced. PIV flushed with positive blood. Pt has c/o headache pain 6/10. No New changes. Visit Vitals /60 Pulse 75 Temp 96.6 °F (35.9 °C) Resp 18 Medications: 
NS @ [facility] Reglan Dexamethasone Benadryl DHE Depacon 1045 Pt tolerated treatment well. PIV flushed and wrapped prior to discharge. D/c home ambulatory in no distress. Pt aware of next appointment scheduled for 1/10/19. 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 No overt bleeding.     PLAN:  - Monitor H/H  - okay to resume anticoagulation from GI standpoint   - diet as tolerated

## 2019-01-10 ENCOUNTER — HOSPITAL ENCOUNTER (OUTPATIENT)
Dept: INFUSION THERAPY | Age: 39
Discharge: HOME | End: 2019-01-10
Payer: MEDICAID

## 2019-01-10 VITALS
SYSTOLIC BLOOD PRESSURE: 122 MMHG | DIASTOLIC BLOOD PRESSURE: 74 MMHG | HEART RATE: 52 BPM | RESPIRATION RATE: 16 BRPM | TEMPERATURE: 97.4 F

## 2019-01-10 PROCEDURE — 96374 THER/PROPH/DIAG INJ IV PUSH: CPT

## 2019-01-10 PROCEDURE — 74011000250 HC RX REV CODE- 250: Performed by: PSYCHIATRY & NEUROLOGY

## 2019-01-10 PROCEDURE — 74011000258 HC RX REV CODE- 258: Performed by: PSYCHIATRY & NEUROLOGY

## 2019-01-10 PROCEDURE — 96375 TX/PRO/DX INJ NEW DRUG ADDON: CPT

## 2019-01-10 PROCEDURE — 74011250636 HC RX REV CODE- 250/636: Performed by: PSYCHIATRY & NEUROLOGY

## 2019-01-10 PROCEDURE — 96365 THER/PROPH/DIAG IV INF INIT: CPT

## 2019-01-10 RX ORDER — SODIUM CHLORIDE 0.9 % (FLUSH) 0.9 %
5-10 SYRINGE (ML) INJECTION AS NEEDED
Status: ACTIVE | OUTPATIENT
Start: 2019-01-10 | End: 2019-01-11

## 2019-01-10 RX ORDER — SODIUM CHLORIDE 9 MG/ML
25 INJECTION, SOLUTION INTRAVENOUS AS NEEDED
Status: DISPENSED | OUTPATIENT
Start: 2019-01-10 | End: 2019-01-11

## 2019-01-10 RX ADMIN — SODIUM CHLORIDE 500 MG: 900 INJECTION, SOLUTION INTRAVENOUS at 10:35

## 2019-01-10 RX ADMIN — DIHYDROERGOTAMINE MESYLATE 1 MG: 1 INJECTION, SOLUTION INTRAMUSCULAR; INTRAVENOUS; SUBCUTANEOUS at 10:08

## 2019-01-10 RX ADMIN — DEXAMETHASONE SODIUM PHOSPHATE 10 MG: 10 INJECTION INTRAMUSCULAR; INTRAVENOUS at 09:11

## 2019-01-10 RX ADMIN — DIPHENHYDRAMINE HYDROCHLORIDE 25 MG: 50 INJECTION INTRAMUSCULAR; INTRAVENOUS at 09:30

## 2019-01-10 RX ADMIN — METOCLOPRAMIDE 10 MG: 5 INJECTION, SOLUTION INTRAMUSCULAR; INTRAVENOUS at 08:36

## 2019-01-10 RX ADMIN — SODIUM CHLORIDE 25 ML/HR: 900 INJECTION, SOLUTION INTRAVENOUS at 08:33

## 2019-01-10 RX ADMIN — Medication 10 ML: at 08:33

## 2019-01-10 NOTE — PROGRESS NOTES
[facility] Residency Attending Addendum:  Patient encounter was discussed on the day of the encounter with Brayden Randolph DO, performing the key elements of the service. I discussed the findings, assessment and plan with the resident and agree with the resident's findings and plan as documented in the resident's note.       Hank Gordon MD, CAQSM, RMSK

## 2019-01-10 NOTE — PROGRESS NOTES
Outpatient Infusion Center Short Visit Progress Note 0853 Patient admitted to [facility] for D 3 of 3 [place] ambulatory in stable condition. Assessment completed. No new concerns voiced. Pt denies pregnancy Visit Vitals /82 Pulse (!) 59 Temp 97.6 °F (36.4 °C) Resp 18 PIV with positive blood return. Medications: 
Reglan ivp Dexamethasone ivp Benadryl ivp Wait 30 minutes prior to next medications. DHE IVP Depacon iv 
 
1200 Patient tolerated treatment well. Patient discharged from [facility] ambulatory in no distress at 1200. Patient aware of no further appts scheduled at this time.

## 2019-01-13 NOTE — PROGRESS NOTES
1/13/19 5:25PM  
 
Called patient at cell. Not in service. Called at home. Mother picked up. Gave another number [phone]. Instructed her to let her daughter know to give me a call tomorrow morning if I was not able to get in contact. Called the number she provides. First time someone picked up and then hung up after I introduced myself. The 2nd time it was sent to .

## 2019-01-14 ENCOUNTER — TELEPHONE (OUTPATIENT)
Dept: GENERAL RADIOLOGY | Age: 39
End: 2019-01-14

## 2019-01-14 ENCOUNTER — TELEPHONE (OUTPATIENT)
Dept: FAMILY MEDICINE CLINIC | Age: 39
End: 2019-01-14

## 2019-01-14 ENCOUNTER — DOCUMENTATION ONLY (OUTPATIENT)
Dept: FAMILY MEDICINE CLINIC | Age: 39
End: 2019-01-14

## 2019-01-14 DIAGNOSIS — A69.20 LYME DISEASE: Primary | ICD-10-CM

## 2019-01-14 RX ORDER — CEFTRIAXONE 1 G/1
2 INJECTION, POWDER, FOR SOLUTION INTRAMUSCULAR; INTRAVENOUS DAILY
Qty: 56 VIAL | Refills: 0
Start: 2019-01-14 | End: 2019-01-28

## 2019-01-14 NOTE — TELEPHONE ENCOUNTER
1/14/2019 10:31 AM    Spoke with patient on the phone. Will come in at 8 AM tomorrow morning. Patient will need PICC line per ID and given IV CTX 2g daily x 28 days. Will coordinate IR PICC placement and home health tomorrow.     Yovanny Houston DO

## 2019-01-14 NOTE — TELEPHONE ENCOUNTER
Per call from [facility] with Jennifer, asking that Dr. Eric Soriano place order in system for [facility] . The current order in system is not the doctor's order per [facility].  Patient have appt in the morning at 9:00 am    Questions call [phone]      thanks

## 2019-01-14 NOTE — TELEPHONE ENCOUNTER
1/13/19 5:25PM     Called patient at cell. Not in service. Called at home. Mother picked up. Gave another number [phone]. Instructed her to let her daughter know to give me a call tomorrow morning if I was not able to get in contact. Called the number she provides. First time someone picked up and then hung up after I introduced myself. The 2nd time it was sent to .     1/14/19 8:28 AM    Attempted to call patient again. Did not answer phone. No option for VM. Will attempt again later. Have made multiple attempts to get in touch with patient. Have left message with Mother for patient to call office back. If she calls back please offer her an appointment as soon as possible. This patient will need to start IV abx for Lyme Disease.      Adriana Lazar, DO  Family Med Resident, PGY2

## 2019-01-14 NOTE — TELEPHONE ENCOUNTER
----- Message from Mykel Urias sent at 1/14/2019  8:40 AM EST -----  Regarding: Dr Cindy Wynne is returning a call from the office.  Phone: [phone]

## 2019-01-14 NOTE — TELEPHONE ENCOUNTER
Per call from [facility] with [facility], ph [phone]    There are questions to be answered    Who is providing DME medication to patient? When was [facility] line placed? Asking for first time dose for medication?     Questions call [phone]    thanks

## 2019-01-14 NOTE — PROGRESS NOTES
1/14/2019 10:35 AM    Called IR to coordinate PICC placement. States they will review chart and call patient to coordinate placement. Will place referral to home health.     Paris Vanessa DO  Family Med Resident, PGY2

## 2019-01-15 ENCOUNTER — OFFICE VISIT (OUTPATIENT)
Dept: FAMILY MEDICINE CLINIC | Age: 39
End: 2019-01-15

## 2019-01-15 ENCOUNTER — TELEPHONE (OUTPATIENT)
Dept: FAMILY MEDICINE CLINIC | Age: 39
End: 2019-01-15

## 2019-01-15 ENCOUNTER — HOSPITAL ENCOUNTER (OUTPATIENT)
Dept: GENERAL RADIOLOGY | Age: 39
Discharge: HOME | End: 2019-01-15
Attending: FAMILY MEDICINE

## 2019-01-15 VITALS
HEIGHT: 66 IN | WEIGHT: 222 LBS | SYSTOLIC BLOOD PRESSURE: 95 MMHG | BODY MASS INDEX: 35.68 KG/M2 | OXYGEN SATURATION: 97 % | DIASTOLIC BLOOD PRESSURE: 65 MMHG | RESPIRATION RATE: 18 BRPM | HEART RATE: 71 BPM | TEMPERATURE: 99 F

## 2019-01-15 DIAGNOSIS — A69.20 LYME DISEASE: Primary | ICD-10-CM

## 2019-01-15 DIAGNOSIS — T36.4X5D: ICD-10-CM

## 2019-01-15 DIAGNOSIS — A69.20 LYME DISEASE: ICD-10-CM

## 2019-01-15 DIAGNOSIS — H20.9 ANTERIOR UVEITIS: ICD-10-CM

## 2019-01-15 NOTE — PROGRESS NOTES
HPI     Chief Complaint   Patient presents with    Follow-up     Lyme disease follow up     She is a 45 y.o. female who presents for Lyme Disease    Lyme Disease  - Patient is scheduled for PICC line placement today  - Home Health needs first dose to be given at infusion center before they can set up care  - Has not picked up CTX from pharmacy yet  - States body aches have been getting worse  - States she was not able to tolerate Doxy because she was having bad headaches, nausea, insom  Review of Systems   Gastrointestinal: Positive for nausea. Musculoskeletal: Positive for myalgias. Neurological: Positive for headaches. Psychiatric/Behavioral: Positive for sleep disturbance. Reviewed PmHx, RxHx, FmHx, SocHx, AllgHx and updated and dated in the chart. Physical Exam:  Visit Vitals  BP 95/65 (BP 1 Location: Right arm, BP Patient Position: Sitting)   Pulse 71   Temp 99 °F (37.2 °C) (Oral)   Resp 18   Ht 5' 6\" (1.676 m)   Wt 222 lb (100.7 kg)   LMP 01/05/2019 (Exact Date)   SpO2 97%   BMI 35.83 kg/m²     Physical Exam   Constitutional: She appears well-developed and well-nourished. No distress. Appears tired   Cardiovascular: Normal rate, regular rhythm and normal heart sounds. Exam reveals no gallop and no friction rub. No murmur heard. Pulmonary/Chest: Effort normal and breath sounds normal. No stridor. No respiratory distress. She has no wheezes. She has no rales. Skin: Skin is warm and dry. She is not diaphoretic. Nursing note and vitals reviewed. No results found for this or any previous visit (from the past 12 hour(s)).        Assessment / Plan     Lyme Disease, disseminated with Anterior Uveitis  - Will call pharmacy to determine when CTX will be available, per ID patient is to receive IV CTX 2g daily x 28 days  - After CTX delivered will place PICC line and set up first infusion at infusion center  - Then set up [facility] with [facility] for the rest of her daily administration  - Follow up in 1 month after completion of therapy    Patient discussed with Dr. Elmer Stack (Attending)    I have discussed the diagnosis with the patient and the intended plan as seen in the above orders. The patient has received an after-visit summary and questions were answered concerning future plans. I have discussed medication side effects and warnings with the patient as well.     Qasim Rolon DO  Family Medicine Resident

## 2019-01-15 NOTE — PATIENT INSTRUCTIONS
[address] [facility] [address]  Address: [address] [facility], [address]   Hours:   Open now ·   Add full hours   Phone: [phone]         Lyme Disease: Care Instructions  Your Care Instructions    Lyme disease is a bacterial infection spread by ticks. Antibiotics can treat Lyme disease. If you do not treat Lyme disease, it can lead to problems with your skin, joints, heart, and nervous system. These problems can develop weeks, months, or even years after you get the infection. Your doctor may prescribe antibiotics even if it is not yet certain that you have Lyme disease. Follow-up care is a key part of your treatment and safety. Be sure to make and go to all appointments, and call your doctor if you are having problems. It's also a good idea to know your test results and keep a list of the medicines you take. How can you care for yourself at home? · Take your antibiotics as directed. Do not stop taking them just because you feel better. You need to take the full course of antibiotics. · Take an over-the-counter pain medicine if needed, such as acetaminophen (Tylenol), ibuprofen (Advil, Motrin), or naproxen (Aleve). Read and follow all instructions on the label. To prevent Lyme disease in the future  · Avoid ticks:  ? Learn where ticks are found in your community, and stay away from those areas if possible. ? Cover as much of your body as possible when you work or play in grassy or wooded areas. ? Use insect repellents, such as products containing DEET. You can spray them on your skin. ? Take steps to control ticks on your property if you live in an area where Lyme disease occurs. Clear leaves, brush, tall grasses, woodpiles, and stone fences from around your house and the edges of your yard or garden. This may help get rid of ticks. · When you come in from outdoors, check your body for ticks, including your groin, head, and underarms. The ticks may be about the size of a poppy seed.  If no one else can help you check for ticks on your scalp, comb your hair with a fine-tooth comb. · If you find a tick, remove it quickly. If you can't remove it with your fingers, use tweezers to grasp the tick as close to its mouth (the part in your skin) as possible. Slowly pull the tick straight out--do not twist or yank--until its mouth releases from your skin. If part of the tick stays in the skin, leave it alone. It will likely come out on its own in a few days. · Ticks can come into your house on clothing, outdoor gear, and pets. These ticks can fall off and attach to you. ? Check your clothing and outdoor gear. Remove any ticks you find. Then put your clothing in a clothes dryer on high heat for 1 hour to kill any ticks that might remain. ? Check your pets for ticks after they have been outdoors. · When hiking in the woods, carry a small dry jar or ziplock bag. If you find a tick on your body, remove the tick and put it in the jar or bag. Store the container in the freezer so you can give it to your doctor if symptoms develop. The tick can be tested to learn whether it is carrying the bacteria that cause Lyme disease. When should you call for help? Call your doctor now or seek immediate medical care if:    · You are confused or cannot think clearly.     · You have a headache or stiff neck.     · You have a new or worse rash.     · You have symptoms of infection, such as:  ? Increased pain, swelling, warmth, or redness. ? Red streaks leading from the area. ? Pus draining from the area. ? A fever.    Watch closely for changes in your health, and be sure to contact your doctor if:    · You have new or worse weakness or muscle pain.     · You have new joint pain.     · You do not get better as expected. Where can you learn more? Go to http://sarmad-farhana.info/. Enter Y599 in the search box to learn more about \"Lyme Disease: Care Instructions. \"  Current as of: November 18, 2017  Content Version: 11.8  © 9462-1629 [employer], [employer]. Care instructions adapted under license by [employer] (which disclaims liability or warranty for this information).  If you have questions about a medical condition or this instruction, always ask your healthcare professional. [address] any warranty or liability for your use of this information.

## 2019-01-15 NOTE — PROGRESS NOTES
45year old female with suspected Lyme disease    Will be getting PICC line today  Will need to treat with ceftriaxone per ID Dr. Bulmaro Evans    I reviewed with the resident the medical history and the resident's findings on the physical examination. I discussed with the resident the patient's diagnosis and concur with the plan.

## 2019-01-15 NOTE — TELEPHONE ENCOUNTER
----- Message from Karuna Maya sent at 1/15/2019  9:41 AM EST -----  Regarding: Dr. Giana Magallon is requesting a call back in reference to a prescription not being received by [facility] (on file), for \"Rocephin\".  Phone: [phone]

## 2019-01-15 NOTE — TELEPHONE ENCOUNTER
Dr. Chavo Savage spoke on the phone and script for PICC line placement was sent to [facility] (Fax: [phone]).

## 2019-01-15 NOTE — PROGRESS NOTES
Pt arrives for PICC placement. Unclear of plan for infusion center to give first dose of Rocephin; unable to contact Dez and message left requesting callback to [facility] Radiology nurses. Spoke to Dr Madie Charles via telephone to clarify availability of med and appointment for infusion center. Dr Kaya Silverio will contact [facility] / [facility] Rad Nurses at [phone] to advise when all arrangements are made and we will contact pt for PICC placement. D/W patient and she is agreeable.   Pt escorted back to pt access area to leave

## 2019-01-15 NOTE — TELEPHONE ENCOUNTER
Nettie Andersen 85 with [facility] calling to speak with nurse to about [facility] and discuss medication concerns.  Spoke with Keke Ridley /Nurse and she will assist with call

## 2019-01-15 NOTE — PROGRESS NOTES
Identified Patient with two Patient identifiers (Name and ). Two Patient Identifiers confirmed. Reviewed record in preparation for visit and have obtained necessary documentation. Chief Complaint   Patient presents with    Follow-up     Lyme disease follow up       Visit Vitals  BP 95/65 (BP 1 Location: Right arm, BP Patient Position: Sitting)   Pulse 71   Temp 99 °F (37.2 °C) (Oral)   Resp 18   Ht 5' 6\" (1.676 m)   Wt 222 lb (100.7 kg)   SpO2 97%   BMI 35.83 kg/m²       1. Have you been to the ER, urgent care clinic since your last visit? Hospitalized since your last visit? No    2. Have you seen or consulted any other health care providers outside of the [address] [place] [address] since your last visit? Include any pap smears or colon screening.  No

## 2019-01-15 NOTE — TELEPHONE ENCOUNTER
Per call from [facility] with [facility]/Nurse    She notes that Dr. Jen Rodriguez wanted Shaan. She is asking that you fax over hand written script for [employer].     Fax to [phone]   [phone]      thanks

## 2019-01-16 ENCOUNTER — HOME HEALTH ADMISSION (OUTPATIENT)
Dept: HOME HEALTH SERVICES | Facility: HOME HEALTH | Age: 39
End: 2019-01-16

## 2019-01-16 ENCOUNTER — DOCUMENTATION ONLY (OUTPATIENT)
Dept: FAMILY MEDICINE CLINIC | Age: 39
End: 2019-01-16

## 2019-01-16 NOTE — TELEPHONE ENCOUNTER
----- Message from Bryce Cody sent at 1/16/2019 10:27 AM EST -----  Regarding: Dr Yasir Dunn: [phone]  Pt is returning a call from the nurse .  Best contact [phone]

## 2019-01-16 NOTE — TELEPHONE ENCOUNTER
Attempted to call patient again to follow up cost of medication. Did not answer. Will call again later.

## 2019-01-16 NOTE — TELEPHONE ENCOUNTER
1/16/2019 1:17 PM    Patient states she cannot afford CTX wo prior auth. Have sent message to RN to start PA process.     Qasim Rolon, DO

## 2019-01-16 NOTE — PROGRESS NOTES
Prior authorization form for rocephin was completed,signed, and faxed over to [facility]. Waiting for a response.

## 2019-01-17 ENCOUNTER — TELEPHONE (OUTPATIENT)
Dept: FAMILY MEDICINE CLINIC | Age: 39
End: 2019-01-17

## 2019-01-17 NOTE — TELEPHONE ENCOUNTER
[phone]  Bianka Crabtree of [facility] is waiting for your return call.

## 2019-01-17 NOTE — PROGRESS NOTES
VAT Note - Called pt at [phone] and left voicemail requesting callback to follow up on plan for PICC placement. Rec'd faxed RX for PICC placement; noted in chart that request for auth sent to [facility] 1/16/19.    1300 Spoke to pt. She states still awaiting Auth for antibiotics otherwise would have out of pocket payment. Will maintain contact with patient and review chart for plan.

## 2019-01-21 ENCOUNTER — TELEPHONE (OUTPATIENT)
Dept: FAMILY MEDICINE CLINIC | Age: 39
End: 2019-01-21

## 2019-01-21 NOTE — TELEPHONE ENCOUNTER
Called to initiate prior authorization over the phone with [employer]. It was completed and they will respond to us plata 24 hours via fax. Authorization reference number is [id number]. Will call back for update after 24 hours.

## 2019-01-22 ENCOUNTER — TELEPHONE (OUTPATIENT)
Dept: FAMILY MEDICINE CLINIC | Age: 39
End: 2019-01-22

## 2019-01-22 DIAGNOSIS — R10.84 GENERALIZED ABDOMINAL PAIN: Primary | ICD-10-CM

## 2019-01-22 NOTE — TELEPHONE ENCOUNTER
----- Message from Delia Donovan sent at 1/22/2019 10:09 AM EST -----  Regarding: Dr. Raul Varela  Pt requesting a call back in regards to a procedure that had been discussed. Best contact [phone].

## 2019-01-22 NOTE — TELEPHONE ENCOUNTER
Received notice form patient insurance that prior authorization for rocephin was approved. Calling patient pharmacy to notify them so they can resubmit prescription and verify that it was accepted. [facility] they do not carry this medication at their location. They informed me that [facility] [place] [facility] in [facility].  Will call them and see if they can dispense this medication after giving them a verbal. Will also update  on this status

## 2019-01-22 NOTE — TELEPHONE ENCOUNTER
1/22/2019 6:36 PM    Spoke with Ms Nevaeh Pearce. Updated her that PA for CTX went through. Rx has been ordered at [address] [place] [address] [phone]. Have informed pt abx will be here at the latest on Thurs AM. Will coordinate PICC placement and infusion center appt tomorrow.     Maggie Chavez, DO

## 2019-01-23 ENCOUNTER — TELEPHONE (OUTPATIENT)
Dept: NEUROLOGY | Age: 39
End: 2019-01-23

## 2019-01-23 ENCOUNTER — TELEPHONE (OUTPATIENT)
Dept: FAMILY MEDICINE CLINIC | Age: 39
End: 2019-01-23

## 2019-01-23 NOTE — TELEPHONE ENCOUNTER
Casie with [facility] Team is returning call to nurse Trixie LOCKE     Call [phone]

## 2019-01-23 NOTE — TELEPHONE ENCOUNTER
Called Holly Merrill at [facility] radiology to discuss patient having PICC line placed. Did not get an answer, left message for them to give us a call back.

## 2019-01-23 NOTE — TELEPHONE ENCOUNTER
Returned call to [facility] regarding PICC line placement. Patient stated prescription for antibiotics should be here today, 1/23/19 by 3:00pm . Once the prescription is received the asad is for the PICC line to be placed. I will reach out to both home health and infusion center to set up appointment for care of the PICC line.

## 2019-01-23 NOTE — TELEPHONE ENCOUNTER
Received request to get PA for Galdino. Submitted through Cover my meds.     PA pending  Key# [id number]

## 2019-01-23 NOTE — TELEPHONE ENCOUNTER
Called patient regarding PICC line placement and medication arrival. [facility] to arrive at 3:00 today. Returned called to [facility] at doctors office of ordering PICC Dr. Eric Soriano regarding message left to this office for coordination of line placement and infusion center appointment for 1st infusion of Rocephin. Trixie to return my call and then I will call patient to schedule time to come in for PICC placement.

## 2019-01-23 NOTE — TELEPHONE ENCOUNTER
Called over to the infusion center to get patient scheduled. Although order were placed in [address] [facility], per Rl Mayes she will send us a form to e completed and signed by the doctor. After it was signed, it needs to be faxed to [facility] at [phone]. Once they receive fax they will receive authorization form patient insurance and then call and get patient scheduled for both the infusion and the PICC line placement. Will wait for fax and have  sign.

## 2019-01-24 ENCOUNTER — HOSPITAL ENCOUNTER (INPATIENT)
Age: 39
LOS: 4 days | Discharge: HOME | DRG: 724 | End: 2019-01-28
Attending: EMERGENCY MEDICINE | Admitting: INTERNAL MEDICINE
Payer: MEDICAID

## 2019-01-24 ENCOUNTER — TELEPHONE (OUTPATIENT)
Dept: FAMILY MEDICINE CLINIC | Age: 39
End: 2019-01-24

## 2019-01-24 ENCOUNTER — APPOINTMENT (OUTPATIENT)
Dept: CT IMAGING | Age: 39
DRG: 724 | End: 2019-01-24
Attending: EMERGENCY MEDICINE
Payer: MEDICAID

## 2019-01-24 DIAGNOSIS — A69.20 LYME DISEASE: Primary | ICD-10-CM

## 2019-01-24 DIAGNOSIS — H20.9 UVEITIS OF RIGHT EYE: ICD-10-CM

## 2019-01-24 LAB
ALBUMIN SERPL-MCNC: 3.1 G/DL (ref 3.5–5)
ALBUMIN/GLOB SERPL: 0.8 {RATIO} (ref 1.1–2.2)
ALP SERPL-CCNC: 72 U/L (ref 45–117)
ALT SERPL-CCNC: 27 U/L (ref 12–78)
ANION GAP SERPL CALC-SCNC: 6 MMOL/L (ref 5–15)
AST SERPL-CCNC: 14 U/L (ref 15–37)
BASOPHILS # BLD: 0 K/UL (ref 0–0.1)
BASOPHILS NFR BLD: 0 % (ref 0–1)
BILIRUB SERPL-MCNC: 0.2 MG/DL (ref 0.2–1)
BUN SERPL-MCNC: 11 MG/DL (ref 6–20)
BUN/CREAT SERPL: 13 (ref 12–20)
CALCIUM SERPL-MCNC: 8 MG/DL (ref 8.5–10.1)
CHLORIDE SERPL-SCNC: 114 MMOL/L (ref 97–108)
CO2 SERPL-SCNC: 22 MMOL/L (ref 21–32)
COMMENT, HOLDF: NORMAL
CREAT SERPL-MCNC: 0.83 MG/DL (ref 0.55–1.02)
DIFFERENTIAL METHOD BLD: ABNORMAL
EOSINOPHIL # BLD: 0.4 K/UL (ref 0–0.4)
EOSINOPHIL NFR BLD: 5 % (ref 0–7)
ERYTHROCYTE [DISTWIDTH] IN BLOOD BY AUTOMATED COUNT: 14.7 % (ref 11.5–14.5)
GLOBULIN SER CALC-MCNC: 4 G/DL (ref 2–4)
GLUCOSE SERPL-MCNC: 86 MG/DL (ref 65–100)
HCT VFR BLD AUTO: 39.2 % (ref 35–47)
HGB BLD-MCNC: 11.9 G/DL (ref 11.5–16)
IMM GRANULOCYTES # BLD AUTO: 0 K/UL (ref 0–0.04)
IMM GRANULOCYTES NFR BLD AUTO: 0 % (ref 0–0.5)
LYMPHOCYTES # BLD: 2.3 K/UL (ref 0.8–3.5)
LYMPHOCYTES NFR BLD: 31 % (ref 12–49)
MCH RBC QN AUTO: 28.2 PG (ref 26–34)
MCHC RBC AUTO-ENTMCNC: 30.4 G/DL (ref 30–36.5)
MCV RBC AUTO: 92.9 FL (ref 80–99)
MONOCYTES # BLD: 0.7 K/UL (ref 0–1)
MONOCYTES NFR BLD: 9 % (ref 5–13)
NEUTS SEG # BLD: 4.1 K/UL (ref 1.8–8)
NEUTS SEG NFR BLD: 55 % (ref 32–75)
NRBC # BLD: 0 K/UL (ref 0–0.01)
NRBC BLD-RTO: 0 PER 100 WBC
PLATELET # BLD AUTO: 178 K/UL (ref 150–400)
PMV BLD AUTO: 10.8 FL (ref 8.9–12.9)
POTASSIUM SERPL-SCNC: 3.9 MMOL/L (ref 3.5–5.1)
PROT SERPL-MCNC: 7.1 G/DL (ref 6.4–8.2)
RBC # BLD AUTO: 4.22 M/UL (ref 3.8–5.2)
SAMPLES BEING HELD,HOLD: NORMAL
SODIUM SERPL-SCNC: 142 MMOL/L (ref 136–145)
WBC # BLD AUTO: 7.4 K/UL (ref 3.6–11)

## 2019-01-24 PROCEDURE — 85025 COMPLETE CBC W/AUTO DIFF WBC: CPT

## 2019-01-24 PROCEDURE — 96375 TX/PRO/DX INJ NEW DRUG ADDON: CPT

## 2019-01-24 PROCEDURE — 99283 EMERGENCY DEPT VISIT LOW MDM: CPT

## 2019-01-24 PROCEDURE — 74011250637 HC RX REV CODE- 250/637: Performed by: INTERNAL MEDICINE

## 2019-01-24 PROCEDURE — 96361 HYDRATE IV INFUSION ADD-ON: CPT

## 2019-01-24 PROCEDURE — 96374 THER/PROPH/DIAG INJ IV PUSH: CPT

## 2019-01-24 PROCEDURE — 74011000250 HC RX REV CODE- 250: Performed by: INTERNAL MEDICINE

## 2019-01-24 PROCEDURE — 70450 CT HEAD/BRAIN W/O DYE: CPT

## 2019-01-24 PROCEDURE — 74011250636 HC RX REV CODE- 250/636: Performed by: EMERGENCY MEDICINE

## 2019-01-24 PROCEDURE — 65270000032 HC RM SEMIPRIVATE

## 2019-01-24 PROCEDURE — 74011000258 HC RX REV CODE- 258: Performed by: EMERGENCY MEDICINE

## 2019-01-24 PROCEDURE — 36415 COLL VENOUS BLD VENIPUNCTURE: CPT

## 2019-01-24 PROCEDURE — 80053 COMPREHEN METABOLIC PANEL: CPT

## 2019-01-24 PROCEDURE — 99284 EMERGENCY DEPT VISIT MOD MDM: CPT

## 2019-01-24 PROCEDURE — 74011000250 HC RX REV CODE- 250: Performed by: EMERGENCY MEDICINE

## 2019-01-24 RX ORDER — OXYCODONE AND ACETAMINOPHEN 5; 325 MG/1; MG/1
1-2 TABLET ORAL
Status: DISCONTINUED | OUTPATIENT
Start: 2019-01-24 | End: 2019-01-28 | Stop reason: HOSPADM

## 2019-01-24 RX ORDER — ONDANSETRON 2 MG/ML
4 INJECTION INTRAMUSCULAR; INTRAVENOUS
Status: COMPLETED | OUTPATIENT
Start: 2019-01-24 | End: 2019-01-24

## 2019-01-24 RX ORDER — PREDNISOLONE ACETATE 10 MG/ML
1 SUSPENSION/ DROPS OPHTHALMIC
Status: COMPLETED | OUTPATIENT
Start: 2019-01-24 | End: 2019-01-24

## 2019-01-24 RX ORDER — PROPRANOLOL HYDROCHLORIDE 10 MG/1
10 TABLET ORAL
Status: DISCONTINUED | OUTPATIENT
Start: 2019-01-24 | End: 2019-01-28 | Stop reason: HOSPADM

## 2019-01-24 RX ORDER — SODIUM CHLORIDE 0.9 % (FLUSH) 0.9 %
5-40 SYRINGE (ML) INJECTION EVERY 8 HOURS
Status: DISCONTINUED | OUTPATIENT
Start: 2019-01-24 | End: 2019-01-28 | Stop reason: HOSPADM

## 2019-01-24 RX ORDER — KETOROLAC TROMETHAMINE 30 MG/ML
30 INJECTION, SOLUTION INTRAMUSCULAR; INTRAVENOUS
Status: COMPLETED | OUTPATIENT
Start: 2019-01-24 | End: 2019-01-24

## 2019-01-24 RX ORDER — SODIUM CHLORIDE 0.9 % (FLUSH) 0.9 %
5-40 SYRINGE (ML) INJECTION AS NEEDED
Status: DISCONTINUED | OUTPATIENT
Start: 2019-01-24 | End: 2019-01-28 | Stop reason: HOSPADM

## 2019-01-24 RX ORDER — TOPIRAMATE 25 MG/1
25 TABLET ORAL EVERY 12 HOURS
Status: DISCONTINUED | OUTPATIENT
Start: 2019-01-25 | End: 2019-01-28 | Stop reason: HOSPADM

## 2019-01-24 RX ORDER — PREDNISOLONE SODIUM PHOSPHATE 10 MG/ML
1 SOLUTION/ DROPS OPHTHALMIC 4 TIMES DAILY
Status: DISCONTINUED | OUTPATIENT
Start: 2019-01-24 | End: 2019-01-28 | Stop reason: HOSPADM

## 2019-01-24 RX ORDER — HEPARIN SODIUM 5000 [USP'U]/ML
5000 INJECTION, SOLUTION INTRAVENOUS; SUBCUTANEOUS EVERY 8 HOURS
Status: DISCONTINUED | OUTPATIENT
Start: 2019-01-24 | End: 2019-01-28 | Stop reason: HOSPADM

## 2019-01-24 RX ORDER — CYCLOBENZAPRINE HCL 10 MG
5 TABLET ORAL
Status: DISCONTINUED | OUTPATIENT
Start: 2019-01-24 | End: 2019-01-28 | Stop reason: HOSPADM

## 2019-01-24 RX ORDER — BUDESONIDE 0.5 MG/2ML
500 INHALANT ORAL
Status: DISCONTINUED | OUTPATIENT
Start: 2019-01-24 | End: 2019-01-28 | Stop reason: HOSPADM

## 2019-01-24 RX ORDER — ONDANSETRON 2 MG/ML
4 INJECTION INTRAMUSCULAR; INTRAVENOUS
Status: DISCONTINUED | OUTPATIENT
Start: 2019-01-24 | End: 2019-01-28 | Stop reason: HOSPADM

## 2019-01-24 RX ORDER — SODIUM CHLORIDE 9 MG/ML
1000 INJECTION, SOLUTION INTRAVENOUS CONTINUOUS
Status: DISCONTINUED | OUTPATIENT
Start: 2019-01-24 | End: 2019-01-27

## 2019-01-24 RX ORDER — ONDANSETRON 4 MG/1
4 TABLET, ORALLY DISINTEGRATING ORAL
Status: DISCONTINUED | OUTPATIENT
Start: 2019-01-24 | End: 2019-01-28 | Stop reason: HOSPADM

## 2019-01-24 RX ORDER — LORAZEPAM 0.5 MG/1
0.5 TABLET ORAL
Status: DISCONTINUED | OUTPATIENT
Start: 2019-01-24 | End: 2019-01-28 | Stop reason: HOSPADM

## 2019-01-24 RX ORDER — ALBUTEROL SULFATE 0.83 MG/ML
2.5 SOLUTION RESPIRATORY (INHALATION)
Status: DISCONTINUED | OUTPATIENT
Start: 2019-01-24 | End: 2019-01-28 | Stop reason: HOSPADM

## 2019-01-24 RX ADMIN — PROPRANOLOL HYDROCHLORIDE 10 MG: 10 TABLET ORAL at 23:36

## 2019-01-24 RX ADMIN — PREDNISOLONE SODIUM PHOSPHATE 1 DROP: 10 SOLUTION/ DROPS OPHTHALMIC at 23:38

## 2019-01-24 RX ADMIN — ONDANSETRON 4 MG: 2 INJECTION INTRAMUSCULAR; INTRAVENOUS at 16:20

## 2019-01-24 RX ADMIN — KETOROLAC TROMETHAMINE 30 MG: 30 INJECTION, SOLUTION INTRAMUSCULAR; INTRAVENOUS at 16:20

## 2019-01-24 RX ADMIN — CYCLOBENZAPRINE HYDROCHLORIDE 5 MG: 10 TABLET, FILM COATED ORAL at 23:37

## 2019-01-24 RX ADMIN — SODIUM CHLORIDE 1000 ML: 900 INJECTION, SOLUTION INTRAVENOUS at 16:20

## 2019-01-24 RX ADMIN — PREDNISOLONE ACETATE 1 DROP: 10 SUSPENSION/ DROPS OPHTHALMIC at 18:04

## 2019-01-24 RX ADMIN — CEFTRIAXONE SODIUM 2 G: 2 INJECTION, POWDER, FOR SOLUTION INTRAMUSCULAR; INTRAVENOUS at 17:22

## 2019-01-24 NOTE — ED PROVIDER NOTES
The patient presents to the ED with n/v, headache, and decreased vision. She was referred by her PCP. She was diagnosed with R eye iritis/uveitis in November. She had further testing and the uveitis was believed to be caused by lyme disease. She was placed on oral Doxycycline, but was unable to tolerate the oral doxycycline. She took doxycycline the second week in December. She developed stomach pain and headache. Dr. Soledad Restrepo was contacted and he recommended IV Rocephin. The patient's IV Rocephin has not been started because of difficulty with prior authorizations through insurance. The Rocephin and home health are approved. However, the supplies are not yet approved. She comes to ED for increased headache, decreased vision from R eye, facial numbness, and nausea and vomiting. She denies any fever. She has no other concerns. Past Medical History:  
Diagnosis Date  Arthritis  Depression  Headache  Ill-defined condition   
 scoliosis, gall stones, anemia  Seizures ([address] Past Surgical History:  
Procedure Laterality Date  HX  SECTION    
 x 4  
 HX HERNIA REPAIR    
 HX TUBAL LIGATION No family history on file. Social History Socioeconomic History  Marital status: SINGLE Spouse name: Not on file  Number of children: Not on file  Years of education: Not on file  Highest education level: Not on file Social Needs  Financial resource strain: Not on file  Food insecurity - worry: Not on file  Food insecurity - inability: Not on file  Transportation needs - medical: Not on file  Transportation needs - non-medical: Not on file Occupational History  Not on file Tobacco Use  Smoking status: Never Smoker  Smokeless tobacco: Never Used Substance and Sexual Activity  Alcohol use: No  
 Drug use: No  
 Sexual activity: Not Currently Other Topics Concern  Not on file Social History Narrative  Not on file ALLERGIES: Other food; Dilaudid [hydromorphone (bulk)]; Morphine; Tramadol; [address]; and Percocet [oxycodone-acetaminophen] Review of Systems Constitutional: Negative for appetite change and fever. HENT: Negative for congestion, nosebleeds and sore throat. Eyes: Positive for pain, redness and visual disturbance. Negative for discharge. Respiratory: Negative for cough and shortness of breath. Cardiovascular: Negative for chest pain. Gastrointestinal: Positive for nausea and vomiting. Negative for abdominal pain and diarrhea. Genitourinary: Negative for dysuria. Musculoskeletal: Positive for arthralgias, back pain and myalgias. Negative for joint swelling and neck pain. Skin: Negative for rash. Neurological: Positive for headaches. Negative for weakness. Hematological: Negative for adenopathy. Psychiatric/Behavioral: Negative. All other systems reviewed and are negative. Vitals:  
 01/24/19 1409 Pulse: 88 SpO2: 98% Physical Exam  
Constitutional: She is oriented to person, place, and time. She appears well-developed and well-nourished. HENT:  
Head: Normocephalic and atraumatic. Eyes: Conjunctivae and EOM are normal. Pupils are equal, round, and reactive to light. Neck: Normal range of motion. Neck supple. Cardiovascular: Normal rate, regular rhythm and normal heart sounds. Pulmonary/Chest: Effort normal and breath sounds normal.  
Abdominal: Soft. Bowel sounds are normal.  
Neurological: She is alert and oriented to person, place, and time. No cranial nerve deficit or sensory deficit. She exhibits normal muscle tone. Coordination normal.  
Skin: Skin is warm and dry. Psychiatric: She has a normal mood and affect. Nursing note and vitals reviewed. MDM Procedures CONSULT NOTE: 
3:37 PM Yessy Harrison MD spoke with Dr. Vimal Reyes, Consult for PCP. Discussed available diagnostic tests and clinical findings.   Dr. Vimal Reyes states Pt was sent in for increasing headaches and visual changes. Dr. Michael Kahn recommends ophthalmology evaluation. CONSULT NOTE: 
3:49 PM Carol Rome MD spoke with Dr. Nadya Ramos, Consult for Infectious disease. Discussed available diagnostic tests and clinical findings. Dr. Nadya Ramos recommends consulting Dr. Bulmaro Evans. CONSULT NOTE: 
3:53 PM Carol Rome MD spoke with Dr. Fady Phillips, Consult for [facility]. Discussed available diagnostic tests and clinical findings. CONSULT NOTE: 
4:02 PM Carol Rome MD [employer] Texted Dr. Bulmaro Evans, Consult for Infectious disease. Discussed available diagnostic tests and clinical findings. Dr. Bulmaro Evans will discuss the case w/ Dr. Nadya Ramos. CONSULT NOTE: 
4:31 PM Carol Rome MD spoke with Dr. Cheron Boeck, Consult for Ophthalmology. Discussed available diagnostic tests and clinical findings. Dr. Cheron Boeck recommends restarting Pt's Pred Forte eyedrops. The patient can see Dr. Jv Vazquez as an outpatient. CONSULT: 
Dr. Nadya Ramos - she has discussed the case with Dr. Bulmaro Evans. She and he feel the patient can be managed by PCP and no ID consult needed at this time. Hospitalist Franki for Admission 4:57 PM - dr. Johnson Burciaga 
 
ED Room Number: ER08/08 Patient Name and age:  Qamar Lilly 45 y.o.  female Working Diagnosis: 1. Lyme disease 2. Uveitis of right eye Readmission: no 
Isolation Requirements:  no 
Recommended Level of Care:  med/surg Code Status:  Full Other:  Lyme uveitits. Needs IV rocephin. Can go home with PICC once case management sorts things out. A/P: 
1. Lyme disease - start rocephin. 2. Uveitis - pre-forte

## 2019-01-24 NOTE — PROGRESS NOTES
Received consult regarding pt's need for PICC, IV Rocephin, first dose and 28 day supply for home. Spoke with ED doctor. Met with pt at bedside. Per patient, her primary care doctor's office, Dr. Manish Rai, [facility] has attempted to set up IV Rocephin since 1/14/2019. There appears to have been a delay possibly related to insurance authorization. From chart review, noted that medicine was ready to be picked up today at [facility]. MURRAY was able to speak with that pharmacist Stanley Godfrey today who did advise medication (in powder form) is ready to be picked up but concern is the dilution needed prior to administration. Notes indicate pt to be set up with home health once medication received and PICC line placed. Per pt, she \"got sick\" in PCP office today and instructed to come to ED. Pt accompanied by mom at bedside. Pt expecting admission. CM spoke with ED physician and reviewed above. Pt will likely need first dose of IV Rocephin since she's not yet received this dose and also PICC line placement while here at hospital.  Prior to now, plan was for radiology outpatient to place PICC line. CM will send referral to [facility] via [facility] and also to [facility] (owned by [facility]) for expected overnight observation admission and then discharge tomorrow with IV Rocephin. Unit CM will need to follow.  
 
NIALL Ngo

## 2019-01-24 NOTE — TELEPHONE ENCOUNTER
Dr. Missael Mackey,    Dr. Semaj Barry is calling to speak with you. After holding she hung up.  She called from ph [phone]    [facility] ED

## 2019-01-24 NOTE — ED NOTES
1649: Patient arrives for c/o of increased visual disturbances and headaches. Patient was diagnosed with lyme disease. Patient was sent here for a PICC line and antibiotics.

## 2019-01-24 NOTE — PROGRESS NOTES
Admission Medication Reconciliation: 
 
 
Removed oxycodone/apap -tx complete. Removed prednisolone ophth. Removed doxycycline. Removed sertraline. Changed cyclobenzaprine to prn, changed propranolol to prn. Ceftriaxone to be given by home health care. Therapy not started yet. Inhalers including Qvar and albuterol are used PRN Allergies reviewed - removed morphine, hydromorphone, and oxycodone. Patient received after c-sections. Trokendi ordered at [facility] - patient can use formulary susbstitute or can ask family to bring in. Significant PMH/Disease States:  
Past Medical History:  
Diagnosis Date  Arthritis  Depression  Headache  Ill-defined condition   
 scoliosis, gall stones, anemia  Seizures ([address] Chief Complaint for this Admission:  eye problem, referral 
 
Allergies: Other food; Tramadol; and [facility] Prior to Admission Medications:  
Prior to Admission Medications Prescriptions Last Dose Informant Patient Reported? Taking? QVAR REDIHALER 40 mcg/actuation HFAb inhaler   Yes No  
Sig: Take 40 mcg by inhalation two (2) times a day. albuterol (PROVENTIL VENTOLIN) 2.5 mg /3 mL (0.083 %) nebulizer solution   Yes No  
Sig: by Nebulization route every four (4) hours as needed. buPROPion SR [facility]) 100 mg SR tablet 2019 at am  Yes Yes Sig: Take 100 mg by mouth two (2) times a day. cefTRIAXone (ROCEPHIN) 1 gram injection   No Yes Si g by IntraVENous route daily for 28 days. clobetasol (TEMOVATE) 0.05 % ointment   Yes Yes  
cyclobenzaprine (FLEXERIL) 5 mg tablet   No Yes Sig: Take 1 Tab by mouth nightly. Patient taking differently: Take 5 mg by mouth nightly as needed. hydrocortisone (HYTONE) 2.5 % topical cream   No Yes Sig: Apply  to affected area two (2) times a day. lidocaine-tetracaine 7-7 % crea   Yes Yes  
nystatin (MYCOSTATIN) 100,000 unit/gram ointment   No Yes Sig: Apply  to affected area two (2) times a day. nystatin (MYCOSTATIN) powder   No Yes Sig: Apply  to affected area four (4) times daily. ondansetron (ZOFRAN ODT) 4 mg disintegrating tablet   No Yes Sig: Take 1 Tab by mouth every eight (8) hours as needed for Nausea. propranolol (INDERAL) 10 mg tablet 1/23/2019 at Unknown time  Yes Yes Sig: Take 10 mg by mouth nightly as needed. topiramate ER (TROKENDI XR) 50 mg capsule 1/23/2019 at Unknown time  No Yes Sig: Take 1 Cap by mouth nightly. triamcinolone acetonide (KENALOG) 0.147 mg/gram aero topical spray   Yes Yes Facility-Administered Medications: None Non-formulary medications Trokendi - patient can use formulary substitute or will ask family to bring in. Comments/Recommendations: Medication review done with patient. Removed oxycodone/apap -tx complete. Removed prednisolone ophth. Removed doxycycline. Removed sertraline. Changed cyclobenzaprine to prn, changed propranolol to prn. Ceftriaxone to be given by home health care. Therapy not started yet. Inhalers including Qvar and albuterol are used PRN Allergies reviewed - removed morphine, hydromorphone, and oxycodone. Patient received after c-sections.

## 2019-01-24 NOTE — TELEPHONE ENCOUNTER
JOHN Ahmadi approved. Received approval via fax from [employer]. Effective dates 01/24/19 - 01/24/20. PA number [id number]. Will scan approval into media.

## 2019-01-24 NOTE — TELEPHONE ENCOUNTER
Kelly with [facility] ( RN)  is calling for status of order for Home Health. Notes patient had a PICC line and was diagnosed with Lyme Disease.     Asking that you call with status to [phone]    Ext [phone]

## 2019-01-24 NOTE — ED NOTES
TRANSFER - OUT REPORT: 
 
Verbal report given to Maya Wade RN (name) on [employer]  being transferred to  (unit) for routine progression of care Report consisted of patients Situation, Background, Assessment and  
Recommendations(SBAR). Information from the following report(s) SBAR, Kardex, ED Summary, [facility] and Recent Results was reviewed with the receiving nurse. Lines:  
Peripheral IV 01/24/19 Left Antecubital (Active) Site Assessment Clean, dry, & intact 1/24/2019  4:21 PM  
Phlebitis Assessment 0 1/24/2019  4:21 PM  
Infiltration Assessment 0 1/24/2019  4:21 PM  
Dressing Status Clean, dry, & intact 1/24/2019  4:21 PM  
Dressing Type Tape;Transparent 1/24/2019  4:21 PM  
Hub Color/Line Status Pink;Patent; Flushed 1/24/2019  4:21 PM  
Action Taken Blood drawn 1/24/2019  4:21 PM  
Alcohol Cap Used No 1/24/2019  4:21 PM  
  
 
Opportunity for questions and clarification was provided. Patient transported with: 
Hospital transport

## 2019-01-24 NOTE — TELEPHONE ENCOUNTER
----- Message from Kaila Ross sent at 1/24/2019 10:02 AM EST -----  Regarding: Dr. Tala Echols is requesting a call back to find out what home health agency is coming to her home. Pt is always needing a refill on Nystatin ointment to be sent to [facility] (on file).   Phone: [phone]

## 2019-01-24 NOTE — TELEPHONE ENCOUNTER
[facility]--[phone]    She called to ask for practice manager or   Dr. Colonel Mckeon nurse. Said she has been trying to get the patient set up with an antibiotic before home care. She said she cannot do her job because this office cannot do their job. She said the phone system at this [facility] practice is ridiculous as she had been on hold 10 minutes already. Asked her hold, (and she said \" you mean that I have to hold again\" ) and I came back and told her management would be notified. Said she would relay all her concerns about this practice to management.

## 2019-01-25 ENCOUNTER — HOME HEALTH ADMISSION (OUTPATIENT)
Dept: HOME HEALTH SERVICES | Facility: HOME HEALTH | Age: 39
End: 2019-01-25
Payer: MEDICAID

## 2019-01-25 ENCOUNTER — TELEPHONE (OUTPATIENT)
Dept: FAMILY MEDICINE CLINIC | Age: 39
End: 2019-01-25

## 2019-01-25 LAB
ANION GAP SERPL CALC-SCNC: 7 MMOL/L (ref 5–15)
BUN SERPL-MCNC: 11 MG/DL (ref 6–20)
BUN/CREAT SERPL: 13 (ref 12–20)
CALCIUM SERPL-MCNC: 8.1 MG/DL (ref 8.5–10.1)
CHLORIDE SERPL-SCNC: 113 MMOL/L (ref 97–108)
CO2 SERPL-SCNC: 21 MMOL/L (ref 21–32)
CREAT SERPL-MCNC: 0.87 MG/DL (ref 0.55–1.02)
ERYTHROCYTE [DISTWIDTH] IN BLOOD BY AUTOMATED COUNT: 14.6 % (ref 11.5–14.5)
GLUCOSE SERPL-MCNC: 107 MG/DL (ref 65–100)
HCT VFR BLD AUTO: 34 % (ref 35–47)
HGB BLD-MCNC: 10.7 G/DL (ref 11.5–16)
MCH RBC QN AUTO: 28.6 PG (ref 26–34)
MCHC RBC AUTO-ENTMCNC: 31.5 G/DL (ref 30–36.5)
MCV RBC AUTO: 90.9 FL (ref 80–99)
NRBC # BLD: 0 K/UL (ref 0–0.01)
NRBC BLD-RTO: 0 PER 100 WBC
PLATELET # BLD AUTO: 228 K/UL (ref 150–400)
PMV BLD AUTO: 9.9 FL (ref 8.9–12.9)
POTASSIUM SERPL-SCNC: 3.7 MMOL/L (ref 3.5–5.1)
RBC # BLD AUTO: 3.74 M/UL (ref 3.8–5.2)
SODIUM SERPL-SCNC: 141 MMOL/L (ref 136–145)
WBC # BLD AUTO: 6.6 K/UL (ref 3.6–11)

## 2019-01-25 PROCEDURE — 80048 BASIC METABOLIC PNL TOTAL CA: CPT

## 2019-01-25 PROCEDURE — C1751 CATH, INF, PER/CENT/MIDLINE: HCPCS

## 2019-01-25 PROCEDURE — 77030018719 HC DRSG PTCH ANTIMIC J&J -A

## 2019-01-25 PROCEDURE — 36415 COLL VENOUS BLD VENIPUNCTURE: CPT

## 2019-01-25 PROCEDURE — 02HV33Z INSERTION OF INFUSION DEVICE INTO SUPERIOR VENA CAVA, PERCUTANEOUS APPROACH: ICD-10-PCS

## 2019-01-25 PROCEDURE — 74011250636 HC RX REV CODE- 250/636: Performed by: INTERNAL MEDICINE

## 2019-01-25 PROCEDURE — 36569 INSJ PICC 5 YR+ W/O IMAGING: CPT | Performed by: INTERNAL MEDICINE

## 2019-01-25 PROCEDURE — 65270000032 HC RM SEMIPRIVATE

## 2019-01-25 PROCEDURE — 76937 US GUIDE VASCULAR ACCESS: CPT

## 2019-01-25 PROCEDURE — 74011250637 HC RX REV CODE- 250/637: Performed by: INTERNAL MEDICINE

## 2019-01-25 PROCEDURE — 77030020365 HC SOL INJ SOD CL 0.9% 50ML

## 2019-01-25 PROCEDURE — 74011250637 HC RX REV CODE- 250/637: Performed by: FAMILY MEDICINE

## 2019-01-25 PROCEDURE — 85027 COMPLETE CBC AUTOMATED: CPT

## 2019-01-25 PROCEDURE — 94640 AIRWAY INHALATION TREATMENT: CPT

## 2019-01-25 PROCEDURE — 74011000250 HC RX REV CODE- 250: Performed by: INTERNAL MEDICINE

## 2019-01-25 RX ORDER — FLUCONAZOLE 2 MG/ML
200 INJECTION, SOLUTION INTRAVENOUS EVERY 24 HOURS
Status: DISCONTINUED | OUTPATIENT
Start: 2019-01-25 | End: 2019-01-28 | Stop reason: HOSPADM

## 2019-01-25 RX ORDER — NYSTATIN 100000 U/G
OINTMENT TOPICAL 2 TIMES DAILY
Status: DISCONTINUED | OUTPATIENT
Start: 2019-01-25 | End: 2019-01-28 | Stop reason: HOSPADM

## 2019-01-25 RX ORDER — SODIUM CHLORIDE 0.9 % (FLUSH) 0.9 %
10 SYRINGE (ML) INJECTION EVERY 8 HOURS
Status: DISCONTINUED | OUTPATIENT
Start: 2019-01-25 | End: 2019-01-28 | Stop reason: HOSPADM

## 2019-01-25 RX ORDER — SODIUM CHLORIDE 0.9 % (FLUSH) 0.9 %
10 SYRINGE (ML) INJECTION AS NEEDED
Status: DISCONTINUED | OUTPATIENT
Start: 2019-01-25 | End: 2019-01-28 | Stop reason: HOSPADM

## 2019-01-25 RX ORDER — SODIUM CHLORIDE 0.9 % (FLUSH) 0.9 %
10 SYRINGE (ML) INJECTION EVERY 24 HOURS
Status: DISCONTINUED | OUTPATIENT
Start: 2019-01-25 | End: 2019-01-28 | Stop reason: HOSPADM

## 2019-01-25 RX ORDER — CLOBETASOL PROPIONATE 0.5 MG/G
OINTMENT TOPICAL 2 TIMES DAILY
Status: DISCONTINUED | OUTPATIENT
Start: 2019-01-25 | End: 2019-01-28 | Stop reason: HOSPADM

## 2019-01-25 RX ORDER — SODIUM CHLORIDE 0.9 % (FLUSH) 0.9 %
20 SYRINGE (ML) INJECTION AS NEEDED
Status: DISCONTINUED | OUTPATIENT
Start: 2019-01-25 | End: 2019-01-28 | Stop reason: HOSPADM

## 2019-01-25 RX ADMIN — PREDNISOLONE SODIUM PHOSPHATE 1 DROP: 10 SOLUTION/ DROPS OPHTHALMIC at 12:51

## 2019-01-25 RX ADMIN — Medication 10 ML: at 09:03

## 2019-01-25 RX ADMIN — Medication 10 ML: at 12:49

## 2019-01-25 RX ADMIN — NYSTATIN: 100000 OINTMENT TOPICAL at 09:04

## 2019-01-25 RX ADMIN — Medication 10 ML: at 12:50

## 2019-01-25 RX ADMIN — PREDNISOLONE SODIUM PHOSPHATE 1 DROP: 10 SOLUTION/ DROPS OPHTHALMIC at 09:05

## 2019-01-25 RX ADMIN — Medication 10 ML: at 19:02

## 2019-01-25 RX ADMIN — ONDANSETRON 4 MG: 4 TABLET, ORALLY DISINTEGRATING ORAL at 09:18

## 2019-01-25 RX ADMIN — PREDNISOLONE SODIUM PHOSPHATE 1 DROP: 10 SOLUTION/ DROPS OPHTHALMIC at 19:03

## 2019-01-25 RX ADMIN — FLUCONAZOLE, SODIUM CHLORIDE 200 MG: 2 INJECTION INTRAVENOUS at 12:49

## 2019-01-25 RX ADMIN — TOPIRAMATE 25 MG: 25 TABLET, FILM COATED ORAL at 09:03

## 2019-01-25 RX ADMIN — OXYCODONE AND ACETAMINOPHEN 2 TABLET: 5; 325 TABLET ORAL at 09:18

## 2019-01-25 RX ADMIN — NYSTATIN: 100000 OINTMENT TOPICAL at 19:03

## 2019-01-25 RX ADMIN — CLOBETASOL PROPIONATE: 0.5 OINTMENT TOPICAL at 09:04

## 2019-01-25 RX ADMIN — TOPIRAMATE 25 MG: 25 TABLET, FILM COATED ORAL at 21:10

## 2019-01-25 RX ADMIN — Medication 10 ML: at 21:14

## 2019-01-25 RX ADMIN — HEPARIN SODIUM 5000 UNITS: 5000 INJECTION, SOLUTION INTRAVENOUS; SUBCUTANEOUS at 12:49

## 2019-01-25 RX ADMIN — LORAZEPAM 0.5 MG: 0.5 TABLET ORAL at 00:07

## 2019-01-25 RX ADMIN — PROPRANOLOL HYDROCHLORIDE 10 MG: 10 TABLET ORAL at 21:10

## 2019-01-25 RX ADMIN — SODIUM CHLORIDE 1000 ML: 900 INJECTION, SOLUTION INTRAVENOUS at 00:39

## 2019-01-25 RX ADMIN — HEPARIN SODIUM 5000 UNITS: 5000 INJECTION, SOLUTION INTRAVENOUS; SUBCUTANEOUS at 19:04

## 2019-01-25 RX ADMIN — PREDNISOLONE SODIUM PHOSPHATE 1 DROP: 10 SOLUTION/ DROPS OPHTHALMIC at 21:14

## 2019-01-25 RX ADMIN — CLOBETASOL PROPIONATE: 0.5 OINTMENT TOPICAL at 19:04

## 2019-01-25 NOTE — PROGRESS NOTES
Hospitalist Progress Note Anuja Monterroso MD 
Answering service: [phone] OR [phone] from in house phone C Date of Service:  2019 NAME:  Francesca France :  1980 MRN:  [id number] Admission Summary:  
Pt admitted to [facility] rx for lyme uveitis, has rt facial paion, headache, n/v Interval history / Subjective:  
   
2019 Per pt, the abx are approved and at [facility] pharmacy in powder form which is problematic for dosing at home Pt to call pcp to see if he has answer for administration of meds, else pt now with PICC for dosing 2 gm rocephin iv x 28 days   . I D consulted to write abx orders as [facility] delivery and education on home admin of meds,   Otherwise   the pcp may be able to arrange through the infusion center but this is not clear. The presumptive dx of lyme uveitis is a combined dx from both opthalmonogist by verbal report and  and pcp and pt with +  Titers After noting all of the above, seems best that pt have formal ID consult and disposiotn pending full eval , pt to remain in hospital till dx and treatment is worked out , have discussed case with  Dr. Gudelia Najera who kindly agreed to see pt in consultation. Assessment & Plan:  
 
Lyme uveitis presumptive dx for rocephin and close f/u with ophthalmology on discharge and pred forte Yeast vagintis. ingunal candida for iv diflucan as pt has had difficulty treating effectively w po trial in  Past, may need alternative rx , for now iv diflucan. Obesity Body mass index is 36.94 kg/m². Code status: full DVT prophylaxis: Heparin Care Plan discussed with: Patient/Family, Nurse and  Disposition: Home w/Family and TBD Hospital Problems  Date Reviewed: 2019 Codes Class Noted POA Uveitis ICD-10-CM: H20.9 ICD-9-CM: 364.3  2019 Unknown Review of Systems: A comprehensive review of systems was negative except for that written in the HPI. N/v improved pt ate breakfast.  
 
 
Vital Signs:  
 Last 24hrs VS reviewed since prior progress note. Most recent are: 
Visit Vitals /69 (BP 1 Location: Right arm, BP Patient Position: At rest) Pulse 67 Temp 98.5 °F (36.9 °C) Resp 16 Wt 103.8 kg (228 lb 13.4 oz) SpO2 98% Breastfeeding? No  
BMI 36.94 kg/m² Intake/Output Summary (Last 24 hours) at 1/25/2019 1106 Last data filed at 1/24/2019 5690 Gross per 24 hour Intake 480 ml Output  Net 480 ml Physical Examination:  
 
 
     
Constitutional:  No acute distress, cooperative, pleasant   
ENT:  Oral mucous moist, oropharynx benign. Neck supple, Resp:  CTA bilaterally. No wheezing/rhonchi/rales. No accessory muscle use CV:  Regular rhythm, normal rate, no murmurs, gallops, rubs GI:  Soft, non distended, non tender. normoactive bowel sounds, no hepatosplenomegaly Musculoskeletal:  No edema, warm, 2+ pulses throughout Neurologic:  Moves all extremities. AAOx3, CN II-XII reviewed Psych:  Good insight, Not anxious nor agitated. Skin:  inguinal candidiasis Data Review:  
 Review and/or order of clinical lab test 
 
 
Labs:  
 
Recent Labs  
  01/25/19 
0019 01/24/19 
1721 WBC 6.6 7.4 HGB 10.7* 11.9 HCT 34.0* 39.2  178 Recent Labs  
  01/25/19 
0019 01/24/19 
1721  142  
K 3.7 3.9 * 114* CO2 21 22 BUN 11 11 CREA 0.87 0.83 * 86  
CA 8.1* 8.0* Recent Labs  
  01/24/19 
1721 SGOT 14* ALT 27 AP 72 TBILI 0.2 TP 7.1 ALB 3.1*  
GLOB 4.0 No results for input(s): INR, PTP, APTT in the last 72 hours. No lab exists for component: INREXT No results for input(s): FE, TIBC, PSAT, FERR in the last 72 hours. No results found for: FOL, RBCF No results for input(s): PH, PCO2, PO2 in the last 72 hours. No results for input(s): CPK, CKNDX, TROIQ in the last 72 hours. No lab exists for component: CPKMB No results found for: CHOL, CHOLX, CHLST, CHOLV, HDL, LDL, LDLC, DLDLP, TGLX, TRIGL, TRIGP, CHHD, CHHDX Lab Results Component Value Date/Time Glucose (POC) 101 (H) 07/23/2015 09:30 PM  
 Glucose (POC) 104 07/10/2014 11:43 AM  
 
Lab Results Component Value Date/Time Color YELLOW/STRAW 10/16/2016 05:34 PM  
 Appearance CLEAR 10/16/2016 05:34 PM  
 Specific gravity 1.022 10/16/2016 05:34 PM  
 Specific gravity >1.030 (H) 07/23/2015 10:05 PM  
 pH (UA) 6.0 10/16/2016 05:34 PM  
 Protein NEGATIVE  10/16/2016 05:34 PM  
 Glucose NEGATIVE  10/16/2016 05:34 PM  
 Ketone NEGATIVE  10/16/2016 05:34 PM  
 Bilirubin NEGATIVE  10/16/2016 05:34 PM  
 Urobilinogen 0.2 10/16/2016 05:34 PM  
 Nitrites NEGATIVE  10/16/2016 05:34 PM  
 Leukocyte Esterase NEGATIVE  10/16/2016 05:34 PM  
 Epithelial cells FEW 10/16/2016 05:34 PM  
 Bacteria NEGATIVE  10/16/2016 05:34 PM  
 WBC 0-4 10/16/2016 05:34 PM  
 RBC 10-20 10/16/2016 05:34 PM  
 
 
 
Medications Reviewed:  
 
Current Facility-Administered Medications Medication Dose Route Frequency  clobetasol (TEMOVATE) 0.05 % ointment   Topical BID  nystatin (MYCOSTATIN) 100,000 unit/gram ointment   Topical BID  sodium chloride (NS) flush 20 mL  20 mL InterCATHeter PRN  
 sodium chloride (NS) flush 10 mL  10 mL InterCATHeter Q24H  
 sodium chloride (NS) flush 10 mL  10 mL InterCATHeter PRN  
 sodium chloride (NS) flush 10 mL  10 mL InterCATHeter Q8H  
 alteplase (CATHFLO) 1 mg in sterile water (preservative free) 1 mL injection  1 mg InterCATHeter PRN  
 fluconazole (DIFLUCAN) 200mg/100 mL IVPB (premix)  200 mg IntraVENous Q24H  
 oxyCODONE-acetaminophen (PERCOCET) 5-325 mg per tablet 1-2 Tab  1-2 Tab Oral Q4H PRN  
 0.9% sodium chloride infusion 1,000 mL  1,000 mL IntraVENous CONTINUOUS  
 sodium chloride (NS) flush 5-40 mL  5-40 mL IntraVENous Q8H  
  sodium chloride (NS) flush 5-40 mL  5-40 mL IntraVENous PRN  
 LORazepam (ATIVAN) tablet 0.5 mg  0.5 mg Oral BID PRN  
 heparin (porcine) injection 5,000 Units  5,000 Units SubCUTAneous Q8H  
 albuterol (PROVENTIL VENTOLIN) nebulizer solution 2.5 mg  2.5 mg Nebulization Q6H PRN  
 cyclobenzaprine (FLEXERIL) tablet 5 mg  5 mg Oral QHS  ondansetron (ZOFRAN ODT) tablet 4 mg  4 mg Oral Q8H PRN  propranolol (INDERAL) tablet 10 mg  10 mg Oral QHS  budesonide (PULMICORT) 500 mcg/2 ml nebulizer suspension  500 mcg Nebulization BID RT  
 prednisoLONE sodium phosphate (INFLAMASE FORTE) 1 % ophthalmic solution 1 Drop  1 Drop Right Eye QID  ondansetron (ZOFRAN) injection 4 mg  4 mg IntraVENous Q4H PRN  
 cefTRIAXone (ROCEPHIN) 2 g in 0.9% sodium chloride (MBP/ADV) 50 mL  2 g IntraVENous Q24H  
 topiramate (TOPAMAX) tablet 25 mg  25 mg Oral Q12H  
 
______________________________________________________________________ EXPECTED LENGTH OF STAY: 2d 16h ACTUAL LENGTH OF STAY:          1 Arin Douglas MD

## 2019-01-25 NOTE — PROGRESS NOTES
Reason for Admission:   Uveitis and Lyme disease RRAT Score:     7 Plan for utilizing home health:   TBD Likelihood of Readmission:  low Transition of Care Plan:    Pt is independent with ADLs and IADLs. CM met with pt to discuss planning. Pt stated that her PCP and Optometrist have been trying to set her up with IV ABx for a while, but have been unsuccessful? Patient said that they ordered Abx medication for her to  at [facility], but it is in vials? Pt got her PICC line placed, but need IV ABX orders. CM waiting on IV Abx orders and can arragne them. Initial referral were sent by ED case manager to [facility] for home infusion and to [address] [place] [address]. Per Linda, pt is covered 100% for home infusion, they would just need orders and they can provide medication and teaching. [address] [place] [address] has also accepted pt for home care services, but will need home health orders. CM discussed IV Abx at the infusion center, pt stated that if she had to go for 28 days that she would rather do home infusions instead of going to the infusion center everyday. Cm waiting for home health and home IV Abx orders. Care Management Interventions PCP Verified by CM: Yes Mode of Transport at Discharge: Other (see comment)(private vehicle) Discharge Durable Medical Equipment: No 
Physical Therapy Consult: No 
Occupational Therapy Consult: No 
Speech Therapy Consult: No 
Current Support Network: (independent with ADLs) Confirm Follow Up Transport: Self Plan discussed with Pt/Family/Caregiver: Yes Freedom of Choice Offered: Yes [employer] Information Provided?: No 
Discharge Location Discharge Placement: Home with infusion therapy

## 2019-01-25 NOTE — TELEPHONE ENCOUNTER
Patient calling from [facility],  patient's phone [phone]        Patient is asking that Dr. Rachel Quintana call her to discuss her condition.     thanks

## 2019-01-25 NOTE — CONSULTS
ID consult     NAME:  Jess Mercado                      :   1980                       MRN:   [id number]   Date/Time:  2019 3:35 PM  Subjective:   REASON FOR CONSULT:  Lyme uveitis and for IV antibiotic      Jess Mercado  is a 45 y.o. 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 As per patient she started developing blurred vision of the right eye mid last year and saw Dr. Jagdeep Pierson, an ophthalmologist for the first time on 2018. During that visit she had mentioned about pain along with blurring and discharge of the right eye which was going on for nearly a week and also complained of pain with eye movement. I spoke to Dr. Jagdeep Pierson and got her records. Her notes mention during that visit the optic disc had no edema was good color with no vascularization. The vitreous was clear. The macula was normal with no edema the cup-to-disc size was 0.8, ,the external eye there was blepharitis, 1+ cell was found in the iris. 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 She wanted her to go to the emergency room and get neurology consult and also get an MRI. Patient had an MRI done on 2018 and that showed a few nonspecific left parietal white matter T2 hyperintensities. The orbits were normal appearing. Patient at that time was being followed by her PCP at [facility] and she  switched to family medicine at [facility] in October.   She went back to Dr. Jagdeep Pierson on 2018 and she asked her to continue with the prednisone forte drops for her eye and and she asked for the following test to be done to rule out the reason for uveitis including ESR CRP CANDIDA, rheumatoid factor, Lyme titer, ACE level, syphilis serology and HIV antibody testing. Patient saw a neurologist on October 8, 2018 for severe headache on the right frontal and temporal area and also right periorbital pain shooting to the back of the head with blurred vision. He wondered whether the patient had migraine and wanted to give her the Chema protocol and also prescribed Topamax and was considering a lumbar puncture to check for opening pressure if there was no improvement with the Chema protocol. The patient refused lumbar puncture because of scoliosis. Meanwhile she saw a new PCP Dr. Raúl Valverde of [facility] and she did the autoimmune workup in October 2018 and sent Lyme serology in December 2018. The Lyme test total antibody came back as 1.02 and the best and last really revealed 6 IgG bands. After discussing with Dr. Soledad Restrepo at [place] [address] she started the patient on doxycycline on 26 December and patient took doxycycline for nearly 9 days and stopped it because of dizziness and GI symptoms. Her PCP then approached Dr. Soledad Restrepo for an alternate treatment and IV ceftriaxone was recommended. There was some difficulty in arranging IV ceftriaxone. Meanwhile patient received 2 courses of the Chema protocol for the severe headaches. The first course that was given last year did not really make any difference to the headache and the second course was taken on the 8th, ninth and 10 January 2019 and that made some difference to the headache. But a few days ago patient started to have more blurring of her vision , some numbness of the right side of the face along with nausea and vomiting and she called her PCP who asked her to go to the emergency department at [facility].  So patient came to [facility] yesterday. Patient has a constellation of the  symptoms.   Blurring of the right eye with impaired vision, headache on the right side of the face , numbness and pinpricks on the right side of the face, weakness on the right hand and at times dropping things, pins and needles on her feet, absent bladder filling sensation  leading to incontinence  if she does not go to the bathroom on a timed basis. For the latter symptoms she has seen urologist many years ago and it was thought that because of 5 children and 4   this could be a consequence of that. Patient does not have any joint swelling, fever, weight loss, cough, shortness of breath, palpitations. 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  2 years ago when she was at that place she had noted a rash on the left side of her face and neck and a physician at the group home had given her 5 days of doxycycline and she responded to the antibiotic. She does not remember any tick bite. Patient has 5 children but all of them are staying with her mother as her house has  pest infestation. She states in her attic there is raccoon and possum and she has also noted  rats in her house. She has been in this house which is a section 8 housing since 2018. She has a history of attempted suicide and was at [facility] mental health for nearly 2 months. Past Medical History:   Diagnosis Date    Arthritis     Depression     Headache     Ill-defined condition     scoliosis, gall stones, anemia    Seizures ([address]       Past Surgical History:   Procedure Laterality Date    HX  SECTION      x 4    HX HERNIA REPAIR      HX TUBAL LIGATION          Social history  Lives with her boyfriend  Non-smoker, no alcohol or illicit drug use. Has 5 children. One son is autistic ,1 has borderline autism    Family history  Mother has rheumatoid arthritis.       Allergies   Allergen Reactions    Other Food Swelling     All nuts except peanuts    Tramadol Nausea and Vomiting    Nut - Unspecified Swelling    Leslie Swelling     Swollen lips     Medication prior to admission  Wellbutrin  Topamax XR  Cyclobenzaprine    Pred forte eyedrops  Propanolol         REVIEW OF SYSTEMS:     Const: negative fever, negative chills, negative weight loss  Eyes:  Blurred vision right eye, pain on movement of right eye  ENT:  negative coryza, negative sore throat  Resp:  negative cough, hemoptysis, dyspnea  Cards: negative for chest pain, palpitations, lower extremity edema  : Absent bladder sensation, incontinence   GI: Negative for abdominal pain, diarrhea, bleeding, constipation  Skin:  negative for rash and pruritus  Heme: negative for easy bruising and gum/nose bleeding  MS: Myalgia, arthralgia, fatigue   Neurolo: headaches, dizziness, numbness right side of the face  Psych:anxiety, depression   Endocrine: n no polyuria or polydipsia  Allergy/Immunology-allergic to nuts   Objective:   VITALS:    Visit Vitals  /73 (BP 1 Location: Left arm, BP Patient Position: At rest)   Pulse 64   Temp 98.8 °F (37.1 °C)   Resp 16   Wt 228 lb 13.4 oz (103.8 kg)   LMP 01/05/2019 (Exact Date)   SpO2 98%   Breastfeeding? No   BMI 36.94 kg/m²       PHYSICAL EXAM:   General:    Alert, cooperative, no distress, appears stated age. Head:   Normocephalic, without obvious abnormality, atraumatic. Eyes:   Conjunctivae clear, anicteric sclerae. Pupils are equal  ENT  Nares normal. No drainage or sinus tenderness. Lips, mucosa, and tongue normal.  No Thrush  Neck:  Supple, symmetrical,  no adenopathy, thyroid: non tender    no carotid bruit and no JVD. Back:    No CVA tenderness. Lungs:   Clear to auscultation bilaterally. No Wheezing or Rhonchi. No rales. Heart:   Regular rate and rhythm,  no murmur, rub or gallop. Abdomen:   Soft, non-tender,not distended. Bowel sounds normal. No masses  Extremities: Right PICC, atraumatic, no cyanosis. No edema. No clubbing  Skin:     No rashes or lesions. Or bruising  Lymph: Cervical, supraclavicular normal.  Neurologic: Grossly non-focal    Pertinent Labs  12/13/2018    Antibody IgG/IgM 1.02  Lyme antibody Western blot IgG 5 bands  T. pallidum negative  CRP 2.93  ACE level 47  CANDIDA negative  HIV negative  WBC 6.6  Hemoglobin 10.7  Platelet 594  CR 3.00  ALT 27  AST 14    IMAGING RESULTS:  MRI of the brain from September 2018 reviewed personally with radiologist nonspecific left parietal white matter T2 hyperintensities noted    Impression/Recommendation  45 y.o. 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 Patient has constellation of symptoms which includes headache, numbness on the right side of the face, weakness in her right hand with no objective findings on clinical examination. All the symptoms could be explained to being due to migraine. Blurring of the right eye with clinical findings of anterior uveitis and  Ischemic optic  neuritis cannot be explained by migraine. All the etiology for anterior uveitis like syphilis, sarcoid, lupus, rheumatoid arthritis is negative by blood work. The other differential as per ophthalmologist Dr. Gerardo Mendez is Lyme disease causing uveitis. The Lyme antibody titer is very minimally elevated and could very well be explained as false positive but in 2016 patient had taken a few days of doxycycline for a rash on the left side of her neck. So this could be partially treated Lyme disease. Ischemic optic neuritis is concerning for multiple sclerosis and hence she will need a lumbar puncture and CSF examination to look for both CNS Lyme and for multiple sclerosis. Once lumbar puncture is done will then start IV ceftriaxone.   CSF to be sent for the following tests  1)Need to measure opening pressure  2 cell count  3 protein and glucose  4) save 10 cc of CSF for further testing which will include Lyme index, multiple sclerosis workup. few punctate white matter foci of T2 hyperintensity in the left  parietal lobe from Sept 2018  ? repeat MRI with contrast  Recommend neurology consult while in the hospital.      ? Neurogenic bladder ?due to previous surgery ? MS  Recommend pre-and post void bladder scan      Anxiety and depression    History of gestational hypertension. ___________________________________________________    Discussed with patient, requesting provider  Case discussed with Dr. Isak Julian.   Reviewed MRI with radiologist.

## 2019-01-25 NOTE — H&P
H and P  
 
CC:   Facial /eye pain. Rt  
 
HPI: pt w presumptive lyme uveitis set up for 28 days iv rocephin not able to start 2n2 no iv set up, has the insurance agreeement on supplying the medicine, needs PICC,  opthal consulted and verbalized to cont on pred forte eye drops to rt eye til seen in out pt opthal clinic. Pertinent negatives: no cp no sob, no fever ,chills Pertinent positives: facial pain rt, mild nausea,  
Associations of importance: h/o lyme dz positive titers. Tobacco neg Alcohol  no Lives with independent Prior to Admission Med List 
Prior to Admission Medications Prescriptions Last Dose Informant Patient Reported? Taking? QVAR REDIHALER 40 mcg/actuation HFAb inhaler   Yes No  
Sig: Take 40 mcg by inhalation two (2) times a day. albuterol (PROVENTIL VENTOLIN) 2.5 mg /3 mL (0.083 %) nebulizer solution   Yes No  
Sig: by Nebulization route every four (4) hours as needed. buPROPion SR [facility]) 100 mg SR tablet 2019 at am  Yes Yes Sig: Take 100 mg by mouth two (2) times a day. cefTRIAXone (ROCEPHIN) 1 gram injection   No Yes Si g by IntraVENous route daily for 28 days. clobetasol (TEMOVATE) 0.05 % ointment 2019 at Unknown time  Yes Yes  
cyclobenzaprine (FLEXERIL) 5 mg tablet   No Yes Sig: Take 1 Tab by mouth nightly. Patient taking differently: Take 5 mg by mouth nightly as needed. hydrocortisone (HYTONE) 2.5 % topical cream   No Yes Sig: Apply  to affected area two (2) times a day. lidocaine-tetracaine 7-7 % crea 2019 at Unknown time  Yes Yes  
nystatin (MYCOSTATIN) 100,000 unit/gram ointment 2019 at Unknown time  No Yes Sig: Apply  to affected area two (2) times a day. nystatin (MYCOSTATIN) powder 2019 at Unknown time  No Yes Sig: Apply  to affected area four (4) times daily. ondansetron (ZOFRAN ODT) 4 mg disintegrating tablet   No Yes Sig: Take 1 Tab by mouth every eight (8) hours as needed for Nausea. propranolol (INDERAL) 10 mg tablet 1/23/2019 at Unknown time  Yes Yes Sig: Take 10 mg by mouth nightly as needed. topiramate ER (TROKENDI XR) 50 mg capsule 1/23/2019 at Unknown time  No Yes Sig: Take 1 Cap by mouth nightly. triamcinolone acetonide (KENALOG) 0.147 mg/gram aero topical spray   Yes No  
  
Facility-Administered Medications: None SH: Social History Socioeconomic History  Marital status: SINGLE Spouse name: Not on file  Number of children: Not on file  Years of education: Not on file  Highest education level: Not on file Social Needs  Financial resource strain: Not on file  Food insecurity - worry: Not on file  Food insecurity - inability: Not on file  Transportation needs - medical: Not on file  Transportation needs - non-medical: Not on file Occupational History  Not on file Tobacco Use  Smoking status: Never Smoker  Smokeless tobacco: Never Used Substance and Sexual Activity  Alcohol use: No  
 Drug use: No  
 Sexual activity: Not Currently Other Topics Concern  Not on file Social History Narrative  Not on file PMH:  
 
Past Medical History:  
Diagnosis Date  Arthritis  Depression  Headache  Ill-defined condition   
 scoliosis, gall stones, anemia  Seizures ([facility] [address] Medicine PTA:  
 
Medications Prior to Admission Medication Sig  cefTRIAXone (ROCEPHIN) 1 gram injection 2 g by IntraVENous route daily for 28 days.  clobetasol (TEMOVATE) 0.05 % ointment  lidocaine-tetracaine 7-7 % crea  cyclobenzaprine (FLEXERIL) 5 mg tablet Take 1 Tab by mouth nightly. (Patient taking differently: Take 5 mg by mouth nightly as needed.)  nystatin (MYCOSTATIN) 100,000 unit/gram ointment Apply  to affected area two (2) times a day.  nystatin (MYCOSTATIN) powder Apply  to affected area four (4) times daily.  topiramate ER (TROKENDI XR) 50 mg capsule Take 1 Cap by mouth nightly.  buPROPion SR (WELLBUTRIN SR) 100 mg SR tablet Take 100 mg by mouth two (2) times a day.  propranolol (INDERAL) 10 mg tablet Take 10 mg by mouth nightly as needed.  hydrocortisone (HYTONE) 2.5 % topical cream Apply  to affected area two (2) times a day.  ondansetron (ZOFRAN ODT) 4 mg disintegrating tablet Take 1 Tab by mouth every eight (8) hours as needed for Nausea.  triamcinolone acetonide (KENALOG) 0.147 mg/gram aero topical spray   
 albuterol (PROVENTIL VENTOLIN) 2.5 mg /3 mL (0.083 %) nebulizer solution by Nebulization route every four (4) hours as needed.  QVAR REDIHALER 40 mcg/actuation HFAb inhaler Take 40 mcg by inhalation two (2) times a day. ROS:  
Review of Systems Constitutional: Positive for malaise/fatigue. Negative for diaphoresis. HENT: Negative. Eyes:  
     Rt eye pain Respiratory: Negative. Cardiovascular: Negative. Gastrointestinal: Negative. Genitourinary: Negative. Musculoskeletal: Negative. Skin: Negative. Neurological: Negative. Negative for weakness. Endo/Heme/Allergies: Negative. Psychiatric/Behavioral: Negative. PE:  
 
Visit Vitals /69 (BP 1 Location: Right arm, BP Patient Position: At rest) Pulse 67 Temp 98.5 °F (36.9 °C) Resp 16 Wt 103.8 kg (228 lb 13.4 oz) LMP 01/05/2019 (Exact Date) SpO2 98% Breastfeeding? No  
BMI 36.94 kg/m² Physical Exam  
Constitutional: She is oriented to person, place, and time and well-developed, well-nourished, and in no distress. HENT:  
Head: Normocephalic and atraumatic. Eyes: Conjunctivae and EOM are normal. Pupils are equal, round, and reactive to light. Neck: Normal range of motion. Neck supple. Cardiovascular: Normal rate and regular rhythm. Pulmonary/Chest: Effort normal and breath sounds normal.  
Abdominal: Soft. Neurological: She is alert and oriented to person, place, and time. Skin: Skin is warm and dry. Psychiatric: Mood, memory and affect normal.  
  
 
 
Data:  
Lab Results Component Value Date/Time WBC 6.6 01/25/2019 12:19 AM  
 Hemoglobin (POC) 11.6 07/10/2014 11:43 AM  
 HGB 10.7 (L) 01/25/2019 12:19 AM  
 Hematocrit (POC) 34 (L) 07/10/2014 11:43 AM  
 HCT 34.0 (L) 01/25/2019 12:19 AM  
 PLATELET 203 33/78/9378 12:19 AM  
 MCV 90.9 01/25/2019 12:19 AM  
  
 
Lab Results Component Value Date/Time Sodium 141 01/25/2019 12:19 AM  
 Potassium 3.7 01/25/2019 12:19 AM  
 Chloride 113 (H) 01/25/2019 12:19 AM  
 CO2 21 01/25/2019 12:19 AM  
 Anion gap 7 01/25/2019 12:19 AM  
 Glucose 107 (H) 01/25/2019 12:19 AM  
 BUN 11 01/25/2019 12:19 AM  
 Creatinine 0.87 01/25/2019 12:19 AM  
 BUN/Creatinine ratio 13 01/25/2019 12:19 AM  
 GFR est AA >60 01/25/2019 12:19 AM  
 GFR est non-AA >60 01/25/2019 12:19 AM  
 Calcium 8.1 (L) 01/25/2019 12:19 AM  
 Bilirubin, total 0.2 01/24/2019 05:21 PM  
 AST (SGOT) 14 (L) 01/24/2019 05:21 PM  
 Alk. phosphatase 72 01/24/2019 05:21 PM  
 Protein, total 7.1 01/24/2019 05:21 PM  
 Albumin 3.1 (L) 01/24/2019 05:21 PM  
 Globulin 4.0 01/24/2019 05:21 PM  
 A-G Ratio 0.8 (L) 01/24/2019 05:21 PM  
 ALT (SGPT) 27 01/24/2019 05:21 PM  
  
 
EKG Results None XR Results (most recent): 
Results from Appointment encounter on 12/13/18 XR SPINE LUMB 2 OR 3 V Narrative INDICATION:  Chronic back pain Exam: AP, lateral and cone-down views of the lumbar spine. FINDINGS: There is no acute fracture or subluxation. Intervertebral disc spaces 
are well maintained. Bones are well-mineralized. Soft tissues are normal. There 
is a levoconvex rotatory scoliosis centered in the mid lumbar spine. Impression IMPRESSION: No acute fracture or subluxation. Assessment/Plan:  
 
· Uveitis, likley lyme assoc for 28 days rocephin iv,  
· Intol to doxy results in need for rocephin Advance directives: Rose Strong MD 1/25/2019

## 2019-01-25 NOTE — PROCEDURES
PICC Placement Note. Order received. PICC procedure, risks, benefits, complications reviewed with the patient. Questions answered to satisfaction. Jess Mercado  signed informed consent for bedside placement of PICC line. PRE-PROCEDURE VERIFICATION  Correct Procedure: yes  Correct Site:  yes  Temperature: Temp: 98.5 °F (36.9 °C), Temperature Source: Temp Source: Oral  Recent Labs     01/25/19  0019   BUN 11   CREA 0.87      WBC 6.6     Allergies: Other food; Tramadol; Nut - unspecified; and [employer], including PICC Booklet, for PICC Care given to patient: yes. See Patient Education activity for further details. PROCEDURE DETAIL  A single lumen PICC line was started for antibiotic therapy, desire for reliable access and nurse/physician request. The following documentation is in addition to the PICC properties in the lines/airways flowsheet : The vein was accessed with a 20g IV catheter using ultrasound guidance and a guidewire was easily thread. Modified Seldinger Technique was used to thread the PICC and the tip direction was determined with a PICC tip  device  Lot #: [id number]  Was xylocaine 1% used intradermally:  yes  Catheter Length: 37 (cm)  Vein Selection for PICC:right basilic  Central Line Bundle followed yes  Complication Related to Insertion: none    The placement was verified by ECG/Sapiens technology:  Waveform placed on the patient's chart to document that the PICC tip is in the  superior vena cava. Report given to TANYA Ortiz  PICC is functioning and ready form immediate use.

## 2019-01-26 ENCOUNTER — APPOINTMENT (OUTPATIENT)
Dept: GENERAL RADIOLOGY | Age: 39
DRG: 724 | End: 2019-01-26
Attending: INTERNAL MEDICINE
Payer: MEDICAID

## 2019-01-26 LAB
APPEARANCE CSF: CLEAR
COLOR CSF: COLORLESS
COMMENT, HOLDF: NORMAL
GLUCOSE CSF-MCNC: 55 MG/DL (ref 40–70)
PROT CSF-MCNC: 38 MG/DL (ref 15–45)
RBC # CSF: 6 /CU MM
SAMPLES BEING HELD,HOLD: NORMAL
TUBE # CSF: 1
TUBE # CSF: 1
TUBE # CSF: 3
WBC # CSF: 0 /CU MM (ref 0–5)

## 2019-01-26 PROCEDURE — 65270000032 HC RM SEMIPRIVATE

## 2019-01-26 PROCEDURE — 77030014143 XR SPINAL PUNC LUMB DX

## 2019-01-26 PROCEDURE — 74011250637 HC RX REV CODE- 250/637: Performed by: INTERNAL MEDICINE

## 2019-01-26 PROCEDURE — 89050 BODY FLUID CELL COUNT: CPT

## 2019-01-26 PROCEDURE — 74011250636 HC RX REV CODE- 250/636: Performed by: INTERNAL MEDICINE

## 2019-01-26 PROCEDURE — 74011250636 HC RX REV CODE- 250/636

## 2019-01-26 PROCEDURE — 36415 COLL VENOUS BLD VENIPUNCTURE: CPT

## 2019-01-26 PROCEDURE — 86617 LYME DISEASE ANTIBODY: CPT

## 2019-01-26 PROCEDURE — 84157 ASSAY OF PROTEIN OTHER: CPT

## 2019-01-26 PROCEDURE — 82042 OTHER SOURCE ALBUMIN QUAN EA: CPT

## 2019-01-26 PROCEDURE — 82945 GLUCOSE OTHER FLUID: CPT

## 2019-01-26 PROCEDURE — 74011250637 HC RX REV CODE- 250/637: Performed by: FAMILY MEDICINE

## 2019-01-26 PROCEDURE — 009U3ZX DRAINAGE OF SPINAL CANAL, PERCUTANEOUS APPROACH, DIAGNOSTIC: ICD-10-PCS | Performed by: STUDENT IN AN ORGANIZED HEALTH CARE EDUCATION/TRAINING PROGRAM

## 2019-01-26 RX ORDER — LIDOCAINE HYDROCHLORIDE 10 MG/ML
5 INJECTION, SOLUTION EPIDURAL; INFILTRATION; INTRACAUDAL; PERINEURAL
Status: COMPLETED | OUTPATIENT
Start: 2019-01-26 | End: 2019-01-26

## 2019-01-26 RX ORDER — LIDOCAINE HYDROCHLORIDE 10 MG/ML
INJECTION, SOLUTION EPIDURAL; INFILTRATION; INTRACAUDAL; PERINEURAL
Status: COMPLETED
Start: 2019-01-26 | End: 2019-01-26

## 2019-01-26 RX ADMIN — TOPIRAMATE 25 MG: 25 TABLET, FILM COATED ORAL at 21:11

## 2019-01-26 RX ADMIN — NYSTATIN: 100000 OINTMENT TOPICAL at 09:32

## 2019-01-26 RX ADMIN — LIDOCAINE HYDROCHLORIDE 5 ML: 10 INJECTION, SOLUTION EPIDURAL; INFILTRATION; INTRACAUDAL; PERINEURAL at 14:00

## 2019-01-26 RX ADMIN — SODIUM CHLORIDE 1000 ML: 900 INJECTION, SOLUTION INTRAVENOUS at 07:07

## 2019-01-26 RX ADMIN — TOPIRAMATE 25 MG: 25 TABLET, FILM COATED ORAL at 09:31

## 2019-01-26 RX ADMIN — HEPARIN SODIUM 5000 UNITS: 5000 INJECTION, SOLUTION INTRAVENOUS; SUBCUTANEOUS at 04:27

## 2019-01-26 RX ADMIN — Medication 10 ML: at 21:11

## 2019-01-26 RX ADMIN — PREDNISOLONE SODIUM PHOSPHATE 1 DROP: 10 SOLUTION/ DROPS OPHTHALMIC at 14:45

## 2019-01-26 RX ADMIN — NYSTATIN: 100000 OINTMENT TOPICAL at 19:57

## 2019-01-26 RX ADMIN — Medication 10 ML: at 07:05

## 2019-01-26 RX ADMIN — Medication 10 ML: at 07:04

## 2019-01-26 RX ADMIN — CLOBETASOL PROPIONATE: 0.5 OINTMENT TOPICAL at 19:56

## 2019-01-26 RX ADMIN — PREDNISOLONE SODIUM PHOSPHATE 1 DROP: 10 SOLUTION/ DROPS OPHTHALMIC at 09:32

## 2019-01-26 RX ADMIN — CLOBETASOL PROPIONATE: 0.5 OINTMENT TOPICAL at 09:32

## 2019-01-26 RX ADMIN — Medication 10 ML: at 09:31

## 2019-01-26 RX ADMIN — PROPRANOLOL HYDROCHLORIDE 10 MG: 10 TABLET ORAL at 21:11

## 2019-01-26 RX ADMIN — PREDNISOLONE SODIUM PHOSPHATE 1 DROP: 10 SOLUTION/ DROPS OPHTHALMIC at 19:57

## 2019-01-26 RX ADMIN — FLUCONAZOLE, SODIUM CHLORIDE 200 MG: 2 INJECTION INTRAVENOUS at 12:38

## 2019-01-26 RX ADMIN — HEPARIN SODIUM 5000 UNITS: 5000 INJECTION, SOLUTION INTRAVENOUS; SUBCUTANEOUS at 21:11

## 2019-01-26 RX ADMIN — Medication 10 ML: at 21:12

## 2019-01-26 RX ADMIN — Medication 10 ML: at 14:49

## 2019-01-26 NOTE — ROUTINE PROCESS
Bedside shift change report given to sree stevenson rn (oncoming nurse) by Macrina Marino (offgoing nurse). Report included the following information SBAR and Kardex.

## 2019-01-26 NOTE — PROGRESS NOTES
Spiritual Care Partner Volunteer visited patient in room 202/02 on 1.26.19. Documented by: : Rev. Rob Torres. Marian Puri; [facility], to contact [phone] call: [phone]

## 2019-01-26 NOTE — PROGRESS NOTES
Bedside shift change report given to Ellie Puente (oncoming nurse) by Allison Khan RN (offgoing nurse). Report included the following information SBAR, Kardex and MAR.

## 2019-01-26 NOTE — PROGRESS NOTES
Brief: 
 
Have spoken with radiology , attempts to do LP today as can vs tomorrow in the mill for pt. Jordan Lam MD 1/26/2019

## 2019-01-26 NOTE — PROGRESS NOTES
Bedside shift change report given to Beckie Saavedra RN (oncoming nurse) by Sarah Hylton RN RN (offgoing nurse). Report included the following information SBAR, Kardex and MAR.

## 2019-01-26 NOTE — PROGRESS NOTES
Problem: Falls - Risk of 
Goal: *Absence of Falls Document [place] Dust Fall Risk and appropriate interventions in the flowsheet. Outcome: Progressing Towards Goal 
Fall Risk Interventions: 
  
 
  
 
Medication Interventions: Teach patient to arise slowly Elimination Interventions: Call light in reach

## 2019-01-26 NOTE — PROGRESS NOTES
Hospitalist Progress Note Jean Chaidez MD 
Answering service: [phone] OR [phone] from in house phone C Date of Service:  2019 NAME:  Qamar Lilly :  1980 MRN:  [id number] Admission Summary:  
Pt admitted to [facility] rx for lyme uveitis, has rt facial paion, headache, n/v Interval history / Subjective:  
   
2019 Per pt, the abx are approved and at [facility] pharmacy in powder form which is problematic for dosing at home Pt to call pcp to see if he has answer for administration of meds, else pt now with PICC for dosing 2 gm rocephin iv x 28 days   . I D consulted to write abx orders as this will facilitate [place] delivery and education on home admin of meds,   Otherwise   the pcp may be able to arrange through the infusion center but this is not clear. The presumptive dx of lyme uveitis is a combined dx from both opthalmonogist by verbal report and  and pcp and pt with +  Titers After noting all of the above, seems best that pt have formal ID consult and disposiotn pending full eval , pt to remain in hospital till dx and treatment is worked out , have discussed case with  Dr. Scott Urias who kindly agreed to see pt in consultation. 2019 : 
Radiology to accomplish LP Pt agreeing Suspect as ID that pt 's lead dd is MS. Assessment & Plan:  
 
Lyme uveitis presumptive dx for rocephin and close f/u with ophthalmology on discharge and pred forte  ( vs other as in MS) for now treatment same but abx held 2n2 no clear indication pending further eval  
 
Yeast vagintis. ingunal candida for iv diflucan as pt has had difficulty treating effectively w po trial in  Past, may need alternative rx , for now iv diflucan. Obesity Body mass index is 36.65 kg/m². Code status: full DVT prophylaxis: Heparin Care Plan discussed with: Patient/Family, Nurse and  Disposition: Home w/Family and TBD Hospital Problems  Date Reviewed: 1/18/2019 Codes Class Noted POA Uveitis ICD-10-CM: H20.9 ICD-9-CM: 364.3  1/24/2019 Unknown Review of Systems:  
 improved nasuse, headache, numbness of face, rt eye pain, Vital Signs:  
 Last 24hrs VS reviewed since prior progress note. Most recent are: 
Visit Vitals /76 (BP 1 Location: Left arm, BP Patient Position: Sitting) Pulse 68 Temp 99 °F (37.2 °C) Resp 17 Wt 103 kg (227 lb 1.6 oz) SpO2 98% Breastfeeding? No  
BMI 36.65 kg/m² No intake or output data in the 24 hours ending 01/26/19 0912 Physical Examination:  
 
 
     
Constitutional:  No acute distress, cooperative, pleasant   
ENT:  Oral mucous moist, oropharynx benign. Neck supple, Resp:  CTA bilaterally. No wheezing/rhonchi/rales. No accessory muscle use CV:  Regular rhythm, normal rate, no murmurs, gallops, rubs GI:  Soft, non distended, non tender. normoactive bowel sounds, no hepatosplenomegaly Musculoskeletal:  No edema, warm, 2+ pulses throughout Neurologic:  Moves all extremities. AAOx3, CN II-XII reviewed Psych:  Good insight, Not anxious nor agitated. Skin:  inguinal candidiasis Data Review:  
 Review and/or order of clinical lab test 
 
 
Labs:  
 
Recent Labs  
  01/25/19 
0019 01/24/19 
1721 WBC 6.6 7.4 HGB 10.7* 11.9 HCT 34.0* 39.2  178 Recent Labs  
  01/25/19 
0019 01/24/19 
1721  142  
K 3.7 3.9 * 114* CO2 21 22 BUN 11 11 CREA 0.87 0.83 * 86  
CA 8.1* 8.0* Recent Labs  
  01/24/19 
1721 SGOT 14* ALT 27 AP 72 TBILI 0.2 TP 7.1 ALB 3.1*  
GLOB 4.0 No results for input(s): INR, PTP, APTT in the last 72 hours. No lab exists for component: INREXT, INREXT No results for input(s): FE, TIBC, PSAT, FERR in the last 72 hours.   
No results found for: FOL, RBCF  
 No results for input(s): PH, PCO2, PO2 in the last 72 hours. No results for input(s): CPK, CKNDX, TROIQ in the last 72 hours. No lab exists for component: CPKMB No results found for: CHOL, CHOLX, CHLST, CHOLV, HDL, LDL, LDLC, DLDLP, TGLX, TRIGL, TRIGP, CHHD, CHHDX Lab Results Component Value Date/Time Glucose (POC) 101 (H) 07/23/2015 09:30 PM  
 Glucose (POC) 104 07/10/2014 11:43 AM  
 
Lab Results Component Value Date/Time Color YELLOW/STRAW 10/16/2016 05:34 PM  
 Appearance CLEAR 10/16/2016 05:34 PM  
 Specific gravity 1.022 10/16/2016 05:34 PM  
 Specific gravity >1.030 (H) 07/23/2015 10:05 PM  
 pH (UA) 6.0 10/16/2016 05:34 PM  
 Protein NEGATIVE  10/16/2016 05:34 PM  
 Glucose NEGATIVE  10/16/2016 05:34 PM  
 Ketone NEGATIVE  10/16/2016 05:34 PM  
 Bilirubin NEGATIVE  10/16/2016 05:34 PM  
 Urobilinogen 0.2 10/16/2016 05:34 PM  
 Nitrites NEGATIVE  10/16/2016 05:34 PM  
 Leukocyte Esterase NEGATIVE  10/16/2016 05:34 PM  
 Epithelial cells FEW 10/16/2016 05:34 PM  
 Bacteria NEGATIVE  10/16/2016 05:34 PM  
 WBC 0-4 10/16/2016 05:34 PM  
 RBC 10-20 10/16/2016 05:34 PM  
 
 
 
Medications Reviewed:  
 
Current Facility-Administered Medications Medication Dose Route Frequency  clobetasol (TEMOVATE) 0.05 % ointment   Topical BID  nystatin (MYCOSTATIN) 100,000 unit/gram ointment   Topical BID  sodium chloride (NS) flush 20 mL  20 mL InterCATHeter PRN  
 sodium chloride (NS) flush 10 mL  10 mL InterCATHeter Q24H  
 sodium chloride (NS) flush 10 mL  10 mL InterCATHeter PRN  
 sodium chloride (NS) flush 10 mL  10 mL InterCATHeter Q8H  
 alteplase (CATHFLO) 1 mg in sterile water (preservative free) 1 mL injection  1 mg InterCATHeter PRN  
 fluconazole (DIFLUCAN) 200mg/100 mL IVPB (premix)  200 mg IntraVENous Q24H  
 oxyCODONE-acetaminophen (PERCOCET) 5-325 mg per tablet 1-2 Tab  1-2 Tab Oral Q4H PRN  
 0.9% sodium chloride infusion 1,000 mL  1,000 mL IntraVENous CONTINUOUS  
  sodium chloride (NS) flush 5-40 mL  5-40 mL IntraVENous Q8H  
 sodium chloride (NS) flush 5-40 mL  5-40 mL IntraVENous PRN  
 LORazepam (ATIVAN) tablet 0.5 mg  0.5 mg Oral BID PRN  
 heparin (porcine) injection 5,000 Units  5,000 Units SubCUTAneous Q8H  
 albuterol (PROVENTIL VENTOLIN) nebulizer solution 2.5 mg  2.5 mg Nebulization Q6H PRN  
 cyclobenzaprine (FLEXERIL) tablet 5 mg  5 mg Oral QHS  ondansetron (ZOFRAN ODT) tablet 4 mg  4 mg Oral Q8H PRN  propranolol (INDERAL) tablet 10 mg  10 mg Oral QHS  budesonide (PULMICORT) 500 mcg/2 ml nebulizer suspension  500 mcg Nebulization BID RT  
 prednisoLONE sodium phosphate (INFLAMASE FORTE) 1 % ophthalmic solution 1 Drop  1 Drop Right Eye QID  ondansetron (ZOFRAN) injection 4 mg  4 mg IntraVENous Q4H PRN  
 topiramate (TOPAMAX) tablet 25 mg  25 mg Oral Q12H  
 
______________________________________________________________________ EXPECTED LENGTH OF STAY: 2d 16h ACTUAL LENGTH OF STAY:          2 Lila Chiu MD

## 2019-01-27 ENCOUNTER — APPOINTMENT (OUTPATIENT)
Dept: VASCULAR SURGERY | Age: 39
DRG: 724 | End: 2019-01-27
Attending: INTERNAL MEDICINE
Payer: MEDICAID

## 2019-01-27 PROCEDURE — 36415 COLL VENOUS BLD VENIPUNCTURE: CPT

## 2019-01-27 PROCEDURE — 65270000032 HC RM SEMIPRIVATE

## 2019-01-27 PROCEDURE — 74011250637 HC RX REV CODE- 250/637: Performed by: INTERNAL MEDICINE

## 2019-01-27 PROCEDURE — 74011000258 HC RX REV CODE- 258: Performed by: INTERNAL MEDICINE

## 2019-01-27 PROCEDURE — 86618 LYME DISEASE ANTIBODY: CPT

## 2019-01-27 PROCEDURE — 74011250637 HC RX REV CODE- 250/637: Performed by: FAMILY MEDICINE

## 2019-01-27 PROCEDURE — 74011250636 HC RX REV CODE- 250/636: Performed by: INTERNAL MEDICINE

## 2019-01-27 RX ADMIN — HEPARIN SODIUM 5000 UNITS: 5000 INJECTION, SOLUTION INTRAVENOUS; SUBCUTANEOUS at 18:31

## 2019-01-27 RX ADMIN — PROPRANOLOL HYDROCHLORIDE 10 MG: 10 TABLET ORAL at 21:59

## 2019-01-27 RX ADMIN — PREDNISOLONE SODIUM PHOSPHATE 1 DROP: 10 SOLUTION/ DROPS OPHTHALMIC at 12:11

## 2019-01-27 RX ADMIN — Medication 10 ML: at 22:07

## 2019-01-27 RX ADMIN — PREDNISOLONE SODIUM PHOSPHATE 1 DROP: 10 SOLUTION/ DROPS OPHTHALMIC at 10:12

## 2019-01-27 RX ADMIN — SODIUM CHLORIDE 1000 ML: 900 INJECTION, SOLUTION INTRAVENOUS at 01:25

## 2019-01-27 RX ADMIN — PREDNISOLONE SODIUM PHOSPHATE 1 DROP: 10 SOLUTION/ DROPS OPHTHALMIC at 22:00

## 2019-01-27 RX ADMIN — NYSTATIN: 100000 OINTMENT TOPICAL at 17:23

## 2019-01-27 RX ADMIN — PREDNISOLONE SODIUM PHOSPHATE 1 DROP: 10 SOLUTION/ DROPS OPHTHALMIC at 17:23

## 2019-01-27 RX ADMIN — HEPARIN SODIUM 5000 UNITS: 5000 INJECTION, SOLUTION INTRAVENOUS; SUBCUTANEOUS at 12:11

## 2019-01-27 RX ADMIN — TOPIRAMATE 25 MG: 25 TABLET, FILM COATED ORAL at 10:11

## 2019-01-27 RX ADMIN — Medication 10 ML: at 16:11

## 2019-01-27 RX ADMIN — Medication 10 ML: at 06:34

## 2019-01-27 RX ADMIN — Medication 10 ML: at 10:11

## 2019-01-27 RX ADMIN — CLOBETASOL PROPIONATE: 0.5 OINTMENT TOPICAL at 10:11

## 2019-01-27 RX ADMIN — CEFTRIAXONE SODIUM 2 G: 2 INJECTION, POWDER, FOR SOLUTION INTRAMUSCULAR; INTRAVENOUS at 13:38

## 2019-01-27 RX ADMIN — HEPARIN SODIUM 5000 UNITS: 5000 INJECTION, SOLUTION INTRAVENOUS; SUBCUTANEOUS at 04:19

## 2019-01-27 RX ADMIN — Medication 10 ML: at 06:33

## 2019-01-27 RX ADMIN — TOPIRAMATE 25 MG: 25 TABLET, FILM COATED ORAL at 21:58

## 2019-01-27 RX ADMIN — CLOBETASOL PROPIONATE: 0.5 OINTMENT TOPICAL at 17:23

## 2019-01-27 RX ADMIN — NYSTATIN: 100000 OINTMENT TOPICAL at 10:12

## 2019-01-27 RX ADMIN — FLUCONAZOLE, SODIUM CHLORIDE 200 MG: 2 INJECTION INTRAVENOUS at 12:10

## 2019-01-27 NOTE — PROGRESS NOTES
Bedside shift change report given to Alex Buenrostro (oncoming nurse) by Severo Dings (offgoing nurse). Report included the following information SBAR, Kardex, Intake/Output, MAR and Recent Results.

## 2019-01-27 NOTE — PROGRESS NOTES
Bedside shift change report given to Robet Brunner RN (oncoming nurse) by Berlin Manzano (offgoing nurse). Report included the following information SBAR, Kardex and MAR.

## 2019-01-27 NOTE — PROGRESS NOTES
Problem: Falls - Risk of 
Goal: *Absence of Falls Document Ebb Armenian Fall Risk and appropriate interventions in the flowsheet. Outcome: Progressing Towards Goal 
Fall Risk Interventions: 
  
 
  
 
Medication Interventions: Teach patient to arise slowly Elimination Interventions: Call light in reach

## 2019-01-27 NOTE — PROGRESS NOTES
Hospitalist Progress Note Kelly Aguilera MD 
Answering service: [phone] OR [phone] from in house phone C Date of Service:  2019 NAME:  Malissa Chavez :  1980 MRN:  [id number] Admission Summary:  
[facility] for lyme uveitis, has rt facial paion, headache, n/v Interval history / Subjective:  
   
2019 Per pt, the abx are approved and at [facility] pharmacy in powder form which is problematic for dosing at home Pt to call pcp to see if he has answer for administration of meds, else pt now with PICC for dosing 2 gm rocephin iv x 28 days   . I D consulted to write abx orders as this will facilitate [place] delivery and education on home admin of meds,   Otherwise   the pcp may be able to arrange through the infusion center but this is not clear. The presumptive dx of lyme uveitis is a combined dx from both opthalmonogist by verbal report and  and pcp and pt with +  Titers After noting all of the above, seems best that pt have formal ID consult and disposiotn pending full eval , pt to remain in hospital till dx and treatment is worked out , have discussed case with  Dr. Jennifer Young who kindly agreed to see pt in consultation. 2019 : 
Radiology to accomplish LP Pt agreeing Suspect as ID that pt 's lead dd is MS.  
 
2019 : 
Some left leg pain on walking, hsravan lower ext swelling noted Us lt leg and dc ivf's cont in hospital for now. Studies today are neg as to LP. Serology/immunonogy on Lyme/MS pending Assessment & Plan:  
 
Lyme uveitis presumptive dx for rocephin and close f/u with ophthalmology on discharge and pred forte  ( vs other as in MS) for now treatment same but abx held 2n2 no clear indication pending further eval  
 
Yeast vagintis. ingunal candida for iv diflucan as pt has had difficulty treating effectively w po trial in  Past, may need alternative rx , for now iv diflucan. Obesity Body mass index is 35.51 kg/m². Code status: full DVT prophylaxis: Heparin Care Plan discussed with: Patient/Family, Nurse and  Disposition: Home w/Family and TBD Hospital Problems  Date Reviewed: 1/18/2019 Codes Class Noted POA Uveitis ICD-10-CM: H20.9 ICD-9-CM: 364.3  1/24/2019 Unknown Review of Systems:  
 as above Vital Signs:  
 Last 24hrs VS reviewed since prior progress note. Most recent are: 
Visit Vitals /69 (BP 1 Location: Left arm, BP Patient Position: At rest) Pulse 66 Temp 98.5 °F (36.9 °C) Resp 17 Wt 99.8 kg (220 lb) SpO2 99% Breastfeeding? No  
BMI 35.51 kg/m² No intake or output data in the 24 hours ending 01/27/19 1129 Physical Examination:  
 
 
     
Constitutional:  No acute distress, cooperative, pleasant   
ENT:  Oral mucous moist, oropharynx benign. Neck supple, Resp:  CTA bilaterally. No wheezing/rhonchi/rales. No accessory muscle use CV:  Regular rhythm, normal rate, no murmurs, gallops, rubs GI:  Soft, non distended, non tender. normoactive bowel sounds, no hepatosplenomegaly Musculoskeletal:  trace edema, warm, 2+ pulses throughout Neurologic:  Moves all extremities. AAOx3, CN II-XII reviewed Psych:  Good insight, Not anxious nor agitated. Skin:  inguinal candidiasis Data Review:  
 Review and/or order of clinical lab test 
 
 
Labs:  
 
Recent Labs  
  01/25/19 
0019 01/24/19 
1721 WBC 6.6 7.4 HGB 10.7* 11.9 HCT 34.0* 39.2  178 Recent Labs  
  01/25/19 
0019 01/24/19 
1721  142  
K 3.7 3.9 * 114* CO2 21 22 BUN 11 11 CREA 0.87 0.83 * 86  
CA 8.1* 8.0* Recent Labs  
  01/24/19 
1721 SGOT 14* ALT 27 AP 72 TBILI 0.2 TP 7.1 ALB 3.1*  
GLOB 4.0 No results for input(s): INR, PTP, APTT in the last 72 hours.  
 
No lab exists for component: INREXT, INREXT  
 No results for input(s): FE, TIBC, PSAT, FERR in the last 72 hours. No results found for: FOL, RBCF No results for input(s): PH, PCO2, PO2 in the last 72 hours. No results for input(s): CPK, CKNDX, TROIQ in the last 72 hours. No lab exists for component: CPKMB No results found for: CHOL, CHOLX, CHLST, CHOLV, HDL, LDL, LDLC, DLDLP, TGLX, TRIGL, TRIGP, CHHD, CHHDX Lab Results Component Value Date/Time Glucose (POC) 101 (H) 07/23/2015 09:30 PM  
 Glucose (POC) 104 07/10/2014 11:43 AM  
 
Lab Results Component Value Date/Time Color YELLOW/STRAW 10/16/2016 05:34 PM  
 Appearance CLEAR 10/16/2016 05:34 PM  
 Specific gravity 1.022 10/16/2016 05:34 PM  
 Specific gravity >1.030 (H) 07/23/2015 10:05 PM  
 pH (UA) 6.0 10/16/2016 05:34 PM  
 Protein NEGATIVE  10/16/2016 05:34 PM  
 Glucose NEGATIVE  10/16/2016 05:34 PM  
 Ketone NEGATIVE  10/16/2016 05:34 PM  
 Bilirubin NEGATIVE  10/16/2016 05:34 PM  
 Urobilinogen 0.2 10/16/2016 05:34 PM  
 Nitrites NEGATIVE  10/16/2016 05:34 PM  
 Leukocyte Esterase NEGATIVE  10/16/2016 05:34 PM  
 Epithelial cells FEW 10/16/2016 05:34 PM  
 Bacteria NEGATIVE  10/16/2016 05:34 PM  
 WBC 0-4 10/16/2016 05:34 PM  
 RBC 10-20 10/16/2016 05:34 PM  
 
 
 
Medications Reviewed:  
 
Current Facility-Administered Medications Medication Dose Route Frequency  clobetasol (TEMOVATE) 0.05 % ointment   Topical BID  nystatin (MYCOSTATIN) 100,000 unit/gram ointment   Topical BID  sodium chloride (NS) flush 20 mL  20 mL InterCATHeter PRN  
 sodium chloride (NS) flush 10 mL  10 mL InterCATHeter Q24H  
 sodium chloride (NS) flush 10 mL  10 mL InterCATHeter PRN  
 sodium chloride (NS) flush 10 mL  10 mL InterCATHeter Q8H  
 alteplase (CATHFLO) 1 mg in sterile water (preservative free) 1 mL injection  1 mg InterCATHeter PRN  
 fluconazole (DIFLUCAN) 200mg/100 mL IVPB (premix)  200 mg IntraVENous Q24H  
 oxyCODONE-acetaminophen (PERCOCET) 5-325 mg per tablet 1-2 Tab  1-2 Tab Oral Q4H PRN  
 sodium chloride (NS) flush 5-40 mL  5-40 mL IntraVENous Q8H  
 sodium chloride (NS) flush 5-40 mL  5-40 mL IntraVENous PRN  
 LORazepam (ATIVAN) tablet 0.5 mg  0.5 mg Oral BID PRN  
 heparin (porcine) injection 5,000 Units  5,000 Units SubCUTAneous Q8H  
 albuterol (PROVENTIL VENTOLIN) nebulizer solution 2.5 mg  2.5 mg Nebulization Q6H PRN  
 cyclobenzaprine (FLEXERIL) tablet 5 mg  5 mg Oral QHS  ondansetron (ZOFRAN ODT) tablet 4 mg  4 mg Oral Q8H PRN  propranolol (INDERAL) tablet 10 mg  10 mg Oral QHS  budesonide (PULMICORT) 500 mcg/2 ml nebulizer suspension  500 mcg Nebulization BID RT  
 prednisoLONE sodium phosphate (INFLAMASE FORTE) 1 % ophthalmic solution 1 Drop  1 Drop Right Eye QID  ondansetron (ZOFRAN) injection 4 mg  4 mg IntraVENous Q4H PRN  
 topiramate (TOPAMAX) tablet 25 mg  25 mg Oral Q12H  
 
______________________________________________________________________ EXPECTED LENGTH OF STAY: 2d 16h ACTUAL LENGTH OF STAY:          3 Sofi Adam MD

## 2019-01-27 NOTE — PROGRESS NOTES
Bedside shift change report given to [address] (oncoming nurse) by Alex Buenrostro (offgoing nurse). Report included the following information SBAR, Kardex, MAR and Recent Results.

## 2019-01-28 ENCOUNTER — OFFICE VISIT (OUTPATIENT)
Dept: FAMILY MEDICINE CLINIC | Age: 39
End: 2019-01-28

## 2019-01-28 ENCOUNTER — APPOINTMENT (OUTPATIENT)
Dept: VASCULAR SURGERY | Age: 39
DRG: 724 | End: 2019-01-28
Attending: INTERNAL MEDICINE
Payer: MEDICAID

## 2019-01-28 VITALS
SYSTOLIC BLOOD PRESSURE: 97 MMHG | OXYGEN SATURATION: 97 % | DIASTOLIC BLOOD PRESSURE: 66 MMHG | BODY MASS INDEX: 35.16 KG/M2 | WEIGHT: 217.81 LBS | RESPIRATION RATE: 16 BRPM | TEMPERATURE: 99 F | HEART RATE: 74 BPM

## 2019-01-28 PROCEDURE — 74011250637 HC RX REV CODE- 250/637: Performed by: FAMILY MEDICINE

## 2019-01-28 PROCEDURE — 74011000258 HC RX REV CODE- 258: Performed by: INTERNAL MEDICINE

## 2019-01-28 PROCEDURE — 93971 EXTREMITY STUDY: CPT

## 2019-01-28 PROCEDURE — 74011250636 HC RX REV CODE- 250/636: Performed by: INTERNAL MEDICINE

## 2019-01-28 RX ORDER — OXYCODONE AND ACETAMINOPHEN 5; 325 MG/1; MG/1
1-2 TABLET ORAL
Qty: 8 TAB | Refills: 0 | Status: SHIPPED | OUTPATIENT
Start: 2019-01-28 | End: 2019-02-07 | Stop reason: ALTCHOICE

## 2019-01-28 RX ORDER — PREDNISOLONE SODIUM PHOSPHATE 10 MG/ML
1 SOLUTION/ DROPS OPHTHALMIC 4 TIMES DAILY
Qty: 5 ML | Refills: 0 | Status: SHIPPED | OUTPATIENT
Start: 2019-01-28 | End: 2019-02-07 | Stop reason: ALTCHOICE

## 2019-01-28 RX ADMIN — NYSTATIN: 100000 OINTMENT TOPICAL at 09:30

## 2019-01-28 RX ADMIN — PREDNISOLONE SODIUM PHOSPHATE 1 DROP: 10 SOLUTION/ DROPS OPHTHALMIC at 12:10

## 2019-01-28 RX ADMIN — Medication 10 ML: at 06:21

## 2019-01-28 RX ADMIN — TOPIRAMATE 25 MG: 25 TABLET, FILM COATED ORAL at 09:29

## 2019-01-28 RX ADMIN — Medication 10 ML: at 13:19

## 2019-01-28 RX ADMIN — CEFTRIAXONE SODIUM 2 G: 2 INJECTION, POWDER, FOR SOLUTION INTRAMUSCULAR; INTRAVENOUS at 13:18

## 2019-01-28 RX ADMIN — HEPARIN SODIUM 5000 UNITS: 5000 INJECTION, SOLUTION INTRAVENOUS; SUBCUTANEOUS at 12:00

## 2019-01-28 RX ADMIN — Medication 10 ML: at 09:29

## 2019-01-28 RX ADMIN — PREDNISOLONE SODIUM PHOSPHATE 1 DROP: 10 SOLUTION/ DROPS OPHTHALMIC at 09:30

## 2019-01-28 RX ADMIN — Medication 10 ML: at 06:20

## 2019-01-28 RX ADMIN — CLOBETASOL PROPIONATE: 0.5 OINTMENT TOPICAL at 09:29

## 2019-01-28 RX ADMIN — HEPARIN SODIUM 5000 UNITS: 5000 INJECTION, SOLUTION INTRAVENOUS; SUBCUTANEOUS at 04:32

## 2019-01-28 RX ADMIN — FLUCONAZOLE, SODIUM CHLORIDE 200 MG: 2 INJECTION INTRAVENOUS at 12:07

## 2019-01-28 NOTE — PROGRESS NOTES
Bedside and Verbal shift change report given to Milagros Bautista RN (oncoming nurse) by Judy Urias RN (offgoing nurse). Report included the following information SBAR, Kardex, MAR and Recent Results.

## 2019-01-28 NOTE — PROGRESS NOTES
Problem: Falls - Risk of 
Goal: *Absence of Falls Document Emma Nam Fall Risk and appropriate interventions in the flowsheet. Outcome: Progressing Towards Goal 
Fall Risk Interventions: 
  
 
  
 
Medication Interventions: Teach patient to arise slowly Elimination Interventions: Call light in reach

## 2019-01-28 NOTE — TELEPHONE ENCOUNTER
1/27/2019 9:35 PM    Called patient again. Patient answered on cell. She is still admitted at [facility]. Clinical picture now with L leg swelling/ numbness per patient. Is being worked up for Lyme, possible MS, possible blood clot? States PIC was placed and she is planning on going to outpatient infusion center daily (will be driven by her Mom) after DC at [facility] if Lyme comes back positive. States she will likely be admitted until Tuesday. ID is following. States they also did MS studies and are awaiting that as well. She was put back on eye drops after ID talked with Optho per patient. Patient was appreciative over the phone. States she has felt well supported by staff at [facility] and at [facility]. Discussed if she has any questions or there is anything needed from our end to please call [facility].     Lesvia Duran, DO

## 2019-01-28 NOTE — PROGRESS NOTES
OPAT 
1) care of PICC 
2) Ceftriaxone 2 grams IVQD until 2/16/19 3) CBC/CMP weekly on wed ( or any day of convenience) 4) Fax results to [facility] [phone] 
2) call [facility] Ny[facility] with any questions [phone] 6) PICC can be removed after completion of antibiotic

## 2019-01-28 NOTE — DISCHARGE INSTRUCTIONS
Discharge Instructions       PATIENT ID: Jess Mercado  MRN: [id number]   YOB: 1980    DATE OF ADMISSION: 1/24/2019  2:26 PM    DATE OF DISCHARGE: 1/28/2019    PRIMARY CARE PROVIDER: Janessa Loza DO     ATTENDING PHYSICIAN: Jp Lazaro MD  DISCHARGING PROVIDER: Margarita Kan MD    To contact this individual call [phone] and ask the  to page. If unavailable ask to be transferred the Adult Hospitalist Department. DISCHARGE DIAGNOSES lyme uveitis is working dx     CONSULTATIONS: IP CONSULT TO OPHTHALMOLOGY  IP CONSULT TO INFECTIOUS DISEASES    PROCEDURES/SURGERIES: * No surgery found *    PENDING TEST RESULTS:   At the time of discharge the following test results are still pending: na    FOLLOW UP APPOINTMENTS:   Follow-up Information     Follow up With Specialties Details Why [address] [facility] [address]  [address] [facility] DME Services  IV [address] [place] [facility] [address]  [phone]    Dalila George MD Infectious Diseases, Internal Medicine  as directed  [address]  [address] [facility] MD [facility] Ophthalmology, Ophthalmology In 1 week  [address] [facility] 100  [facility] [phone]             ADDITIONAL CARE RECOMMENDATIONS: do not  coninue pred forte beyound one week w/o directions of an ophthalmologist     DIET: Resume previous diet     ACTIVITY: Activity as tolerated    WOUND CARE: na    EQUIPMENT needed: na      DISCHARGE MEDICATIONS:   See Medication Reconciliation Form    · It is important that you take the medication exactly as they are prescribed. · Keep your medication in the bottles provided by the pharmacist and keep a list of the medication names, dosages, and times to be taken in your wallet.    · Do not take other medications without consulting your doctor. NOTIFY YOUR PHYSICIAN FOR ANY OF THE FOLLOWING:   Fever over 101 degrees for 24 hours. Chest pain, shortness of breath, fever, chills, nausea, vomiting, diarrhea, change in mentation, falling, weakness, bleeding. Severe pain or pain not relieved by medications. Or, any other signs or symptoms that you may have questions about.       DISPOSITION:    Home With:   OT  PT  HH  RN       SNF/Inpatient Rehab/LTAC   x Independent/assisted living    Hospice    Other:          Signed:   Kirsty Hoff MD  1/28/2019  12:45 PM

## 2019-01-28 NOTE — PROGRESS NOTES
Hospital follow-up PCP transitional care appointment has been scheduled with Dr. Darrin Rooney for Friday, 2/1/19 at 11:00 a.m. Pending patient discharge.   Livier Luke, Care Management Specialist.

## 2019-01-28 NOTE — PROGRESS NOTES
Palma Peraza  is a 45 y.o. 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 As per patient she started developing blurred vision of the right eye mid last year and saw Dr. Diaz Warren, an ophthalmologist for the first time on September 14, 2018. During that visit she had mentioned about pain along with blurring and discharge of the right eye which was going on for nearly a week and also complained of pain with eye movement. I spoke to Dr. Diaz Warren and got her records. Her notes mention during that visit the optic disc had no edema was good color with no vascularization. The vitreous was clear. The macula was normal with no edema the cup-to-disc size was 0.8, ,the external eye there was blepharitis, 1+ cell was found in the iris. 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 She wanted her to go to the emergency room and get neurology consult and also get an MRI. Patient had an MRI done on September 14, 2018 and that showed a few nonspecific left parietal white matter T2 hyperintensities. The orbits were normal appearing. Patient at that time was being followed by her PCP at [facility] and she  switched to family medicine at [facility] in October. She went back to Dr. Diaz Warren on September 18, 2018 and she asked her to continue with the prednisone forte drops for her eye and and she asked for the following test to be done to rule out the reason for uveitis including ESR CRP CANDIDA, rheumatoid factor, Lyme titer, ACE level, syphilis serology and HIV antibody testing.   Patient saw a neurologist on October 8, 2018 for severe headache on the right frontal and temporal area and also right periorbital pain shooting to the back of the head with blurred vision. He wondered whether the patient had migraine and wanted to give her the Chema protocol and also prescribed Topamax and was considering a lumbar puncture to check for opening pressure if there was no improvement with the Chema protocol. The patient refused lumbar puncture because of scoliosis. Meanwhile she saw a new PCP Dr. Kat Saba of [facility] and she did the autoimmune workup in October 2018 and sent Lyme serology in December 2018. The Lyme test total antibody came back as 1.02 and the best and last really revealed 6 IgG bands. After discussing with Dr. Joseph Bello at [place] [address] she started the patient on doxycycline on 26 December and patient took doxycycline for nearly 9 days and stopped it because of dizziness and GI symptoms. Her PCP then approached Dr. Joseph Bello for an alternate treatment and IV ceftriaxone was recommended. There was some difficulty in arranging IV ceftriaxone. Meanwhile patient received 2 courses of the Chema protocol for the severe headaches. The first course that was given last year did not really make any difference to the headache and the second course was taken on the 8th, ninth and 10 January 2019 and that made some difference to the headache. But a few days ago patient started to have more blurring of her vision , some numbness of the right side of the face along with nausea and vomiting and she called her PCP who asked her to go to the emergency department at [facility].  So patient came to [facility] yesterday. Patient has a constellation of the  symptoms.   Blurring of the right eye with impaired vision, headache on the right side of the face , numbness and pinpricks on the right side of the face, weakness on the right hand and at times dropping things, pins and needles on her feet, absent bladder filling sensation  leading to incontinence  if she does not go to the bathroom on a timed basis. For the latter symptoms she has seen urologist many years ago and it was thought that because of 5 children and 4   this could be a consequence of that. Patient does not have any joint swelling, fever, weight loss, cough, shortness of breath, palpitations. 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  2 years ago when she was at that place she had noted a rash on the left side of her face and neck and a physician at the skilled nursing had given her 5 days of doxycycline and she responded to the antibiotic. She does not remember any tick bite. Patient has 5 children but all of them are staying with her mother as her house has  pest infestation. She states in her attic there is raccoon and possum and she has also noted  rats in her house. She has been in this house which is a section 8 housing since 2018. She has a history of attempted suicide and was at [address] [place] [address] for nearly 2 months. Had Lp on Saturday-opening pressure was 6 cm ( 60mm) Subjective Has some pain left leg- going for doppler Objective: VITALS:   
Visit Vitals /73 (BP 1 Location: Left arm, BP Patient Position: Sitting) Pulse 72 Temp 98.6 °F (37 °C) Resp 18 Wt 217 lb 13 oz (98.8 kg) LMP 2019 (Exact Date) SpO2 96% Breastfeeding? No  
BMI 35.16 kg/m² PHYSICAL EXAM:  
General:    Alert, cooperative, no distress, appears stated age. Lungs:   Clear to auscultation bilaterally. No Wheezing or Rhonchi. No rales. Heart:   Regular rate and rhythm,  no murmur, rub or gallop. Abdomen:   Soft, non-tender,not distended. Bowel sounds normal. No masses Extremities: Right PICC, atraumatic, no cyanosis. No edema. No clubbing Skin:     No rashes or lesions. Or bruising Lymph: Cervical, supraclavicular normal. 
Neurologic: Grossly non-focal 
 
Pertinent Labs 12/13/2018 Antibody IgG/IgM 1.02 Lyme antibody Western blot IgG 5 bands T. pallidum negative CRP 2.93 
ACE level 47 CANDIDA negative HIV negative WBC 6.6 Hemoglobin 10.7 Platelet 092 CR 0.87 ALT 27 AST 14 
 
Csf 1/26/19 Wbc 0 Protein 38 IMAGING RESULTS: 
MRI of the brain from September 2018 reviewed personally with radiologist nonspecific left parietal white matter T2 hyperintensities noted Impression/Recommendation 45 y.o. 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 Patient has constellation of symptoms which includes headache, numbness on the right side of the face, weakness in her right hand with no objective findings on clinical examination. All the symptoms could be explained to being due to migraine. Blurring of the right eye with clinical findings of anterior uveitis and  Ischemic optic  neuritis cannot be explained by migraine. All the etiology for anterior uveitis like syphilis, sarcoid, lupus, rheumatoid arthritis is negative by blood work. The other differential as per ophthalmologist Dr. Maximiliano Sidhu is Lyme disease causing uveitis. The Lyme antibody titer is very minimally elevated and could very well be explained as false positive but in 2016 patient had taken a few days of doxycycline for a rash on the left side of her neck. So this could be partially treated Lyme disease. So will treat this with ceftriaxone for 2-3 weeks No CNS lyme as evidenced by normal CSF examination Ischemic optic neuritis is concerning for multiple sclerosis csf examination normal protein few punctate white matter foci of T2 hyperintensity in the left 
parietal lobe from Sept 2018 
? repeat MRI with contrast 
Follow with neurology as OP ? Neurogenic bladder ?due to previous surgery ? MS Recommend pre-and post void bladder scan Anxiety and depression History of gestational hypertension. ___________________________________________________ Discussed with patient and  Will give IV ceftriaxone as Op for 3 weeks- follow up with

## 2019-01-28 NOTE — DISCHARGE SUMMARY
Discharge Summary       PATIENT ID: Ivette Alexander  MRN: [id number]   YOB: 1980    DATE OF ADMISSION: 1/24/2019  2:26 PM    DATE OF DISCHARGE: 1/28/2019    PRIMARY CARE PROVIDER: Celeste Buchanan DO     ATTENDING PHYSICIAN: Darius Perez MD   DISCHARGING PROVIDER: Darius Perez MD    To contact this individual call [phone] and ask the  to page. If unavailable ask to be transferred the Adult Hospitalist Department. CONSULTATIONS: IP CONSULT TO OPHTHALMOLOGY  IP CONSULT TO INFECTIOUS DISEASES    PROCEDURES/SURGERIES: * No surgery found *    ADMITTING DIAGNOSES & HOSPITAL COURSE:     Per ID:     OPAT  1) care of PICC  2) Ceftriaxone 2 grams IVQD until 2/16/19  3) CBC/CMP weekly on wed ( or any day of convenience)  4) Fax results to [facility] [phone]  8) call [facility] with any questions [phone]  6) PICC           Admission Summary:   [facility], has rt facial paion, headache, n/v      Interval history / Subjective:       1/25/2019   Per pt, the abx are approved and at [facility] pharmacy in powder form which is problematic for dosing at home  Pt to call pcp to see if he has answer for administration of meds, else pt now with PICC for dosing 2 gm rocephin iv x 28 days   . I D consulted to write abx orders as this will facilitate [facility] delivery and education on home admin of meds,   Otherwise   the pcp may be able to arrange through the infusion center but this is not clear.     The presumptive dx of lyme uveitis is a combined dx from both opthalmonogist by verbal report and  and pcp and pt with +  Titers         After noting all of the above, seems best that pt have formal ID consult and disposiotn pending full eval , pt to remain in hospital till dx and treatment is worked out , have discussed case with  Dr. Martha Zuniga who kindly agreed to see pt in consultation.      1/26/2019 :  Radiology to accomplish LP  Pt agreeing  Suspect as ID that pt 's lead dd is MS.      1/27/2019 :  Some left leg pain on walking, shravan lower ext swelling noted  Us lt leg and dc ivf's cont in hospital for now. Studies today are neg as to LP. Serology/immunonogy on Lyme/MS pending            Assessment & Plan:      Lyme uveitis presumptive dx for rocephin and close f/u with ophthalmology on discharge and pred forte  ( vs other as in MS) for now treatment same but abx held 2n2 no clear indication pending further eval      Yeast vagintis. ingunal candida for iv diflucan as pt has had difficulty treating effectively w po trial in  Past, may need alternative rx , for now iv diflucan.      Obesity Body mass index is 35.51 kg/m².         Code status: full   DVT prophylaxis: Heparin      Care Plan discussed with: Patient/Family, Nurse and   Disposition: Home w/Family and TBD                DISCHARGE DIAGNOSES / PLAN:      1.  as above      ADDITIONAL CARE RECOMMENDATIONS:  Pt not to coninue pred forte beyound one week w/o directions of an ophthalmologist     PENDING TEST RESULTS:   At the time of discharge the following test results are still pending: na    FOLLOW UP APPOINTMENTS:    Follow-up Information     Follow up With Specialties Details Why Contact Info    [address] [address]  [facility]  IV [address] [facility] [address]  [phone]    Rosita Rajput MD Infectious Diseases, Internal Medicine  as directed  [address]  Internal [phone] [facility] MD [facility], Ophthalmology In 1 week  [address] [phone]               DIET: Resume previous diet      ACTIVITY: Activity as tolerated    WOUND CARE: na    EQUIPMENT needed: na      DISCHARGE MEDICATIONS:  Current Discharge Medication List START taking these medications    Details   oxyCODONE-acetaminophen (PERCOCET) 5-325 mg per tablet Take 1-2 Tabs by mouth every four (4) hours as needed. Max Daily Amount: 12 Tabs. Qty: 8 Tab, Refills: 0    Associated Diagnoses: Uveitis of right eye      prednisoLONE sodium phosphate (INFLAMASE FORTE) 1 % ophthalmic solution Administer 1 Drop to right eye four (4) times daily. Need to follow up with your ophthalmologist in one week, do not continue medicine beyound one week w/o advise of an ophthalmologist  Qty: 5 mL, Refills: 0         CONTINUE these medications which have NOT CHANGED    Details   clobetasol (TEMOVATE) 0.05 % ointment       lidocaine-tetracaine 7-7 % crea       cyclobenzaprine (FLEXERIL) 5 mg tablet Take 1 Tab by mouth nightly. Qty: 30 Tab, Refills: 0    Associated Diagnoses: Chronic bilateral low back pain with sciatica, sciatica laterality unspecified      nystatin (MYCOSTATIN) 100,000 unit/gram ointment Apply  to affected area two (2) times a day. Qty: 15 g, Refills: 0    Associated Diagnoses: Candidal dermatitis      nystatin (MYCOSTATIN) powder Apply  to affected area four (4) times daily. Qty: 1 Bottle, Refills: 1    Associated Diagnoses: Candidal dermatitis      topiramate ER (TROKENDI XR) 50 mg capsule Take 1 Cap by mouth nightly. Qty: 30 Cap, Refills: 5      buPROPion SR (WELLBUTRIN SR) 100 mg SR tablet Take 100 mg by mouth two (2) times a day. Refills: 1    Associated Diagnoses: Intractable migraine with aura with status migrainosus      propranolol (INDERAL) 10 mg tablet Take 10 mg by mouth nightly as needed. Refills: 4    Associated Diagnoses: Intractable migraine with aura with status migrainosus      hydrocortisone (HYTONE) 2.5 % topical cream Apply  to affected area two (2) times a day. Qty: 30 g, Refills: 0      ondansetron (ZOFRAN ODT) 4 mg disintegrating tablet Take 1 Tab by mouth every eight (8) hours as needed for Nausea.   Qty: 9 Tab, Refills: 0      triamcinolone acetonide (KENALOG) 0.147 mg/gram aero topical spray       albuterol (PROVENTIL VENTOLIN) 2.5 mg /3 mL (0.083 %) nebulizer solution by Nebulization route every four (4) hours as needed. QVAR REDIHALER 40 mcg/actuation HFAb inhaler Take 40 mcg by inhalation two (2) times a day. Refills: 1    Associated Diagnoses: Intractable migraine with aura with status migrainosus         STOP taking these medications       cefTRIAXone (ROCEPHIN) 1 gram injection Comments:   Reason for Stopping:                 NOTIFY YOUR PHYSICIAN FOR ANY OF THE FOLLOWING:   Fever over 101 degrees for 24 hours. Chest pain, shortness of breath, fever, chills, nausea, vomiting, diarrhea, change in mentation, falling, weakness, bleeding. Severe pain or pain not relieved by medications. Or, any other signs or symptoms that you may have questions about. DISPOSITION:    Home With:   OT  PT  HH  RN       Long term SNF/Inpatient Rehab   x Independent/assisted living    Hospice    Other:       PATIENT CONDITION AT DISCHARGE:     Functional status    Poor     Deconditioned    x Independent      Cognition   x  Lucid     Forgetful     Dementia      Catheters/lines (plus indication)    [place]     PICC     PEG    x None      Code status   x  Full code     DNR      PHYSICAL EXAMINATION AT DISCHARGE:   Refer to Progress Note  Visit Vitals  /73 (BP 1 Location: Left arm, BP Patient Position: Sitting)   Pulse 72   Temp 98.6 °F (37 °C)   Resp 18   Wt 98.8 kg (217 lb 13 oz)   LMP 01/05/2019 (Exact Date)   SpO2 96%   Breastfeeding? No   BMI 35.16 kg/m²          CHRONIC MEDICAL DIAGNOSES:  Problem List as of 1/28/2019 Date Reviewed: 1/28/2019          Codes Class Noted - Resolved    Uveitis ICD-10-CM: H20.9  ICD-9-CM: 364.3  1/24/2019 - Present        Chronic anterior uveitis of right eye ICD-10-CM: H20.11  ICD-9-CM: 364.10  12/12/2018 - Present    Overview Signed 12/12/2018  5:42 PM by Micah Cavazos DO     Followed by [facility]. At high risk for BL open angle glaucoma. Ischemic optic neuritis of R eye stable.               Severe obesity ([address] ICD-10-CM: E66.01  ICD-9-CM: 278.01  10/15/2018 - Present        Intractable migraine with aura with status migrainosus ICD-10-CM: B24.953  ICD-9-CM: 346.03  10/15/2018 - Present              Greater than 15 minutes were spent with the patient on counseling and coordination of care    Signed:   Boyd Jefferson MD  1/28/2019  12:46 PM

## 2019-01-28 NOTE — PROGRESS NOTES
Bedside shift change report given to Lacey Leyden, RN (oncoming nurse) by Fawad Cristina RN (offgoing nurse). Report included the following information SBAR, Kardex, Intake/Output and MAR.  
 
 
5349: Pt discharged via private transportation. Given discharge instructions and prescriptions. Answered any questions and concerns. Discharged with single lumen PICC R arm

## 2019-01-29 ENCOUNTER — HOME CARE VISIT (OUTPATIENT)
Dept: SCHEDULING | Facility: HOME HEALTH | Age: 39
End: 2019-01-29
Payer: MEDICAID

## 2019-01-29 VITALS
RESPIRATION RATE: 16 BRPM | OXYGEN SATURATION: 98 % | DIASTOLIC BLOOD PRESSURE: 80 MMHG | HEART RATE: 84 BPM | SYSTOLIC BLOOD PRESSURE: 108 MMHG

## 2019-01-29 LAB
B BURGDOR C6 AB SER IA-ACNC: <0.91 INDEX (ref 0–0.9)
B BURGDOR IGG+IGM SER-ACNC: <0.91 ISR (ref 0–0.9)

## 2019-01-29 PROCEDURE — G0299 HHS/HOSPICE OF RN EA 15 MIN: HCPCS

## 2019-01-29 PROCEDURE — 400013 HH SOC

## 2019-01-30 LAB
ALB CSF/SERPL: 5 {RATIO} (ref 0–8)
ALBUMIN CSF-MCNC: 17 MG/DL (ref 11–48)
ALBUMIN SERPL-MCNC: 3.5 G/DL (ref 3.5–5.5)
B BURGDOR IGG PATRN CSF IB-IMP: NEGATIVE
B BURGDOR IGM PATRN CSF IB-IMP: NEGATIVE
B BURGDOR18KD IGG CSF QL IB: NORMAL
B BURGDOR23KD IGG CSF QL IB: NORMAL
B BURGDOR23KD IGM CSF QL IB: NORMAL
B BURGDOR28KD IGG CSF QL IB: NORMAL
B BURGDOR30KD IGG CSF QL IB: NORMAL
B BURGDOR39KD IGG CSF QL IB: NORMAL
B BURGDOR39KD IGM CSF QL IB: NORMAL
B BURGDOR41KD IGG CSF QL IB: NORMAL
B BURGDOR41KD IGM CSF QL IB: NORMAL
B BURGDOR45KD IGG CSF QL IB: NORMAL
B BURGDOR58KD IGG CSF QL IB: NORMAL
B BURGDOR66KD IGG CSF QL IB: NORMAL
B BURGDOR93KD IGG CSF QL IB: NORMAL
IGG CSF-MCNC: 3.4 MG/DL (ref 0–8.6)
IGG SERPL-MCNC: 1437 MG/DL (ref 700–1600)
IGG SYNTH RATE SER+CSF CALC-MRATE: NORMAL MG/DAY
IGG/ALB CLEAR SER+CSF-RTO: 0.5 (ref 0–0.7)
IGG/ALB CSF: 0.2 {RATIO} (ref 0–0.25)
OLIGOCLONAL BANDS.IT SER+CSF QL: NORMAL

## 2019-01-31 ENCOUNTER — HOME CARE VISIT (OUTPATIENT)
Dept: SCHEDULING | Facility: HOME HEALTH | Age: 39
End: 2019-01-31
Payer: MEDICAID

## 2019-01-31 ENCOUNTER — TELEPHONE (OUTPATIENT)
Dept: FAMILY MEDICINE CLINIC | Age: 39
End: 2019-01-31

## 2019-01-31 PROCEDURE — G0299 HHS/HOSPICE OF RN EA 15 MIN: HCPCS

## 2019-01-31 NOTE — TELEPHONE ENCOUNTER
Terrence Barker [phone]  [facility]    She called to speak with Marian Fraga. Call was taken.

## 2019-02-01 ENCOUNTER — OFFICE VISIT (OUTPATIENT)
Dept: FAMILY MEDICINE CLINIC | Age: 39
End: 2019-02-01

## 2019-02-01 VITALS
TEMPERATURE: 98.4 F | HEIGHT: 66 IN | DIASTOLIC BLOOD PRESSURE: 80 MMHG | SYSTOLIC BLOOD PRESSURE: 117 MMHG | HEART RATE: 74 BPM | WEIGHT: 224 LBS | OXYGEN SATURATION: 99 % | BODY MASS INDEX: 36 KG/M2 | RESPIRATION RATE: 16 BRPM

## 2019-02-01 DIAGNOSIS — H20.9 UVEITIS: ICD-10-CM

## 2019-02-01 RX ORDER — ONDANSETRON 4 MG/1
4 TABLET, ORALLY DISINTEGRATING ORAL
Qty: 12 TAB | Refills: 0 | Status: SHIPPED | OUTPATIENT
Start: 2019-02-01 | End: 2019-10-15

## 2019-02-01 NOTE — PATIENT INSTRUCTIONS
Uveitis: Care Instructions  Your Care Instructions    Uveitis (say \"you-vee-EYE-tus\") is swelling and tenderness of the middle layer of the eye. This area includes the colored part of the eye (iris), muscles, and blood vessels. One or both of your eyes may be swollen, red, and painful. You may have blurred vision. You may have gotten uveitis from an infection, but the cause is often not known. There are three types of uveitis. · Anterior uveitis is the most common type. This is pain and swelling of the front part of the eye. It is treated with eyedrops or ointment and usually lasts less than 6 weeks. · Intermediate uveitis affects the middle of the eye. It may be treated with eyedrops or with medicine given in a shot. It usually lasts longer than 6 weeks. · Posterior uveitis is the least common type. It affects the back of the eye. This is usually treated with medicine. It can last from a few weeks to a few years. It is important to treat uveitis. Treatment can save your eyesight and avoid permanent damage to your eyes. Follow-up care is a key part of your treatment and safety. Be sure to make and go to all appointments, and call your doctor if you are having problems. It's also a good idea to know your test results and keep a list of the medicines you take. How can you care for yourself at home? · Take your medicines exactly as prescribed. Call your doctor if you have any problems with your medicine. You will get more details on the specific medicines your doctor prescribes. · Use any prescribed eyedrops or ointments exactly as your doctor told you to. · Keep the eyedropper or bottle tip clean. · If you are using eyedrops and an ointment, put in the eyedrops before you use the ointment. · To put in eyedrops or ointment:  ? Tilt your head back, and pull your lower eyelid down with one finger. ? Drop or squirt the medicine inside the lower lid.   ? Close your eye for 30 to 60 seconds to let the drops or ointment move around. ? Do not touch the ointment or dropper tip to your eyelashes or any other surface. · Wear sunglasses if light hurts your eyes. · Do not wear contact lenses until your eyes have healed. · Do not wear eye makeup until your eyes have healed. · Do not drive if you have blurred vision. When should you call for help? Call your doctor now or seek immediate medical care if:    · You have signs of an eye infection, such as:  ? Pus or thick discharge coming from the eye.  ? Redness or swelling around the eye.  ? A fever.     · You have new or worse eye pain.     · You have vision changes.     · It feels like there is something in your eye.     · Light hurts your eye.    Watch closely for changes in your health, and be sure to contact your doctor if:    · You do not get better as expected. Where can you learn more? Go to http://sarmad-farhana.info/. Enter D451 in the search box to learn more about \"Uveitis: Care Instructions. \"  Current as of: July 17, 2018  Content Version: 11.9  © 5573-2633 [employer]. Care instructions adapted under license by [employer] (which disclaims liability or warranty for this information). If you have questions about a medical condition or this instruction, always ask your healthcare professional. [address] any warranty or liability for your use of this information.

## 2019-02-01 NOTE — PROGRESS NOTES
[address] [facility] [address] [place] [address]   Office [phone], Fax [phone]      Transition of Care Visit    Patient: Edith Dahl MRN: [id number]  SSN: [id number]    YOB: 1980  Age: 45 y.o. Sex: female      Hospital: [facility]  Dates of admission: 1/24/19-1/28/19  Discharge diagnoses: Lyme Uveitis  State of health at discharge: Stable  Surgical or invasive procedures done: Lumbar puncture    Amount and/or Complexity of Data Reviewed:   Clinical lab tests: Reviewed   Tests in the radiology section: reviewed   Discussion of test results with the patient-yes  Obtain previous medical records or obtain history from someone other than the patient: No  Obtain history from someone other than the patient: no  Review and address past medical records: yes  Discuss the patient with another provider: no  Independant visualization of image, tracing, or specimen: yes     Risk of Significant Complications, Morbidity, and/or Mortality:   Presenting problems: High   Diagnostic procedures: Moderate   Management options: Moderate     Progress at discharge:   Stable on Discharge      Progress Note    Patient: Edith Dahl MRN: [id number]  SSN: [id number]    YOB: 1980  Age: 45 y.o. Sex: female        Chief Complaint   Patient presents with   [address] Follow Up         Subjective:     Encounter Diagnoses   Name Primary?  Transition of care performed with sharing of clinical summary Yes    Uveitis      Patient reports to be feeling much better after being in treatment. Before hospitalization, she was having N/V, HA, and blurry vision. She notes that her vision has improved. Went to see Dr. Joe Castillo Ophthalmologist at the [facility] yesterday. She is still using her eyedrops. Will contact Dr. Joe Castillo to ask when to stop, was also referred to see Neuro-ophthalmologist at [address] [place] [address] (does not remember the name).  She has some pain at E where the PICC line is and some nausea in the mornings. Denies fever, chills, vomit, abd pain, chest pain, SOB, LE edema, tenderness, or changes in urinary or bowel habits. Current and past medical information:    Current Medications after this visit[de-identified]     Current Outpatient Medications   Medication Sig    ondansetron (ZOFRAN ODT) 4 mg disintegrating tablet Take 1 Tab by mouth every eight (8) hours as needed for Nausea.  indomethacin (INDOCIN) 50 mg capsule Take 50 mg by mouth three (3) times daily as needed (HEADACHE).  oxyCODONE-acetaminophen (PERCOCET) 5-325 mg per tablet Take 1-2 Tabs by mouth every four (4) hours as needed. Max Daily Amount: 12 Tabs.  prednisoLONE sodium phosphate (INFLAMASE FORTE) 1 % ophthalmic solution Administer 1 Drop to right eye four (4) times daily. Need to follow up with your ophthalmologist in one week, do not continue medicine beyound one week w/o advise of an ophthalmologist    triamcinolone acetonide (KENALOG) 0.147 mg/gram aero topical spray     clobetasol (TEMOVATE) 0.05 % ointment     lidocaine-tetracaine 7-7 % crea     cyclobenzaprine (FLEXERIL) 5 mg tablet Take 1 Tab by mouth nightly. (Patient taking differently: Take 5 mg by mouth nightly as needed.)    nystatin (MYCOSTATIN) 100,000 unit/gram ointment Apply  to affected area two (2) times a day.  nystatin (MYCOSTATIN) powder Apply  to affected area four (4) times daily.  topiramate ER (TROKENDI XR) 50 mg capsule Take 1 Cap by mouth nightly.  albuterol (PROVENTIL VENTOLIN) 2.5 mg /3 mL (0.083 %) nebulizer solution by Nebulization route every four (4) hours as needed.  buPROPion SR (WELLBUTRIN SR) 100 mg SR tablet Take 100 mg by mouth two (2) times a day.  propranolol (INDERAL) 10 mg tablet Take 10 mg by mouth nightly as needed.  QVAR REDIHALER 40 mcg/actuation HFAb inhaler Take 40 mcg by inhalation two (2) times a day.     hydrocortisone (HYTONE) 2.5 % topical cream Apply  to affected area two (2) times a day. No current facility-administered medications for this visit. Patient Active Problem List    Diagnosis Date Noted    Uveitis 2019    Chronic anterior uveitis of right eye 2018    Severe obesity ([address] 10/15/2018    Intractable migraine with aura with status migrainosus 10/15/2018       Past Medical History:   Diagnosis Date    Arthritis     Depression     Headache     Ill-defined condition     scoliosis, gall stones, anemia    Seizures (HCC)        Allergies   Allergen Reactions    Other Food Swelling     All nuts except peanuts    Tramadol Nausea and Vomiting    Nut - Unspecified Swelling    [place] Swelling     Swollen lips       Past Surgical History:   Procedure Laterality Date    HX  SECTION      x 4    HX HERNIA REPAIR      HX TUBAL LIGATION         Social History     Socioeconomic History    Marital status: SINGLE     Spouse name: Not on file    Number of children: Not on file    Years of education: Not on file    Highest education level: Not on file   Tobacco Use    Smoking status: Never Smoker    Smokeless tobacco: Never Used   Substance and Sexual Activity    Alcohol use: No    Drug use: No    Sexual activity: Not Currently       Objective:     Vitals:    19 1109   BP: 117/80   Pulse: 74   Resp: 16   Temp: 98.4 °F (36.9 °C)   TempSrc: Oral   SpO2: 99%   Weight: 224 lb (101.6 kg)   Height: 5' 6\" (1.676 m)      Body mass index is 36.15 kg/m². Physical Exam   Nursing note reviewed. Constitutional: Oriented to person, place, and time. Appears well-developed and well-nourished. No distress. Head: Normocephalic and atraumatic. Eyes: Conjunctivae are normal. Pupils are equal, round. No scleral icterus. EOM intact. Cardiovascular: Normal rate, regular rhythm and normal heart sounds. No murmur heard. Pulmonary/Chest: Effort normal and breath sounds normal. Has no wheezes. Has no rales. Abdominal: Soft.  Bowel sounds are normal. No distension. There is no tenderness. There is no rebound and no guarding. Musculoskeletal: Exhibits no edema. Neurological: The patient is alert and oriented to person, place, and time. Skin: Skin is warm and dry. No rash noted. Not diaphoretic. Psychiatric:  Has a normal mood and affect. Behavior is normal. Judgment and thought content normal      Health Maintenance Due   Topic Date Due    DTaP/Tdap/Td series (1 - Tdap) 11/18/2001    PAP AKA CERVICAL CYTOLOGY  11/18/2001       Assessment and orders:       ICD-10-CM ICD-9-CM    1. Transition of care performed with sharing of clinical summary Z91.89 V15.89    2. Uveitis H20.9 364.3          Plan of care:  Discussed diagnoses in detail with patient. So far, workup for anterior uveitis and ischemic optic neuritis has been negative. Working diagnosis is Lyme uveitis. Continue IV Rocephin until 2/16. Will need to follow up with Dr. Sravanthi Hogue, Neurology, and Ophthalmology/Neuroophthalmology outpatient. .   Medication risks/benefits/side effects discussed with patient. Will order more Zofran, Rx sent to pharmacy. All of the patient's questions were addressed. The patient understands and agrees with our plan of care. The patient knows to call back if they are unsure of or forget any changes we discussed today or if the symptoms change. The patient received an After-Visit Summary which contains VS, orders, medication list and allergy list. This can be used as a \"mini-medical record\" should they have to seek medical care while out of town. Follow-up Disposition:  Return in about 3 weeks (around 2/22/2019).     Future Appointments   Date Time Provider Rachel Mckenna   2/6/2019 To Be Determined AWID [employer] [facility]   2/13/2019 To Be Determined AWID [employer] [facility]   2/19/2019 11:40 AM MD Lucila Ruiz   2/20/2019 To Be Determined [address]ID[address]   2/21/2019 11:15 AM Luiz Mcleod DO [facility]       Pt was discussed with Dr. Terrance Akers (attending physician).     Signed By: Bety Womack MD     February 3, 2019

## 2019-02-04 DIAGNOSIS — G43.111 INTRACTABLE MIGRAINE WITH AURA WITH STATUS MIGRAINOSUS: Primary | ICD-10-CM

## 2019-02-06 ENCOUNTER — HOME CARE VISIT (OUTPATIENT)
Dept: SCHEDULING | Facility: HOME HEALTH | Age: 39
End: 2019-02-06
Payer: MEDICAID

## 2019-02-06 VITALS
TEMPERATURE: 97.9 F | SYSTOLIC BLOOD PRESSURE: 136 MMHG | RESPIRATION RATE: 18 BRPM | DIASTOLIC BLOOD PRESSURE: 86 MMHG | HEART RATE: 88 BPM | OXYGEN SATURATION: 96 %

## 2019-02-06 PROCEDURE — G0299 HHS/HOSPICE OF RN EA 15 MIN: HCPCS

## 2019-02-07 ENCOUNTER — OFFICE VISIT (OUTPATIENT)
Dept: INTERNAL MEDICINE CLINIC | Age: 39
End: 2019-02-07

## 2019-02-07 VITALS
OXYGEN SATURATION: 96 % | RESPIRATION RATE: 14 BRPM | WEIGHT: 222.2 LBS | BODY MASS INDEX: 35.71 KG/M2 | DIASTOLIC BLOOD PRESSURE: 79 MMHG | SYSTOLIC BLOOD PRESSURE: 118 MMHG | HEIGHT: 66 IN | HEART RATE: 59 BPM | TEMPERATURE: 98.3 F

## 2019-02-07 DIAGNOSIS — H22: Primary | ICD-10-CM

## 2019-02-07 DIAGNOSIS — A69.29: Primary | ICD-10-CM

## 2019-02-07 RX ORDER — CEFTRIAXONE 1 G/1
2 INJECTION, POWDER, FOR SOLUTION INTRAMUSCULAR; INTRAVENOUS
COMMUNITY
End: 2019-05-03 | Stop reason: ALTCHOICE

## 2019-02-07 NOTE — LETTER
2/7/2019 10:25 PM 
 
Patient:  Andrez Sin YOB: 1980 Date of Visit: 2/7/2019 Dear No Recipients: I saw  Ms. Francesca France for follow up   Below are the relevant portions of my assessment and plan of care. If you have questions, please do not hesitate to call me.   Sincerely, 
 
 
Agustina Suggs MD

## 2019-02-07 NOTE — PATIENT INSTRUCTIONS
You are here for follow up visit after discharge from the hospital- Your ophthalmologist Rigo Almazan diagnosed  uveitis and your PCP had done Lyme test which was minimally elevated- As there was no other reason for the uveitis after discussing with your ophthalmologist it was decided to treat you as Lyme uveitis- We analysed your CSF in the hospital and it was totlaly normal and you did not have any antibodies for Lyme- Also the blood work that was sent for lyme in the hopsital was also negative. So essentially this indicates you do not have any form of lyme disease and the test run by your PCP was likely a false positive test.You have already received 12 days of IV ceftriaxone ( you could not tolerate PO Doxycycline) .  I will discuss with your ophthalmologist the recent findings as you had seen her after discharge from the hospital- Depending on the conversation we may decide to stop ceftriaxone after 2 more days rather than for 9 more days  The most improtant thing is to find out what is causing ischemic optic neuritis- apparently you have been referred to [facility]

## 2019-02-07 NOTE — PROGRESS NOTES
ID follow-up visit  NAME:  Stacie Mejia                      :   1980                       MRN:   [id number]   Date/Time:  2019 11:05 AM  Subjective:   follow-up after recent hospitalization         Stacie Mejia  is a 45 y.o. -American female was recently in [address] [place] [address] between 2019 until 2019 for possible Lyme uveitis of the right eye and was discharged home on IV ceftriaxone. As per patient she started developing blurred vision of the right eye mid last year and saw Dr. Berta Davila, an ophthalmologist for the first time on 2018. During that visit she had mentioned  pain along with blurring and discharge of the right eye which was going on for nearly a week and also complained of pain with eye movement. I spoke to Dr. Berta Davila and got her records. Her notes mention during that visit the optic disc had no edema was good color with no vascularization. The vitreous was clear. The macula was normal with no edema the cup-to-disc size was 0.8, ,the external eye there was blepharitis, 1+ cell was found in the iris. 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 She wanted her to go to the emergency room and get neurology consult and also get an MRI. Patient had an MRI done on 2018 and that showed a few nonspecific left parietal white matter T2 hyperintensities. The orbits were normal appearing. Patient at that time was being followed by her PCP at [address] [place] [address] . She went back to Dr. Berta Davila on 2018 and she asked her to continue with the prednisone forte drops for her eye and and she asked for the following test to be done to rule out the reason for uveitis including ESR CRP CANDIDA, rheumatoid factor, Lyme titer, ACE level, syphilis serology and HIV antibody testing.   Patient saw Isacc Vega ( neurologist) on 2018 for severe headache on the right frontal and temporal area and also right periorbital pain shooting to the back of the head with blurred vision. He wondered whether the patient had migraine and wanted to give her the Chema protocol and also prescribed Topamax and was considering a lumbar puncture to check for opening pressure if there was no improvement with the Chema protocol. The patient refused lumbar puncture because of scoliosis. Meanwhile she saw a new PCP Dr. Miryam Godfrey of [facility] and she did the autoimmune workup on October 2018 and sent Lyme serology, along with RPR, HIV, ACE on  12/13/ 2018. The Lyme test total antibody came back as 1.02 and the Western blot revealed 6 IgG bands. After discussing with Dr. Tarun Hogue at [place] [address] she started the patient on doxycycline on 26 December and patient took doxycycline for nearly 7-9 days and stopped it because of dizziness and GI symptoms. Her PCP then approached Dr. Tarun Hogue for an alternate treatment and IV ceftriaxone was recommended. There was some difficulty in arranging IV ceftriaxone. Meanwhile patient received 2 courses of the Chema protocol for the severe headaches. The first course that was given last year did not really make any difference to the headache and the second course was taken on the 8th, ninth and 10 January 2019 and that made some difference to the headache. But a few days ago patient started to have more blurring of her vision , some numbness of the right side of the face along with nausea and vomiting and she called her PCP who asked her to go to the emergency department at [facility].  So patient got admitted to [facility] on January 24 and I saw the patient in the hospital for an ID consult.     Patient had constellation of  symptoms.   Blurring of the right eye with impaired vision, headache on the right side of the face , numbness and pinpricks on the right side of the face, weakness on the right hand and at times dropping things, pins and needles on her feet, absent bladder filling sensation  leading to incontinence  if she does not go to the bathroom on a timed basis. For the latter symptoms she has seen urologist many years ago and it was thought that because of 5 children and 4   this could be a consequence of that.     Patient did not have any joint swelling, fever, weight loss, cough, shortness of breath, palpitations. 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  2 years ago when she was at that place she had noted a rash on the left side of her face and neck and a physician at the intermediate had given her 5 days of doxycycline and she responded to the antibiotic. She does not remember any tick bite.     Patient has 5 children but all of them are staying with her mother as her house has  pest infestation. She states in her attic there is raccoon and possum and she has also noted  rats in her house. She has been in this house which is a section 8 housing since 2018.     She has a history of attempted suicide and was at [facility] for nearly 2 months. While she was in [facility] she had a lumbar puncture which revealed 0 WBCs and a protein of 38 and a glucose of 55 the CSF fluid was sent for Lyme antibodies . The  results were not available on discharge. .  I had discussed with her ophthalmologist Dr. Marly Astudillo and as there was no other reason for the uveitis we decided to treat her with IV ceftriaxone for at least a minimum of 2 weeks to 3 weeks and she was discharged home on IV ceftriaxone. Patient is here for follow up after hospital discharge and states she is doing all right except for poor memory . After discharge saw Tammy Contreras who has referred her to [facility] neuro-ophthalmologist for ? ischemic optic neuritis.   .    Past Medical History:   Diagnosis Date    Arthritis     Depression     Headache     Ill-defined condition     scoliosis, gall stones, anemia    Seizures (HCC)       Past Surgical History:   Procedure Laterality Date    HX  SECTION      x 4    HX HERNIA REPAIR      HX TUBAL LIGATION        Social History     Occupational History    Not on file   Tobacco Use    Smoking status: Never Smoker    Smokeless tobacco: Never Used   Substance and Sexual Activity    Alcohol use: No    Drug use: No    Sexual activity: Not Currently   Other Topics Concern    Not on file   Social History Narrative    Not on file      Family History   Problem Relation Age of Onset   Jane He Arthritis-rheumatoid Mother     Depression Father     Cancer Maternal Aunt     Cancer Maternal Uncle     Cancer Paternal Aunt     Cancer Paternal Uncle      Allergies   Allergen Reactions    Other Food Swelling     All nuts except peanuts    Tramadol Nausea and Vomiting    Nut - Unspecified Swelling    [place] Swelling     Swollen lips     Current meds reviewed       REVIEW OF SYSTEMS:   As above  Objective:   VITALS:    Visit Vitals  /79 (BP 1 Location: Left arm, BP Patient Position: Sitting)   Pulse (!) 59   Temp 98.3 °F (36.8 °C) (Oral)   Resp 14   Ht 5' 6\" (1.676 m)   Wt 222 lb 3.2 oz (100.8 kg)   LMP 2019 (Approximate)   SpO2 96%   BMI 35.86 kg/m²       PHYSICAL EXAM:   General:    Alert, cooperative, no distress, appears stated age. Head:   Normocephalic, without obvious abnormality, atraumatic. Eyes:   Conjunctivae clear, anicteric sclerae. Pupils are equal  ENT  Nares normal. No drainage or sinus tenderness. Lips, mucosa, and tongue normal.  No Thrush  Neck:  Supple, symmetrical,  no adenopathy, thyroid: non tender    no carotid bruit and no JVD. Back:    No CVA tenderness. Lungs:   Clear to auscultation bilaterally. No Wheezing or Rhonchi. No rales. Heart:   Regular rate and rhythm,  no murmur, rub or gallop.   Abdomen: Soft, non-tender,not distended. Bowel sounds normal. No masses  Extremities: Right PICC line  Skin:     No rashes or lesions. Or bruising  Lymph: Cervical, supraclavicular normal.  Neurologic: Grossly non-focal    Pertinent Labs  C6 peptide 1/20 7/2019-   CSF Lyme antibodies negative    IMAGING RESULTS:      Impression/Recommendation    68-year-old pleasant -American female who has a history of depression, headaches being treated as migraine right eye anterior uveitis and ischemic optic neuritis was recently admitted to the hospital for starting IV antibiotics and discharged on January 28, 2019 with home IV antibiotics for Lyme uveitis    Patient had a constellation of symptoms which included headache ,numbness on the right side of the face, weakness in the right hand, blurred vision, urinary symptoms, poor memory. All of these were subjective with no objective clinical findings. The symptoms could have fit with migraine but the blurring of the right thigh with clinical findings of anterior uveitis and ischemic optic neuritis as diagnosed by her ophthalmologist Dr. Marly Astudillo did not fit with migraine. The workup for anterior uveitis done by her primary care physician was negative for lupus, sarcoid, syphilis, rheumatoid arthritis and the one test that was positive was Lyme antibody total of 1.02 with 6 IgG Western blot bands. This normally would have been deemed false positive as the total antibody was only 1.02 but in the presence of uveitis and with no other explainable cause, after discussion with the ophthalmologist it was decided to treat her as Lyme uveitis on 1/25/19. she had received doxycycline from her primary care provider in December 2018 but was unable to tolerate and hence was admitted to [facility] and started on IV ceftriaxone. During her admission she had a CSF examination before the ceftriaxone was started.  Opening pressure was 6 cm of water( 60mm) and WBC was zero and protein was 38  and it was normal and the question of CNS Lyme was ruled out. And after her discharge the Lyme antibody in CSF also came back negative and a repeat Lyme blood test also was negative. So this questions the diagnosis of Lyme  and favors a false positive test result of the lab that was done by her PCP on 12/13/18. Even the   5-7 days of Doxy she tried to take from 12/26/18 should not give a negative lyme test ( c6 and EIA) on 1/27/19 . This test was taken before she was started on ceftriaxone     I am concerned that assuming  Lyme as the cause of her eye pain  would be a distraction  and in the process   miss the actual diagnosis. She has already completed nearly 12 days of IV ceftriaxone. Will discuss with her ophthalmologist to see whether the anterior uveitis has resolved       Migraine followed by neurology Dr. Jigna Watson and she is on Trokendi    Ischemic optic neuritis multiple sclerosis was questioned but CSF was normal.  MRI done in September 2018 had shown nonspecific left parietal white matter hyperintensities. She will have to follow-up with her ophthalmologist and neurologist __________________________________________________ Discussed with patient that her symptoms of poor memory and other nonspecific symptoms are not due to Lyme disease and in fact that the diagnosis of Lyme is unlikely  but because there was no other reason for anterior uveitis and 1 of the Lyme test initially was barely positive that we decided to treat her as Lyme uveitis.   I told her that I will discuss with her Ophthalmologist and decide on stopping ceftriaxone on the 14ht day rather than 21 days as planned before

## 2019-02-07 NOTE — PROGRESS NOTES
Health Maintenance Due   Topic Date Due    DTaP/Tdap/Td series (1 - Tdap) 11/18/2001    PAP AKA CERVICAL CYTOLOGY  11/18/2001       Chief Complaint   Patient presents with   [facility] Follow Up     Lyme disease and uveitis with damage to optic nerve       1. Have you been to the ER, urgent care clinic since your last visit? Hospitalized since your last visit? Yes When: 1/24/19 for uveitis and Lyme disease Reason for visit: uveitis and Lyme disease    2. Have you seen or consulted any other health care providers outside of the [address] [place] [address] since your last visit? Include any pap smears or colon screening. No    3) Do you have an Advance Directive on file? no    4) Are you interested in receiving information on Advance Directives? NO      Patient is accompanied by self I have received verbal consent from [address] to discuss any/all medical information while they are present in the room.

## 2019-02-11 ENCOUNTER — DOCUMENTATION ONLY (OUTPATIENT)
Dept: INTERNAL MEDICINE CLINIC | Age: 39
End: 2019-02-11

## 2019-02-11 NOTE — PROGRESS NOTES
Spoke to  (ophthalmologist)on 2/8/19  Spoke to  ( neurologist) on 2/8/19  Reviewed all her labs and , opthal findings  Last visit on 1/31- no uveitis- has optic nerve/disc changes on the rt-D.D glaucoma/ ischemic optic neuritis-she is referring her to [address] [place] [address] neuro opthalmologist   noted uveitis in Aug 2018, in sept it was almost resolved  No anterior uveitis noted  even before Doxy was started in Dec 2018  So patient does not have lyme disease /uveitis - hence Ceftriaxone will be discontinued- she completed 2 weeks.   Informed the patient- will let the home nurse/bioscripts know as well

## 2019-02-12 ENCOUNTER — TELEPHONE (OUTPATIENT)
Dept: FAMILY MEDICINE CLINIC | Age: 39
End: 2019-02-12

## 2019-02-12 NOTE — TELEPHONE ENCOUNTER
Patient asking that Dr. Queta Vila and or nurse call her. Patient is asking if you have spoken with Infectious Disease Doctor? Also asking about removal of PIC Line?       Call [phone] or [phone]    thanks

## 2019-02-13 ENCOUNTER — HOME CARE VISIT (OUTPATIENT)
Dept: SCHEDULING | Facility: HOME HEALTH | Age: 39
End: 2019-02-13
Payer: MEDICAID

## 2019-02-13 VITALS
HEART RATE: 67 BPM | SYSTOLIC BLOOD PRESSURE: 151 MMHG | OXYGEN SATURATION: 98 % | OXYGEN SATURATION: 96 % | DIASTOLIC BLOOD PRESSURE: 81 MMHG | TEMPERATURE: 96.9 F | TEMPERATURE: 96.8 F | RESPIRATION RATE: 18 BRPM | DIASTOLIC BLOOD PRESSURE: 89 MMHG | SYSTOLIC BLOOD PRESSURE: 137 MMHG | HEART RATE: 72 BPM | RESPIRATION RATE: 18 BRPM

## 2019-02-13 PROCEDURE — G0299 HHS/HOSPICE OF RN EA 15 MIN: HCPCS

## 2019-02-14 NOTE — TELEPHONE ENCOUNTER
2/13/2019 8:04 PM    Spoke with ID earlier who stated that patient had received complete tx of abx and PICC can be pulled. Wrote order and had RN fax to home health. Called patient to let her know of plan however she did not answer and hung up before I could leave a message. Will follow up in office with me next week. If patient calls back please read her the following message:  \"I spoke with the Infectious Disease doctor who stated you have completed your course of antibiotics. They recommended your PICC line be pulled. We faxed an order to the home health agency for them to do this. Please follow up as scheduled next week with Dr. Cristino Finn. \"     Brayden Randolph, DO

## 2019-02-17 ENCOUNTER — HOME CARE VISIT (OUTPATIENT)
Dept: SCHEDULING | Facility: HOME HEALTH | Age: 39
End: 2019-02-17
Payer: MEDICAID

## 2019-02-17 PROCEDURE — G0299 HHS/HOSPICE OF RN EA 15 MIN: HCPCS

## 2019-02-18 VITALS
HEART RATE: 78 BPM | DIASTOLIC BLOOD PRESSURE: 78 MMHG | OXYGEN SATURATION: 97 % | TEMPERATURE: 98.3 F | RESPIRATION RATE: 20 BRPM | SYSTOLIC BLOOD PRESSURE: 122 MMHG

## 2019-02-19 ENCOUNTER — OFFICE VISIT (OUTPATIENT)
Dept: NEUROLOGY | Age: 39
End: 2019-02-19

## 2019-02-19 VITALS
SYSTOLIC BLOOD PRESSURE: 95 MMHG | OXYGEN SATURATION: 98 % | DIASTOLIC BLOOD PRESSURE: 60 MMHG | WEIGHT: 226 LBS | BODY MASS INDEX: 36.32 KG/M2 | HEIGHT: 66 IN | HEART RATE: 85 BPM | RESPIRATION RATE: 16 BRPM

## 2019-02-19 DIAGNOSIS — G43.111 INTRACTABLE MIGRAINE WITH AURA WITH STATUS MIGRAINOSUS: Primary | ICD-10-CM

## 2019-02-19 NOTE — PROGRESS NOTES
Neurology Progress Note    Patient ID:  Mariam Smith  [id number]  9 y.o.  1980      Subjective:   History:  Halley Lazo a 40 y. o. female who  has a past medical history of Arthritis; Depression; Headache; and Seizures ([facility]). who in Sept 2018, noted blurred vision of the R eye, described as there is a film in the eyes and severe headache R frontal and R temple area, R periorbital, shooting to back of head, occurring every day, throbbing, constant, 8/10, no aggravating/relivieng factor with nausea and photophobia. Considerations include migraine, with visual auras, intractable, hemicrania continua and pseudotumor cerebri. Patient saw Dr Valarie Ortega (ophtalmologist) who said everything was fine. Patient was placed on Propranolol and Wellbutrin with no clear benefit. Patient was seen at ER with MRI brain showing non-specific white matter changes.     Patient is now on Topamax 50 mg QHS with no more headache, just some pressure. No side effects.     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 Had recent spinal tap with opening pressure of 6. ROS:  Per HPI-  Otherwise the remainder of ROS was negative    Social Hx  Social History     Socioeconomic History    Marital status: SINGLE     Spouse name: Not on file    Number of children: Not on file    Years of education: Not on file    Highest education level: Not on file   Tobacco Use    Smoking status: Never Smoker    Smokeless tobacco: Never Used   Substance and Sexual Activity    Alcohol use: No    Drug use: No    Sexual activity: Not Currently       Meds:  Current Outpatient Medications on File Prior to Visit   Medication Sig Dispense Refill    topiramate ER (TROKENDI XR) 50 mg capsule Take 1 Cap by mouth nightly. 30 Cap 5    ondansetron (ZOFRAN ODT) 4 mg disintegrating tablet Take 1 Tab by mouth every eight (8) hours as needed for Nausea.  12 Tab 0    indomethacin (INDOCIN) 50 mg capsule Take 50 mg by mouth three (3) times daily as needed (HEADACHE).  triamcinolone acetonide (KENALOG) 0.147 mg/gram aero topical spray       clobetasol (TEMOVATE) 0.05 % ointment       lidocaine-tetracaine 7-7 % crea       nystatin (MYCOSTATIN) 100,000 unit/gram ointment Apply  to affected area two (2) times a day. 15 g 0    nystatin (MYCOSTATIN) powder Apply  to affected area four (4) times daily. 1 Bottle 1    albuterol (PROVENTIL VENTOLIN) 2.5 mg /3 mL (0.083 %) nebulizer solution by Nebulization route every four (4) hours as needed.  buPROPion SR (WELLBUTRIN SR) 100 mg SR tablet Take 100 mg by mouth two (2) times a day. 1    propranolol (INDERAL) 10 mg tablet Take 10 mg by mouth nightly as needed. 4    QVAR REDIHALER 40 mcg/actuation HFAb inhaler Take 40 mcg by inhalation two (2) times a day. 1    hydrocortisone (HYTONE) 2.5 % topical cream Apply  to affected area two (2) times a day. 30 g 0    sodium chloride (NORMAL SALINE FLUSH) 5-10 mL by IntraVENous route daily. before and after IV abx administration      heparin, porcine, pf (HEPARIN LOCKFLUSH,PORCINE,,PF,) 10 unit/mL injection 10 Units by IntraVENous route daily. administer after ABX, NS      cefTRIAXone (ROCEPHIN) 1 gram injection 2 g by IntraVENous route. No current facility-administered medications on file prior to visit. Imaging:    CT Results (recent):  Results from Hospital Encounter encounter on 01/24/19   CT HEAD WO CONT    Narrative EXAM: CT HEAD WO CONT    INDICATION: headache    COMPARISON: 10/16/2016. CONTRAST: None. TECHNIQUE: Unenhanced CT of the head was performed using 5 mm images. Brain and  bone windows were generated. CT dose reduction was achieved through use of a  standardized protocol tailored for this examination and automatic exposure  control for dose modulation. FINDINGS:  The ventricles and sulci are normal in size, shape and configuration and  midline.  There is no significant white matter disease. There is no intracranial  hemorrhage, extra-axial collection, mass, mass effect or midline shift. The  basilar cisterns are open. No acute infarct is identified. The bone windows  demonstrate no abnormalities. The visualized portions of the paranasal sinuses  and mastoid air cells are clear. Impression IMPRESSION: No significant change or acute finding           MRI Results (recent):  Results from Hospital Encounter encounter on 09/14/18   MRI BRAIN W WO CONT    Narrative EXAM: MRI BRAIN     INDICATION:   Include scan of orbits bilaterally. Rule out MS and Optic  Neuritis per Ophthalmology   Vision loss     SEQUENCES:   Sagittal T1, axial T1, T2, GRE and FLAIR; axial and coronal T1 post  enhancement; and diffusion imaging of the brain. 19 mL of Dotarem. Thin section  imaging is also performed through the orbits with and without contrast.    FINDINGS:     Orbital soft tissues, optic nerves and chiasm are normal in size, morphology,  signal intensity and enhancement. There are a few punctate white matter foci of T2 hyperintensity in the left  parietal lobe, nonspecific. Remainder of the brain including corpus callosum and  pericallosal white matter show normal signal intensity. The enhancement is  normal.      There is no evidence for mass, hemorrhage, shift, or hydrocephalus. There is no  extra-axial fluid collection. Diffusion imaging shows no diffusion restriction  to indicate acute infarct/ischemia or other process. Impression IMPRESSION: Few nonspecific left parietal white matter T2 hyperintensities. Normal appearance of the orbits. Normal diffusion and enhancement. IR Results (recent):  No results found for this or any previous visit.     VAS/US Results (recent):  Results from [place] encounter on 10/31/18   DUPLEX LOWER EXT VENOUS RIGHT       Reviewed records in [place] and media tab today    Lab Review     Admission on 01/24/2019, Discharged on 01/28/2019   Component Date Value Ref Range Status    WBC 01/24/2019 7.4  3.6 - 11.0 K/uL Final    Comment: Due to mathematical rounding between the [address], and the new [employer] [facility] analyzers, the reported automated differential may vary by up to +/- 0.5% per cell line. This finding may produce a result that is 100% +/- 3%, which is clinically insignificant.  RBC 01/24/2019 4.22  3.80 - 5.20 M/uL Final    HGB 01/24/2019 11.9  11.5 - 16.0 g/dL Final    HCT 01/24/2019 39.2  35.0 - 47.0 % Final    MCV 01/24/2019 92.9  80.0 - 99.0 FL Final    MCH 01/24/2019 28.2  26.0 - 34.0 PG Final    MCHC 01/24/2019 30.4  30.0 - 36.5 g/dL Final    RDW 01/24/2019 14.7* 11.5 - 14.5 % Final    PLATELET 72/28/8691 409  150 - 400 K/uL Final    MPV 01/24/2019 10.8  8.9 - 12.9 FL Final    NRBC 01/24/2019 0.0  0  WBC Final    ABSOLUTE NRBC 01/24/2019 0.00  0.00 - 0.01 K/uL Final    NEUTROPHILS 01/24/2019 55  32 - 75 % Final    LYMPHOCYTES 01/24/2019 31  12 - 49 % Final    MONOCYTES 01/24/2019 9  5 - 13 % Final    EOSINOPHILS 01/24/2019 5  0 - 7 % Final    BASOPHILS 01/24/2019 0  0 - 1 % Final    IMMATURE GRANULOCYTES 01/24/2019 0  0.0 - 0.5 % Final    ABS. NEUTROPHILS 01/24/2019 4.1  1.8 - 8.0 K/UL Final    ABS. LYMPHOCYTES 01/24/2019 2.3  0.8 - 3.5 K/UL Final    ABS. MONOCYTES 01/24/2019 0.7  0.0 - 1.0 K/UL Final    ABS. EOSINOPHILS 01/24/2019 0.4  0.0 - 0.4 K/UL Final    ABS. BASOPHILS 01/24/2019 0.0  0.0 - 0.1 K/UL Final    ABS. IMM.  GRANS. 01/24/2019 0.0  0.00 - 0.04 K/UL Final    DF 01/24/2019 AUTOMATED    Final    Sodium 01/24/2019 142  136 - 145 mmol/L Final    Potassium 01/24/2019 3.9  3.5 - 5.1 mmol/L Final    Chloride 01/24/2019 114* 97 - 108 mmol/L Final    CO2 01/24/2019 22  21 - 32 mmol/L Final    Anion gap 01/24/2019 6  5 - 15 mmol/L Final    Glucose 01/24/2019 86  65 - 100 mg/dL Final    BUN 01/24/2019 11  6 - 20 MG/DL Final    Creatinine 01/24/2019 0.83  0.55 - 1.02 MG/DL Final    BUN/Creatinine ratio 01/24/2019 13  12 - 20   Final    GFR est AA 01/24/2019 >60  >60 ml/min/1.73m2 Final    GFR est non-AA 01/24/2019 >60  >60 ml/min/1.73m2 Final    Comment: Estimated GFR is calculated using the IDMS-traceable Modification of Diet in Renal Disease (MDRD) Study equation, reported for both  Americans (GFRAA) and non- Americans (GFRNA), and normalized to 1.73m2 body surface area. The physician must decide which value applies to the patient. The MDRD study equation should only be used in individuals age 25 or older. It has not been validated for the following: pregnant women, patients with serious comorbid conditions, or on certain medications, or persons with extremes of body size, muscle mass, or nutritional status.  Calcium 01/24/2019 8.0* 8.5 - 10.1 MG/DL Final    Bilirubin, total 01/24/2019 0.2  0.2 - 1.0 MG/DL Final    ALT (SGPT) 01/24/2019 27  12 - 78 U/L Final    AST (SGOT) 01/24/2019 14* 15 - 37 U/L Final    Alk. phosphatase 01/24/2019 72  45 - 117 U/L Final    Protein, total 01/24/2019 7.1  6.4 - 8.2 g/dL Final    Albumin 01/24/2019 3.1* 3.5 - 5.0 g/dL Final    Globulin 01/24/2019 4.0  2.0 - 4.0 g/dL Final    A-G Ratio 01/24/2019 0.8* 1.1 - 2.2   Final    SAMPLES BEING HELD 01/24/2019 [facility]   Final    COMMENT 01/24/2019 Add-on orders for these samples will be processed based on acceptable specimen integrity and analyte stability, which may vary by analyte. Final    WBC 01/25/2019 6.6  3.6 - 11.0 K/uL Final    Comment: Due to mathematical rounding between the [address], and the new [employer] analyzers, the reported automated differential may vary by up to +/- 0.5% per cell line. This finding may produce a result that is 100% +/- 3%, which is clinically insignificant.       RBC 01/25/2019 3.74* 3.80 - 5.20 M/uL Final    HGB 01/25/2019 10.7* 11.5 - 16.0 g/dL Final    HCT 01/25/2019 34.0* 35.0 - 47.0 % Final    MCV 01/25/2019 90.9  80.0 - 99.0 FL Final    MCH 01/25/2019 28.6  26.0 - 34.0 PG Final    MCHC 01/25/2019 31.5  30.0 - 36.5 g/dL Final    RDW 01/25/2019 14.6* 11.5 - 14.5 % Final    PLATELET 22/74/1049 504  150 - 400 K/uL Final    MPV 01/25/2019 9.9  8.9 - 12.9 FL Final    NRBC 01/25/2019 0.0  0  WBC Final    ABSOLUTE NRBC 01/25/2019 0.00  0.00 - 0.01 K/uL Final    Sodium 01/25/2019 141  136 - 145 mmol/L Final    Potassium 01/25/2019 3.7  3.5 - 5.1 mmol/L Final    Chloride 01/25/2019 113* 97 - 108 mmol/L Final    CO2 01/25/2019 21  21 - 32 mmol/L Final    Anion gap 01/25/2019 7  5 - 15 mmol/L Final    Glucose 01/25/2019 107* 65 - 100 mg/dL Final    BUN 01/25/2019 11  6 - 20 MG/DL Final    Creatinine 01/25/2019 0.87  0.55 - 1.02 MG/DL Final    BUN/Creatinine ratio 01/25/2019 13  12 - 20   Final    GFR est AA 01/25/2019 >60  >60 ml/min/1.73m2 Final    GFR est non-AA 01/25/2019 >60  >60 ml/min/1.73m2 Final    Calcium 01/25/2019 8.1* 8.5 - 10.1 MG/DL Final    IgG, Quant, CSF 01/26/2019 3.4  0.0 - 8.6 mg/dL Final    Albumin, CSF 01/26/2019 17  11 - 48 mg/dL Final    Albumin, serum 01/26/2019 3.5  3.5 - 5.5 g/dL Final    Comment: (NOTE)  Performed At: [address] [id number]  Kimberly Bowen MD UX:[id number]      Immunoglobulin G, Qt. 01/26/2019 1,437  700 - 1,600 mg/dL Final    Comment: (NOTE)  Performed At: [address] [id number]  Kimberly Bowen MD FK:[id number]      IgG/Alb ratio, CSF 01/26/2019 0.20  0.00 - 0.25   Final    CSF IgG Index 01/26/2019 0.5  0.0 - 0.7   Final    IgG Synthesis Rate, CSF 01/26/2019 SEE COMMENT  NEG 9.9 TO +3.3 mg/day Final    Comment: (NOTE)  RESULT:-4.0      Oligoclonal Bands 01/26/2019 Comment    Final    Comment: (NOTE)  Zero (0) oligoclonal bands were observed in the CSF.   Interpretation:  Criteria for Positivity: Four (4) or more oligoclonal bands  observed  only in the CSF have been shown to be most consistent with  MS  using our method. [Adithya AS, Sung EL, Miriam BG, and  Dioni JA: Cerebrospinal Fluid Oligoclonal Bands in the  Diagnosis  of Multiple Sclerosis. Am J Clin Pathol 120(5):672-675,  2003]. Oligoclonal bands that are present only in the CSF have been  associated with a variety of inflammatory brain diseases such as  multiple sclerosis (MS), subacute encephalitis, neurosyphilis,  etc.  Increased IgG in the CSF is not specific for MS, but is an  indication  of chronic neural inflammation. Clinical correlation  indicated. Approximately 2-3% of clinically confirmed MS patients show little  or no evidence of oligoclonal bands in the CSF; however  oligoclonal  bands may develop as the disease progresses. Oligoclonal Banding testing performed using Isoelectric Focusing  (                           IEF) and immunoblotting methodology. Performed At: [address] [id number]  Francisco Garrison MD [id number]      CSF/Serum Alb.  Index 2019 5  0 - 8   Final    Comment: (NOTE)          Relationship to blood-brain barrier:           Consistent with an intact barrier        <9           Slight impairment                   9 -  14           Moderate impairment                14 -  30           Severe impairment                  30 - 100           Complete breakdown                     >100  Performed At: [address] [id number]  Francisco Garrison MD O      CSF TUBE NO. 2019 3    Final    CSF COLOR 2019 COLORLESS  COL   Final    CSF APPEARANCE 2019 CLEAR  CLEAR   Final    CSF RBCS 2019 6* 0 /cu mm Final    CSF WBCS 2019 0  0 - 5 /cu mm Final    Tube No. 2019 1    Final    Glucose,CSF 2019 55  40 - 70 MG/DL Final    Tube No. 2019 1    Final    Protein,CSF 01/26/2019 38  15 - 45 MG/DL Final    P93 Ab, IgG 01/26/2019 Absent   Final    P66 Ab, IgG 01/26/2019 Absent   Final    P58 Ab, IgG 01/26/2019 Absent   Final    P45 Ab, IgG 01/26/2019 Absent   Final    P41 Ab, IgG 01/26/2019 Absent   Final    P39 Ab, IgG 01/26/2019 Absent   Final    P30 Ab, IgG 01/26/2019 Absent   Final    P28 Ab, IgG 01/26/2019 Absent   Final    P23 Ab, IgG 01/26/2019 Absent   Final    P18 Ab, IgG 01/26/2019 Absent   Final    Lyme IgG WB Interp. 01/26/2019 NEGATIVE     Final    Comment: (NOTE)  Positive: 5 of the following bands: Borrelia-specific           bands: O22,10,47,12,01,18,52,23,60, and 93. Negative: No bands or banding patterns which do not meet           positive criteria. Note: For investigational use only.  P41 Ab, IgM 01/26/2019 Absent   Final    P39 Ab, IgM 01/26/2019 Absent   Final    P23 Ab, IgM 01/26/2019 Absent   Final    Lyme IgM WB Interp. 01/26/2019 NEGATIVE     Final    Comment: (NOTE)  Note: An equivocal or positive EIA result followed by a negative  Western Blot result is considered NEGATIVE. An equivocal or  positive EIA result followed by a positive Western Blot is considered  POSITIVE by the CDC. Positive: 2 of the following bands: 23,39 or 41  Negative: No bands or banding patterns which do not meet positive  criteria. Criteria for positivity are those recommended by CDC/ASTPHLD.  p23=Osp C, n44=byovqbiab  Criteria for positivity are those recommended by CDC/ASTPHLD.  p23=Osp C, u99=qdvtzxjiq  Performed At: [address] [id number]  Marciano Meza MD :[id number]     [facility] 01/26/2019 TUBE 4 OF CSF(2cc)   Final    COMMENT 01/26/2019 Add-on orders for these samples will be processed based on acceptable specimen integrity and analyte stability, which may vary by analyte.     Final    B burgdorferi C6 Peptide 01/27/2019 <0.91  0.00 - 0.90 index Final    Comment: (NOTE) Negative         <0.91                                 Equivocal  0.91 - 1.09                                 Positive         >1.09  Performed At: [facility]  [place] [address] [id number]  Bella Casper MD XX:[id number]      Lyme Ab, IgG/IgM 01/27/2019 <0.91  0.00 - 0.90 ISR Final    Comment: (NOTE)                                 Negative         <0.91                                 Equivocal  0.91 - 1.09                                 Positive         >1.09  Performed At: [facility]  [place] [address] [id number]  Bella Casper MD WB:[id number]     Orders Only on 12/13/2018   Component Date Value Ref Range Status    WBC 12/13/2018 5.2  3.4 - 10.8 x10E3/uL Final    RBC 12/13/2018 3.98  3.77 - 5.28 x10E6/uL Final    HGB 12/13/2018 11.2  11.1 - 15.9 g/dL Final    HCT 12/13/2018 34.8  34.0 - 46.6 % Final    MCV 12/13/2018 87  79 - 97 fL Final    MCH 12/13/2018 28.1  26.6 - 33.0 pg Final    MCHC 12/13/2018 32.2  31.5 - 35.7 g/dL Final    RDW 12/13/2018 15.2  12.3 - 15.4 % Final    PLATELET 50/78/3160 647  150 - 379 x10E3/uL Final    NEUTROPHILS 12/13/2018 60  Not Estab. % Final    Lymphocytes 12/13/2018 31  Not Estab. % Final    MONOCYTES 12/13/2018 6  Not Estab. % Final    EOSINOPHILS 12/13/2018 3  Not Estab. % Final    BASOPHILS 12/13/2018 0  Not Estab. % Final    ABS. NEUTROPHILS 12/13/2018 3.1  1.4 - 7.0 x10E3/uL Final    Abs Lymphocytes 12/13/2018 1.6  0.7 - 3.1 x10E3/uL Final    ABS. MONOCYTES 12/13/2018 0.3  0.1 - 0.9 x10E3/uL Final    ABS. EOSINOPHILS 12/13/2018 0.2  0.0 - 0.4 x10E3/uL Final    ABS. BASOPHILS 12/13/2018 0.0  0.0 - 0.2 x10E3/uL Final    IMMATURE GRANULOCYTES 12/13/2018 0  Not Estab. % Final    ABS. IMM. GRANS.  12/13/2018 0.0  0.0 - 0.1 x10E3/uL Final    Lyme Ab, IgG/IgM 12/13/2018 1.02* 0.00 - 0.90 ISR Final    Comment:                                 Negative         <0.91 Equivocal  0.91 - 1.09                                  Positive         >1.09      Lyme Disease Ab, IgM, QT 12/13/2018 <0.80  0.00 - 0.79 index Final    Comment:                                 Negative         <0.80                                  Equivocal  0.80 - 1.19                                  Positive         >1.19   IgM levels may peak at 3-6 weeks post infection, then   gradually decline.  IgG P93 Ab 12/13/2018 Present*  Final    IgG P66 Ab 12/13/2018 Absent   Final    IgG P58 Ab 12/13/2018 Present*  Final    IgG P45 Ab 12/13/2018 Absent   Final    IgG P41 Ab 12/13/2018 Present*  Final    IgG P39 Ab 12/13/2018 Present*  Final    IgG P30 Ab 12/13/2018 Absent   Final    IgG P28 Ab 12/13/2018 Absent   Final    IgG P23 Ab 12/13/2018 Present*  Final    IgG P18 Ab 12/13/2018 Present*  Final    Lyme Ab, IgG WB Interp. 12/13/2018 Positive*  Final    Comment:                      Positive: 5 of the following                                 Borrelia-specific bands:                                 18,23,28,30,39,41,45,58,                                 66, and 93. Negative: No bands or banding                                 patterns which do not                                 meet positive criteria.  IgM P41 Ab 12/13/2018 Absent   Final    IgM P39 Ab 12/13/2018 Absent   Final    IgM P23 Ab 12/13/2018 Present*  Final    Lyme Ab, IgM WB Interp. 12/13/2018 Negative   Final    Comment: Note: An equivocal or positive EIA result followed by a negative  Western Blot result is considered NEGATIVE. An equivocal or positive  EIA result followed by a positive Western Blot is considered POSITIVE  by the CDC. Positive: 2 of the following bands: 23,39 or 41  Negative: No bands or banding patterns which do not meet positive  criteria.   Criteria for positivity are those recommended by CDC/ASTPHLD.  p23=Osp C, v70=brlzjxwee  Note:  Sera from individuals with the following may cross react in the  Lyme Western Blot assays: other spirochetal diseases (periodontal  disease, leptospirosis, relapsing fever, yaws, and pinta);  connective autoimmune (Rheumatoid Arthritis and Systemic Lupus  Erythematosus and also individuals with Antinuclear Antibody);  other infections COFF[facility] Spotted Fever; Eber-Barr Virus,  and Cytomegalovirus).  Non-Treponemal Screening VDRL 12/13/2018 Normal   Final    Comment: Reference Range:  Qualitative test  Non-reactive: Normal  Reactive: Abnormal  The venereal disease research laboratory (VDRL) tests  measure the host's antibody response to non treponemal  antigens and hence they lack specificity. Therefore, a  positive result by VDRL requires confirmation by a  treponemal-specific test, such as the fluorescent treponemal  antibody-absorbed, TERRI or microhemagglutination assay. *This test has been developed and performance parameters  have been validated by Pat Reich. This test has  not been approved by the U.S. Food and Drug Administration  (FDA); however, US FDA approval is not required for clinical  use. It is not intended that clinical diagnosis and patient  management decisions be made using these results alone. This test has been validated using serum samples. The   has not determined the efficacy of this test  when performed on CSF, plasma, joint or pleural fluid  specimens.  The performance characteristics of this mere                           t were  determined by Ricky Grant Rheumatoid factor 12/13/2018 <10.0  0.0 - 13.9 IU/mL Final    Angiotensin Converting Enzyme (ACE) 12/13/2018 47  14 - 82 U/L Final    HIV SCREEN 4TH GENERATION WRFX 12/13/2018 Non Reactive  Non Reactive Final    C-Reactive Protein, Cardiac 12/13/2018 2.93  0.00 - 3.00 mg/L Final    Comment:          Relative Risk for Future Cardiovascular Event                               Low                 <1.00 Average       1.00 - 3.00                               High                >3.00      Sed rate (ESR) 12/13/2018 25  0 - 32 mm/hr Final    T. pallidum Ab (FTA-Ab) 12/13/2018 Non Reactive  Non Reactive Final         Objective:       Exam:  Visit Vitals  BP 95/60 (BP 1 Location: Right arm, BP Patient Position: Sitting)   Pulse 85   Resp 16   Ht 5' 6\" (1.676 m)   Wt 102.5 kg (226 lb)   SpO2 98%   BMI 36.48 kg/m²     Gen: Awake, alert, follows commands  Appropriate appearance, normal speech. Oriented to all spheres. No visual field defect on confrontation exam.  Full eyes movement, with no nystagmus, no diplopia, no ptosis. Normal gag and swallow. All remaining cranial nerves were normal  Motor function: 5/5 in all extremities  Sensory: intact to LT, PP and JPS  Good FTN and HTS   Gait: Normal    Assessment:       ICD-10-CM ICD-9-CM    1. Intractable migraine with aura with status migrainosus G43. 111 346.03            Plan:   1. I had a long discussion with patient and family. Her responding well to Topamax and spinal opening pressure of 6 make pseudotumor cerebri less likely and more likely that her headaches are migraine  2. Continue Topamax 50 mg QHS as headache preventative  3. Continue headache diary and list that can trigger headache  4. 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           Jared Argueta MD  Diplomate, American Board of Psychiatry and Neurology  Diplomate, Neuromuscular Medicine  Diplomate, American Board of Electrodiagnostic Medicine

## 2019-02-19 NOTE — LETTER
2/19/2019 12:34 PM 
 
Patient:  Emmet Dakin YOB: 1980 Date of Visit: 2/19/2019 Dear Ishan De Santiago, [address] [facility] [id number] VIA In Basket 
 : Thank you for referring Ms. aSmantha Doll to me for evaluation/treatment. Below are the relevant portions of my assessment and plan of care. If you have questions, please do not hesitate to call me. I look forward to following Ms. Shankar [place] along with you. Sincerely, Adriano Metzger MD

## 2019-02-28 ENCOUNTER — HOSPITAL ENCOUNTER (EMERGENCY)
Age: 39
Discharge: HOME | End: 2019-02-28
Attending: EMERGENCY MEDICINE
Payer: MEDICAID

## 2019-02-28 ENCOUNTER — APPOINTMENT (OUTPATIENT)
Dept: CT IMAGING | Age: 39
End: 2019-02-28
Attending: EMERGENCY MEDICINE
Payer: MEDICAID

## 2019-02-28 VITALS
OXYGEN SATURATION: 99 % | TEMPERATURE: 98.9 F | SYSTOLIC BLOOD PRESSURE: 114 MMHG | BODY MASS INDEX: 36.48 KG/M2 | HEART RATE: 75 BPM | WEIGHT: 226 LBS | RESPIRATION RATE: 16 BRPM | DIASTOLIC BLOOD PRESSURE: 71 MMHG

## 2019-02-28 DIAGNOSIS — R20.2 TINGLING OF FACE: Primary | ICD-10-CM

## 2019-02-28 PROCEDURE — 74011000258 HC RX REV CODE- 258: Performed by: NURSE PRACTITIONER

## 2019-02-28 PROCEDURE — 99283 EMERGENCY DEPT VISIT LOW MDM: CPT

## 2019-02-28 PROCEDURE — 96375 TX/PRO/DX INJ NEW DRUG ADDON: CPT

## 2019-02-28 PROCEDURE — 96367 TX/PROPH/DG ADDL SEQ IV INF: CPT

## 2019-02-28 PROCEDURE — 96368 THER/DIAG CONCURRENT INF: CPT

## 2019-02-28 PROCEDURE — 74011250636 HC RX REV CODE- 250/636: Performed by: NURSE PRACTITIONER

## 2019-02-28 PROCEDURE — 96365 THER/PROPH/DIAG IV INF INIT: CPT

## 2019-02-28 PROCEDURE — 70450 CT HEAD/BRAIN W/O DYE: CPT

## 2019-02-28 PROCEDURE — 96366 THER/PROPH/DIAG IV INF ADDON: CPT

## 2019-02-28 PROCEDURE — 74011000250 HC RX REV CODE- 250: Performed by: NURSE PRACTITIONER

## 2019-02-28 RX ORDER — MAGNESIUM SULFATE HEPTAHYDRATE 40 MG/ML
2 INJECTION, SOLUTION INTRAVENOUS ONCE
Status: COMPLETED | OUTPATIENT
Start: 2019-02-28 | End: 2019-02-28

## 2019-02-28 RX ORDER — KETOROLAC TROMETHAMINE 30 MG/ML
30 INJECTION, SOLUTION INTRAMUSCULAR; INTRAVENOUS
Status: COMPLETED | OUTPATIENT
Start: 2019-02-28 | End: 2019-02-28

## 2019-02-28 RX ADMIN — KETOROLAC TROMETHAMINE 30 MG: 30 INJECTION, SOLUTION INTRAMUSCULAR; INTRAVENOUS at 16:44

## 2019-02-28 RX ADMIN — VALPROATE SODIUM 1000 MG: 100 INJECTION, SOLUTION INTRAVENOUS at 17:32

## 2019-02-28 RX ADMIN — MAGNESIUM SULFATE HEPTAHYDRATE 2 G: 40 INJECTION, SOLUTION INTRAVENOUS at 17:52

## 2019-02-28 RX ADMIN — SODIUM CHLORIDE 1000 MG: 900 INJECTION, SOLUTION INTRAVENOUS at 16:36

## 2019-02-28 NOTE — CONSULTS
KIM SECOURS: [id number] [address] Neurology  [place] [address]  [phone]        Name:   Nena Has record #: [id number]  Admission Date: 2/28/2019   Who Consulted: Dr. Sapna Salcido  Reason for Consult:  Facial tingling    HISTORY OF PRESENT ILLNESS:   This is a 45 y.o. female with  has a past medical history of Arthritis, Depression, Headache, Ill-defined condition, and Seizures ([address] who is admitted for right sided tingling. The Neurology Service is asked to evaluate for the same complaint. Ms. Bret Hickey presnted to the ED with right sided tingling. Pt states she was treated for Lyme disease with Rocephin via PICC line for the past 2 months. Today, she reports sudden onset tingling to the right side of her face and right arm at ~9:00AM this morning. She has never had anything similar before. She denies associated visual changes. She reports photophobia and headaches in the setting of recent acute optic neuritis to the right eye. She is followed in our clinic by Dr. Demetrio Ayoub for migraine and was last seen on 2/19/2019. Clinical Data:  Imaging review:     CT Head: No acute process      Current rhythm:    Assessment/Plan:   1. Complex migraine:    · 1 gm solumedrol, 1 gm Depakote, 30 mg toradol, 2 gms Mg  · May be discharged if symptoms improve  · Follow up with Dr. Demetrio Ayoub    2  Thank you for allowing the Neurology Service the pleasure of participating in the care of your patient. This patient will be discussed with my collaborating care team physician Dr. Demetrio Ayoub and he may have further recommendations regarding this patient's care. Review of Systems: 10 point ROS was performed. Pertinent positives listed in HPI. Negative ROS is as follows.   Pt denies: angina, palpitations, paresthesias, weakness, vision loss, slurred speech, aphasia, confusion, fever, chills, falls, headache, diplopia, back pain, neck pain, prior episodes of vertigo, hallucinations, new medications or dosage changes. PHYSICAL EXAM:     Visit Vitals  /76 (BP 1 Location: Right arm, BP Patient Position: At rest)   Pulse 61   Temp 98.9 °F (37.2 °C)   Resp 14   Wt 102.5 kg (226 lb)   LMP 2019 (Approximate)   SpO2 100%   BMI 36.48 kg/m²      O2 Device: Room air    Temp (24hrs), Av.9 °F (37.2 °C), Min:98.9 °F (37.2 °C), Max:98.9 °F (37.2 °C)    No intake/output data recorded. No intake/output data recorded. General:  Alert, cooperative, no acute distress. Lungs:   Clear to auscultation bilaterally. No crackles/wheezes. Heart:  Abdominal:  Regular rate and rhythm, No murmur, click, rub or gallop. Soft and nondistended   Skin: Skin color, texture, turgor normal.      NEUROLOGICAL EXAM:    Appearance:  Well developed, well nourished,  and is n no acute distress. Mental Status: Oriented to time, place and person. Fully attentive. No aphasia. Full fund of knowledge. Normal recent and remote memory. Cranial Nerves:   Intact visual fields. PERRL, EOM's full, no nystagmus, no ptosis. Facial sensation is normal. Facial movement is symmetric. Palate is midline. Normal sternocleidomastoid strength. Tongue is midline. Reflexes:   Deep tendon reflexes 2+/4 and symmetrical.   Sensory:   Decreased sensation to right face and right hand. Gait:  Normal gait. Tremor:   No tremor noted. Cerebellar:  No cerebellar signs present. Neurovascular:  Normal heart sounds and regular rhythm. No carotid bruits. Motor: No pronator drift of either outstretched arm.          Deltoid Biceps Triceps Wrist Extension Finger Abduction   L 5 5 5 5 5   R 5 5 5 5 5      Hip Flexion Hip Extension Knee Flexion Knee Extension Ankle Dorsiflexion Ankle Plantarflexion   L 5 5 5 5 5 5   R 5 5 5 5 5 5        Reflexes:     Biceps Triceps Plantar Patellar Achilles   L 2 2 2 2 2   R 2 2 2 2 2     Past Medical History:   Diagnosis Date    Arthritis     Depression     Headache     Ill-defined condition     scoliosis, gall stones, anemia    Seizures (HCC)      Past Surgical History:   Procedure Laterality Date    HX  SECTION      x 4    HX HERNIA REPAIR      HX TUBAL LIGATION       Family History   Problem Relation Age of Onset    Arthritis-rheumatoid Mother     Depression Father     Cancer Maternal Aunt     Cancer Maternal Uncle     Cancer Paternal Aunt     Cancer Paternal Uncle      Social History     Socioeconomic History    Marital status: SINGLE     Spouse name: Not on file    Number of children: Not on file    Years of education: Not on file    Highest education level: Not on file   Social Needs    Financial resource strain: Not on file    Food insecurity - worry: Not on file    Food insecurity - inability: Not on file   [employer] needs - medical: Not on file   [employer] needs - non-medical: Not on file   Occupational History    Not on file   Tobacco Use    Smoking status: Never Smoker    Smokeless tobacco: Never Used   Substance and Sexual Activity    Alcohol use: No    Drug use: No    Sexual activity: Not Currently   Other Topics Concern    Not on file   Social History Narrative    Not on file       Allergies: Allergies   Allergen Reactions    Other Food Swelling     All nuts except peanuts    Tramadol Nausea and Vomiting    Nut - Unspecified Swelling    Mosier Swelling     Swollen lips       Outpatient Meds  No current facility-administered medications on file prior to encounter. Current Outpatient Medications on File Prior to Encounter   Medication Sig Dispense Refill    sodium chloride (NORMAL SALINE FLUSH) 5-10 mL by IntraVENous route daily. before and after IV abx administration      heparin, porcine, pf (HEPARIN LOCKFLUSH,PORCINE,,PF,) 10 unit/mL injection 10 Units by IntraVENous route daily. administer after ABX, NS      cefTRIAXone (ROCEPHIN) 1 gram injection 2 g by IntraVENous route.       topiramate ER (TROKENDI XR) 50 mg capsule Take 1 Cap by mouth nightly. 30 Cap 5    ondansetron (ZOFRAN ODT) 4 mg disintegrating tablet Take 1 Tab by mouth every eight (8) hours as needed for Nausea. 12 Tab 0    indomethacin (INDOCIN) 50 mg capsule Take 50 mg by mouth three (3) times daily as needed (HEADACHE).  triamcinolone acetonide (KENALOG) 0.147 mg/gram aero topical spray       clobetasol (TEMOVATE) 0.05 % ointment       lidocaine-tetracaine 7-7 % crea       nystatin (MYCOSTATIN) 100,000 unit/gram ointment Apply  to affected area two (2) times a day. 15 g 0    nystatin (MYCOSTATIN) powder Apply  to affected area four (4) times daily. 1 Bottle 1    albuterol (PROVENTIL VENTOLIN) 2.5 mg /3 mL (0.083 %) nebulizer solution by Nebulization route every four (4) hours as needed.  buPROPion SR (WELLBUTRIN SR) 100 mg SR tablet Take 100 mg by mouth two (2) times a day. 1    propranolol (INDERAL) 10 mg tablet Take 10 mg by mouth nightly as needed. 4    QVAR REDIHALER 40 mcg/actuation HFAb inhaler Take 40 mcg by inhalation two (2) times a day. 1    hydrocortisone (HYTONE) 2.5 % topical cream Apply  to affected area two (2) times a day. 30 g 0       Inpatient Meds  No current facility-administered medications for this encounter. Current Outpatient Medications:     sodium chloride (NORMAL SALINE FLUSH), 5-10 mL by IntraVENous route daily. before and after IV abx administration, Disp: , Rfl:     heparin, porcine, pf (HEPARIN LOCKFLUSH,PORCINE,,PF,) 10 unit/mL injection, 10 Units by IntraVENous route daily.  administer after ABX, NS, Disp: , Rfl:     cefTRIAXone (ROCEPHIN) 1 gram injection, 2 g by IntraVENous route., Disp: , Rfl:     topiramate ER (TROKENDI XR) 50 mg capsule, Take 1 Cap by mouth nightly., Disp: 30 Cap, Rfl: 5    ondansetron (ZOFRAN ODT) 4 mg disintegrating tablet, Take 1 Tab by mouth every eight (8) hours as needed for Nausea., Disp: 12 Tab, Rfl: 0    indomethacin (INDOCIN) 50 mg capsule, Take 50 mg by mouth three (3) times daily as needed (HEADACHE). , Disp: , Rfl:     triamcinolone acetonide (KENALOG) 0.147 mg/gram aero topical spray, , Disp: , Rfl:     clobetasol (TEMOVATE) 0.05 % ointment, , Disp: , Rfl:     lidocaine-tetracaine 7-7 % crea, , Disp: , Rfl:     nystatin (MYCOSTATIN) 100,000 unit/gram ointment, Apply  to affected area two (2) times a day., Disp: 15 g, Rfl: 0    nystatin (MYCOSTATIN) powder, Apply  to affected area four (4) times daily. , Disp: 1 Bottle, Rfl: 1    albuterol (PROVENTIL VENTOLIN) 2.5 mg /3 mL (0.083 %) nebulizer solution, by Nebulization route every four (4) hours as needed. , Disp: , Rfl:     buPROPion SR (WELLBUTRIN SR) 100 mg SR tablet, Take 100 mg by mouth two (2) times a day., Disp: , Rfl: 1    propranolol (INDERAL) 10 mg tablet, Take 10 mg by mouth nightly as needed. , Disp: , Rfl: 4    QVAR REDIHALER 40 mcg/actuation HFAb inhaler, Take 40 mcg by inhalation two (2) times a day., Disp: , Rfl: 1    hydrocortisone (HYTONE) 2.5 % topical cream, Apply  to affected area two (2) times a day., Disp: 30 g, Rfl: 0    No results found for this or any previous visit (from the past 24 hour(s)). Care Plan discussed with:  Patient x   Family    RN    Care Manager    Consultant/Specialist:       Laly Spence, [facility]

## 2019-02-28 NOTE — ED PROVIDER NOTES
45 y.o. female with past medical history significant for depression, seizures, scoliosis, gall stones, anemia, arthritis, who presents ambulatory to the ED with cc of tingling. Pt states she was treated for Lyme disease with Rocephin via PICC line for the past 2 months. Today, she reports tingling to the right side of her face, around the left side of her mouth, and to R>L hands starting at ~9:00AM this morning. She reports photophobia and headaches in the setting of recent acute optic neuritis to the right eye for which she is followed by a neuro-ophthalmologist. 
 
There are no other acute medical concerns at this time. Social Hx: Never Tobacco use, denies EtOH use, denies Illicit Drug use PCP: Romeo Chu DO Note written by Ann Lozoya, as dictated by Dominique Jin MD 12:08 PM 
 
 
 
The history is provided by the patient. No  was used. Past Medical History:  
Diagnosis Date  Arthritis  Depression  Headache  Ill-defined condition   
 scoliosis, gall stones, anemia  Seizures ([facility] [address] Past Surgical History:  
Procedure Laterality Date  HX  SECTION    
 x 4  
 HX HERNIA REPAIR    
 HX TUBAL LIGATION Family History:  
Problem Relation Age of Onset  Arthritis-rheumatoid Mother  Depression Father  Cancer Maternal Aunt  Cancer Maternal Uncle  Cancer Paternal Aunt  Cancer Paternal Uncle Social History Socioeconomic History  Marital status: SINGLE Spouse name: Not on file  Number of children: Not on file  Years of education: Not on file  Highest education level: Not on file Social Needs  Financial resource strain: Not on file  Food insecurity - worry: Not on file  Food insecurity - inability: Not on file  Transportation needs - medical: Not on file  Transportation needs - non-medical: Not on file Occupational History  Not on file Tobacco Use  
  Smoking status: Never Smoker  Smokeless tobacco: Never Used Substance and Sexual Activity  Alcohol use: No  
 Drug use: No  
 Sexual activity: Not Currently Other Topics Concern  Not on file Social History Narrative  Not on file ALLERGIES: Other food; Tramadol; Nut - unspecified; and [facility] Review of Systems Constitutional: Negative for chills and fever. Eyes: Positive for photophobia (old). Respiratory: Negative for shortness of breath. Cardiovascular: Negative for chest pain. Gastrointestinal: Negative for abdominal pain, constipation, diarrhea and vomiting. Neurological: Positive for numbness (tingling BL face and R hand) and headaches (old). Negative for dizziness and light-headedness. All other systems reviewed and are negative. Vitals:  
 02/28/19 1210 BP: 116/76 Pulse: 61 Resp: 14 Temp: 98.9 °F (37.2 °C) SpO2: 100% Weight: 102.5 kg (226 lb) Physical Exam  
Constitutional: She is oriented to person, place, and time. She appears well-developed and well-nourished. HENT:  
Head: Normocephalic and atraumatic. Eyes: Pupils are equal, round, and reactive to light. Neck: Normal range of motion. Neck supple. Cardiovascular: Normal rate and regular rhythm. Pulmonary/Chest: Effort normal and breath sounds normal.  
Abdominal: Soft. She exhibits no distension. There is no tenderness. Neurological: She is alert and oriented to person, place, and time. She has normal strength. No cranial nerve deficit or sensory deficit. Reported tingling on R side. Cranial nerves, strength, sensory, coordination, gait all normal.  
Skin: Skin is warm and dry. Capillary refill takes less than 2 seconds. Psychiatric: She has a normal mood and affect. Her behavior is normal.  
Nursing note and vitals reviewed. Note written by Ann Aleman, as dictated by Rakesh Pickett MD 12:08 PM 
 
 
MDM Number of Diagnoses or Management Options Tingling of face:  
Diagnosis management comments: The patient is now resting comfortably and feels better, is alert, talkative, interactive and in no distress. The patient appears well and is able to tolerate PO fluids. The repeat examination is unremarkable and benign. The patient is neurologically intact, has a normal mental status, and is ambulatory in the ED. The history, exam, diagnostic testing (if any) and the patient's current condition do not suggest meningitis, stroke, sepsis, subarachnoid hemorrhage, intracranial bleeding, encephalitis, temporal arteritis or other significant pathology to warrant further testing, continued ED treatment, admission, neurological consultation, or other specialist evaluation at this point. The vital signs have been stable. The patient's condition is stable and appropriate for discharge. The patient will pursue further outpatient evaluation with the primary care physician or other designated or consulting physician as indicated in the discharge instructions. Procedures 1:33 PM 
Neurology was paged, will come evaluated pt; ETA 2:00PM. 3:34 PM 
Shi Smith NP with neurology, here to evaluate pt. CONSULT NOTE: 
4:24 PM Debi Correia MD spoke with JOHN Piper, Consult for Neurology. Discussed available diagnostic tests and clinical findings. Thinks pt is having complex migraine, ordered medications for her and thinks she can be discharged. 6:32 PM 
Feeling better, will d/c. 
 
DISCHARGE NOTE 
6:42 PM 
Patient's results have been reviewed with them. Patient and/or family have verbally conveyed their understanding and agreement of the patient's signs, symptoms, diagnosis, treatment and prognosis and additionally agree to follow up as recommended or return to the Emergency Room should their condition change prior to follow-up.   Discharge instructions have also been provided to the patient with some educational information regarding their diagnosis as well a list of reasons why they would want to return to the ER prior to their follow-up appointment should their condition change.

## 2019-02-28 NOTE — LETTER
[address] 
[facility] EMERGENCY DEPT 
[address] [facility] [address] [id number] [phone] Work/School Note Date: 2/28/2019 To Whom It May concern: 
 
Sonja Wright was seen and treated today in the emergency room by the following provider(s): 
Attending Provider: Monico Hanna MD. Sonja Wright may return to work on 3/2/19. Sincerely, Kristen Sterling MD

## 2019-03-01 ENCOUNTER — OFFICE VISIT (OUTPATIENT)
Dept: NEUROLOGY | Age: 39
End: 2019-03-01

## 2019-03-01 VITALS
BODY MASS INDEX: 36.32 KG/M2 | RESPIRATION RATE: 16 BRPM | SYSTOLIC BLOOD PRESSURE: 110 MMHG | HEART RATE: 76 BPM | OXYGEN SATURATION: 98 % | DIASTOLIC BLOOD PRESSURE: 70 MMHG | WEIGHT: 226 LBS | HEIGHT: 66 IN

## 2019-03-01 DIAGNOSIS — R20.2 PARESTHESIA: ICD-10-CM

## 2019-03-01 DIAGNOSIS — G43.111 INTRACTABLE MIGRAINE WITH AURA WITH STATUS MIGRAINOSUS: Primary | ICD-10-CM

## 2019-03-01 NOTE — ED NOTES
Discharge Instructions Reviewed with patient. Discharge instructions given to patient per provider. patient able to return verbalize discharge instructions. Paper copy of discharge instructions given. Patient condition stable, Respiratory status WNL, Neurostatus intact. Patient ambulatory out of er, to home with family.

## 2019-03-01 NOTE — PROGRESS NOTES
Neurology Progress Note    Patient ID:  Andressa Mercedes  [id number]  1 y.o.  1980      Subjective:   History:  Cherie David a 40 y. o. female who  has a past medical history of Arthritis; Depression; Headache; and Seizures ([facility]). who in Sept 2018, noted blurred vision of the R eye, described as there is a film in the eyes and severe headache R frontal and R temple area, R periorbital, shooting to back of head, occurring every day, throbbing, constant, 8/10, no aggravating/relivieng factor with nausea and photophobia. Considerations include migraine, with visual auras, intractable, hemicrania continua and pseudotumor cerebri. Patient saw Dr Karishma Hanna (ophtalmologist) who said everything was fine. Patient was placed on Propranolol and Wellbutrin with no clear benefit. Patient was seen at ER with MRI brain showing non-specific white matter changes.     Yesterday, patient noted sudden R facial numbness and weakness and R hand numbness. Patient went to [facility] ER. (+) 5/10 R frontal headache. (+) nausea (+) vomiting CT head was fine. Given migraine cocktail which helped the headache but still has persistent R facial numbness. Patient is still on Topamax 50 mg QHS for headaches.     ROS:  Per HPI-  Otherwise the remainder of ROS was negative    Social Hx  Social History     Socioeconomic History    Marital status: SINGLE     Spouse name: Not on file    Number of children: Not on file    Years of education: Not on file    Highest education level: Not on file   Tobacco Use    Smoking status: Never Smoker    Smokeless tobacco: Never Used   Substance and Sexual Activity    Alcohol use: No    Drug use: No    Sexual activity: Not Currently       Meds:  Current Outpatient Medications on File Prior to Visit   Medication Sig Dispense Refill    topiramate ER (TROKENDI XR) 50 mg capsule Take 1 Cap by mouth nightly.  30 Cap 5    ondansetron (ZOFRAN ODT) 4 mg disintegrating tablet Take 1 Tab by mouth every eight (8) hours as needed for Nausea. 12 Tab 0    indomethacin (INDOCIN) 50 mg capsule Take 50 mg by mouth three (3) times daily as needed (HEADACHE).  triamcinolone acetonide (KENALOG) 0.147 mg/gram aero topical spray       clobetasol (TEMOVATE) 0.05 % ointment       lidocaine-tetracaine 7-7 % crea       nystatin (MYCOSTATIN) 100,000 unit/gram ointment Apply  to affected area two (2) times a day. 15 g 0    nystatin (MYCOSTATIN) powder Apply  to affected area four (4) times daily. 1 Bottle 1    albuterol (PROVENTIL VENTOLIN) 2.5 mg /3 mL (0.083 %) nebulizer solution by Nebulization route every four (4) hours as needed.  buPROPion SR (WELLBUTRIN SR) 100 mg SR tablet Take 100 mg by mouth two (2) times a day. 1    propranolol (INDERAL) 10 mg tablet Take 10 mg by mouth nightly as needed. 4    QVAR REDIHALER 40 mcg/actuation HFAb inhaler Take 40 mcg by inhalation two (2) times a day. 1    hydrocortisone (HYTONE) 2.5 % topical cream Apply  to affected area two (2) times a day. 30 g 0    sodium chloride (NORMAL SALINE FLUSH) 5-10 mL by IntraVENous route daily. before and after IV abx administration      heparin, porcine, pf (HEPARIN LOCKFLUSH,PORCINE,,PF,) 10 unit/mL injection 10 Units by IntraVENous route daily. administer after ABX, NS      cefTRIAXone (ROCEPHIN) 1 gram injection 2 g by IntraVENous route.        Current Facility-Administered Medications on File Prior to Visit   Medication Dose Route Frequency Provider Last Rate Last Dose    [COMPLETED] valproate (DEPACON) 1,000 mg in 0.9% sodium chloride 50 mL IVPB  1,000 mg IntraVENous Leta Laly Ochoa NP   Stopped at 02/28/19 1916    [COMPLETED] magnesium sulfate 2 g/50 ml IVPB (premix or compounded)  2 g IntraVENous Leta Laly Ohcoa NP   Stopped at 02/28/19 1916    [COMPLETED] methylPREDNISolone ((Solu-MEDROL) 1,000 mg in 0.9% sodium chloride (MBP/ADV) 100 mL  1,000 mg IntraVENous NOW Silvana Figueroa A NP   Stopped at 02/28/19 1752       Imaging:    CT Results (recent):  Results from Hospital Encounter encounter on 02/28/19   CT HEAD WO CONT    Narrative EXAM: CT HEAD WO CONT    INDICATION: right facial tingling    COMPARISON: None. CONTRAST: None. TECHNIQUE: Unenhanced CT of the head was performed using 5 mm images. Brain and  bone windows were generated. CT dose reduction was achieved through use of a  standardized protocol tailored for this examination and automatic exposure  control for dose modulation. FINDINGS:  The ventricles and sulci are normal in size, shape and configuration and  midline. There is no significant white matter disease. There is no intracranial  hemorrhage, extra-axial collection, mass, mass effect or midline shift. The  basilar cisterns are open. No acute infarct is identified. The bone windows  demonstrate no abnormalities. The visualized portions of the paranasal sinuses  and mastoid air cells are clear. Impression IMPRESSION: No acute intracranial process identified       MRI Results (recent):  Results from Hospital Encounter encounter on 09/14/18   MRI BRAIN W WO CONT    Narrative EXAM: MRI BRAIN     INDICATION:   Include scan of orbits bilaterally. Rule out MS and Optic  Neuritis per Ophthalmology   Vision loss     SEQUENCES:   Sagittal T1, axial T1, T2, GRE and FLAIR; axial and coronal T1 post  enhancement; and diffusion imaging of the brain. 19 mL of Dotarem. Thin section  imaging is also performed through the orbits with and without contrast.    FINDINGS:     Orbital soft tissues, optic nerves and chiasm are normal in size, morphology,  signal intensity and enhancement. There are a few punctate white matter foci of T2 hyperintensity in the left  parietal lobe, nonspecific. Remainder of the brain including corpus callosum and  pericallosal white matter show normal signal intensity.   The enhancement is  normal.      There is no evidence for mass, hemorrhage, shift, or hydrocephalus. There is no  extra-axial fluid collection. Diffusion imaging shows no diffusion restriction  to indicate acute infarct/ischemia or other process. Impression IMPRESSION: Few nonspecific left parietal white matter T2 hyperintensities. Normal appearance of the orbits. Normal diffusion and enhancement. IR Results (recent):  No results found for this or any previous visit. VAS/US Results (recent):  Results from [place] encounter on 10/31/18   DUPLEX LOWER EXT VENOUS RIGHT       Reviewed records in [place] [facility] [place] tab today    Lab Review     Admission on 01/24/2019, Discharged on 01/28/2019   Component Date Value Ref Range Status    WBC 01/24/2019 7.4  3.6 - 11.0 K/uL Final    Comment: Due to mathematical rounding between the [address], and the new [employer] Hematology analyzers, the reported automated differential may vary by up to +/- 0.5% per cell line. This finding may produce a result that is 100% +/- 3%, which is clinically insignificant.  RBC 01/24/2019 4.22  3.80 - 5.20 M/uL Final    HGB 01/24/2019 11.9  11.5 - 16.0 g/dL Final    HCT 01/24/2019 39.2  35.0 - 47.0 % Final    MCV 01/24/2019 92.9  80.0 - 99.0 FL Final    MCH 01/24/2019 28.2  26.0 - 34.0 PG Final    MCHC 01/24/2019 30.4  30.0 - 36.5 g/dL Final    RDW 01/24/2019 14.7* 11.5 - 14.5 % Final    PLATELET 36/78/1779 012  150 - 400 K/uL Final    MPV 01/24/2019 10.8  8.9 - 12.9 FL Final    NRBC 01/24/2019 0.0  0  WBC Final    ABSOLUTE NRBC 01/24/2019 0.00  0.00 - 0.01 K/uL Final    NEUTROPHILS 01/24/2019 55  32 - 75 % Final    LYMPHOCYTES 01/24/2019 31  12 - 49 % Final    MONOCYTES 01/24/2019 9  5 - 13 % Final    EOSINOPHILS 01/24/2019 5  0 - 7 % Final    BASOPHILS 01/24/2019 0  0 - 1 % Final    IMMATURE GRANULOCYTES 01/24/2019 0  0.0 - 0.5 % Final    ABS. NEUTROPHILS 01/24/2019 4.1  1.8 - 8.0 K/UL Final    ABS.  LYMPHOCYTES 01/24/2019 2.3  0.8 - 3.5 K/UL Final    ABS. MONOCYTES 01/24/2019 0.7  0.0 - 1.0 K/UL Final    ABS. EOSINOPHILS 01/24/2019 0.4  0.0 - 0.4 K/UL Final    ABS. BASOPHILS 01/24/2019 0.0  0.0 - 0.1 K/UL Final    ABS. IMM. GRANS. 01/24/2019 0.0  0.00 - 0.04 K/UL Final    DF 01/24/2019 AUTOMATED    Final    Sodium 01/24/2019 142  136 - 145 mmol/L Final    Potassium 01/24/2019 3.9  3.5 - 5.1 mmol/L Final    Chloride 01/24/2019 114* 97 - 108 mmol/L Final    CO2 01/24/2019 22  21 - 32 mmol/L Final    Anion gap 01/24/2019 6  5 - 15 mmol/L Final    Glucose 01/24/2019 86  65 - 100 mg/dL Final    BUN 01/24/2019 11  6 - 20 MG/DL Final    Creatinine 01/24/2019 0.83  0.55 - 1.02 MG/DL Final    BUN/Creatinine ratio 01/24/2019 13  12 - 20   Final    GFR est AA 01/24/2019 >60  >60 ml/min/1.73m2 Final    GFR est non-AA 01/24/2019 >60  >60 ml/min/1.73m2 Final    Comment: Estimated GFR is calculated using the IDMS-traceable Modification of Diet in Renal Disease (MDRD) Study equation, reported for both  Americans (GFRAA) and non- Americans (GFRNA), and normalized to 1.73m2 body surface area. The physician must decide which value applies to the patient. The MDRD study equation should only be used in individuals age 25 or older. It has not been validated for the following: pregnant women, patients with serious comorbid conditions, or on certain medications, or persons with extremes of body size, muscle mass, or nutritional status.  Calcium 01/24/2019 8.0* 8.5 - 10.1 MG/DL Final    Bilirubin, total 01/24/2019 0.2  0.2 - 1.0 MG/DL Final    ALT (SGPT) 01/24/2019 27  12 - 78 U/L Final    AST (SGOT) 01/24/2019 14* 15 - 37 U/L Final    Alk.  phosphatase 01/24/2019 72  45 - 117 U/L Final    Protein, total 01/24/2019 7.1  6.4 - 8.2 g/dL Final    Albumin 01/24/2019 3.1* 3.5 - 5.0 g/dL Final    Globulin 01/24/2019 4.0  2.0 - 4.0 g/dL Final    A-G Ratio 01/24/2019 0.8* 1.1 - 2.2   Final    SAMPLES BEING HELD 01/24/2019 RED,CODI,UA,UC Final    COMMENT 01/24/2019 Add-on orders for these samples will be processed based on acceptable specimen integrity and analyte stability, which may vary by analyte. Final    WBC 01/25/2019 6.6  3.6 - 11.0 K/uL Final    Comment: Due to mathematical rounding between the [address], and the new [employer] Hematology analyzers, the reported automated differential may vary by up to +/- 0.5% per cell line. This finding may produce a result that is 100% +/- 3%, which is clinically insignificant.       RBC 01/25/2019 3.74* 3.80 - 5.20 M/uL Final    HGB 01/25/2019 10.7* 11.5 - 16.0 g/dL Final    HCT 01/25/2019 34.0* 35.0 - 47.0 % Final    MCV 01/25/2019 90.9  80.0 - 99.0 FL Final    MCH 01/25/2019 28.6  26.0 - 34.0 PG Final    MCHC 01/25/2019 31.5  30.0 - 36.5 g/dL Final    RDW 01/25/2019 14.6* 11.5 - 14.5 % Final    PLATELET 87/68/3901 937  150 - 400 K/uL Final    MPV 01/25/2019 9.9  8.9 - 12.9 FL Final    NRBC 01/25/2019 0.0  0  WBC Final    ABSOLUTE NRBC 01/25/2019 0.00  0.00 - 0.01 K/uL Final    Sodium 01/25/2019 141  136 - 145 mmol/L Final    Potassium 01/25/2019 3.7  3.5 - 5.1 mmol/L Final    Chloride 01/25/2019 113* 97 - 108 mmol/L Final    CO2 01/25/2019 21  21 - 32 mmol/L Final    Anion gap 01/25/2019 7  5 - 15 mmol/L Final    Glucose 01/25/2019 107* 65 - 100 mg/dL Final    BUN 01/25/2019 11  6 - 20 MG/DL Final    Creatinine 01/25/2019 0.87  0.55 - 1.02 MG/DL Final    BUN/Creatinine ratio 01/25/2019 13  12 - 20   Final    GFR est AA 01/25/2019 >60  >60 ml/min/1.73m2 Final    GFR est non-AA 01/25/2019 >60  >60 ml/min/1.73m2 Final    Calcium 01/25/2019 8.1* 8.5 - 10.1 MG/DL Final    IgG, Quant, CSF 01/26/2019 3.4  0.0 - 8.6 mg/dL Final    Albumin, CSF 01/26/2019 17  11 - 48 mg/dL Final    Albumin, serum 01/26/2019 3.5  3.5 - 5.5 g/dL Final    Comment: (NOTE)  Performed At: [address] [id number]  Maya Hemphill MD CQ:[id number]      Immunoglobulin G, Qt. 01/26/2019 1,437  700 - 1,600 mg/dL Final    Comment: (NOTE)  Performed At: [address] [id number]  Julia Sutherland MD OF:[id number]      IgG/Alb ratio, CSF 01/26/2019 0.20  0.00 - 0.25   Final    CSF IgG Index 01/26/2019 0.5  0.0 - 0.7   Final    IgG Synthesis Rate, CSF 01/26/2019 SEE COMMENT  NEG 9.9 TO +3.3 mg/day Final    Comment: (NOTE)  RESULT:-4.0      Oligoclonal Bands 01/26/2019 Comment    Final    Comment: (NOTE)  Zero (0) oligoclonal bands were observed in the CSF. Interpretation:  Criteria for Positivity: Four (4) or more oligoclonal bands  observed  only in the CSF have been shown to be most consistent with  MS  using our method. [Adithya AS, Sung EL, Miriam WOODY, and  Dioni JA: Cerebrospinal Fluid Oligoclonal Bands in the  Diagnosis  of Multiple Sclerosis. Am J Clin Pathol 120(5):672-675,  2003]. Oligoclonal bands that are present only in the CSF have been  associated with a variety of inflammatory brain diseases such as  multiple sclerosis (MS), subacute encephalitis, neurosyphilis,  etc.  Increased IgG in the CSF is not specific for MS, but is an  indication  of chronic neural inflammation. Clinical correlation  indicated. Approximately 2-3% of clinically confirmed MS patients show little  or no evidence of oligoclonal bands in the CSF; however  oligoclonal  bands may develop as the disease progresses. Oligoclonal Banding testing performed using Isoelectric Focusing  (                           IEF) and immunoblotting methodology. Performed At: [facility]  [place] [address] [place] [id number]  Julia Sutherland MD GI:[id number]      CSF/Serum Alb.  Index 01/26/2019 5  0 - 8   Final    Comment: (NOTE)          Relationship to blood-brain barrier:           Consistent with an intact barrier        <9           Slight impairment                   9 -  14           Moderate impairment 14 -  30           Severe impairment                  30 - 100           Complete breakdown                     >100  Performed At: [address] [id number]  Livia Moraes MD KJ:[id number]      CSF TUBE NO. 01/26/2019 3    Final    CSF COLOR 01/26/2019 COLORLESS  COL   Final    CSF APPEARANCE 01/26/2019 CLEAR  CLEAR   Final    CSF RBCS 01/26/2019 6* 0 /cu mm Final    CSF WBCS 01/26/2019 0  0 - 5 /cu mm Final    Tube No. 01/26/2019 1    Final    Glucose,CSF 01/26/2019 55  40 - 70 MG/DL Final    Tube No. 01/26/2019 1    Final    Protein,CSF 01/26/2019 38  15 - 45 MG/DL Final    P93 Ab, IgG 01/26/2019 Absent   Final    P66 Ab, IgG 01/26/2019 Absent   Final    P58 Ab, IgG 01/26/2019 Absent   Final    P45 Ab, IgG 01/26/2019 Absent   Final    P41 Ab, IgG 01/26/2019 Absent   Final    P39 Ab, IgG 01/26/2019 Absent   Final    P30 Ab, IgG 01/26/2019 Absent   Final    P28 Ab, IgG 01/26/2019 Absent   Final    P23 Ab, IgG 01/26/2019 Absent   Final    P18 Ab, IgG 01/26/2019 Absent   Final    Lyme IgG WB Interp. 01/26/2019 NEGATIVE     Final    Comment: (NOTE)  Positive: 5 of the following bands: Borrelia-specific           bands: J12,44,43,59,51,63,79,06,69, and 93. Negative: No bands or banding patterns which do not meet           positive criteria. Note: For investigational use only.  P41 Ab, IgM 01/26/2019 Absent   Final    P39 Ab, IgM 01/26/2019 Absent   Final    P23 Ab, IgM 01/26/2019 Absent   Final    Lyme IgM WB Interp. 01/26/2019 NEGATIVE     Final    Comment: (NOTE)  Note: An equivocal or positive EIA result followed by a negative  Western Blot result is considered NEGATIVE. An equivocal or  positive EIA result followed by a positive Western Blot is considered  POSITIVE by the CDC. Positive: 2 of the following bands: 23,39 or 41  Negative: No bands or banding patterns which do not meet positive  criteria.   Criteria for positivity are those recommended by CDC/ASTPHLD.  p23=Osp C, z19=nhcrweaeq  Criteria for positivity are those recommended by CDC/ASTPHLD.  p23=Osp C, o05=hhqjdzwxe  Performed At: [facility]  [facility] [address] [place], [place] [id number]  Bel Ware MD MF:[id number]     [address] 01/26/2019 TUBE 4 OF CSF(2cc)   Final    COMMENT 01/26/2019 Add-on orders for these samples will be processed based on acceptable specimen integrity and analyte stability, which may vary by analyte.     Final    B burgdorferi C6 Peptide 01/27/2019 <0.91  0.00 - 0.90 index Final    Comment: (NOTE)                                 Negative         <0.91                                 Equivocal  0.91 - 1.09                                 Positive         >1.09  Performed At: [address] [id number]  Bel Ware MD UN:[id number]      Lyme Ab, IgG/IgM 01/27/2019 <0.91  0.00 - 0.90 ISR Final    Comment: (NOTE)                                 Negative         <0.91                                 Equivocal  0.91 - 1.09                                 Positive         >1.09  Performed At: [address] [id number]  Bel Ware MD ET:[id number]     Orders Only on 12/13/2018   Component Date Value Ref Range Status    WBC 12/13/2018 5.2  3.4 - 10.8 x10E3/uL Final    RBC 12/13/2018 3.98  3.77 - 5.28 x10E6/uL Final    HGB 12/13/2018 11.2  11.1 - 15.9 g/dL Final    HCT 12/13/2018 34.8  34.0 - 46.6 % Final    MCV 12/13/2018 87  79 - 97 fL Final    MCH 12/13/2018 28.1  26.6 - 33.0 pg Final    MCHC 12/13/2018 32.2  31.5 - 35.7 g/dL Final    RDW 12/13/2018 15.2  12.3 - 15.4 % Final    PLATELET 78/96/0827 009  150 - 379 x10E3/uL Final    NEUTROPHILS 12/13/2018 60  Not Estab. % Final    Lymphocytes 12/13/2018 31  Not Estab. % Final    MONOCYTES 12/13/2018 6  Not Estab. % Final    EOSINOPHILS 12/13/2018 3  Not Estab. % Final    BASOPHILS 12/13/2018 0  Not Estab. % Final    ABS. NEUTROPHILS 12/13/2018 3.1  1.4 - 7.0 x10E3/uL Final    Abs Lymphocytes 12/13/2018 1.6  0.7 - 3.1 x10E3/uL Final    ABS. MONOCYTES 12/13/2018 0.3  0.1 - 0.9 x10E3/uL Final    ABS. EOSINOPHILS 12/13/2018 0.2  0.0 - 0.4 x10E3/uL Final    ABS. BASOPHILS 12/13/2018 0.0  0.0 - 0.2 x10E3/uL Final    IMMATURE GRANULOCYTES 12/13/2018 0  Not Estab. % Final    ABS. IMM. GRANS. 12/13/2018 0.0  0.0 - 0.1 x10E3/uL Final    Lyme Ab, IgG/IgM 12/13/2018 1.02* 0.00 - 0.90 ISR Final    Comment:                                 Negative         <0.91                                  Equivocal  0.91 - 1.09                                  Positive         >1.09      Lyme Disease Ab, IgM, QT 12/13/2018 <0.80  0.00 - 0.79 index Final    Comment:                                 Negative         <0.80                                  Equivocal  0.80 - 1.19                                  Positive         >1.19   IgM levels may peak at 3-6 weeks post infection, then   gradually decline.  IgG P93 Ab 12/13/2018 Present*  Final    IgG P66 Ab 12/13/2018 Absent   Final    IgG P58 Ab 12/13/2018 Present*  Final    IgG P45 Ab 12/13/2018 Absent   Final    IgG P41 Ab 12/13/2018 Present*  Final    IgG P39 Ab 12/13/2018 Present*  Final    IgG P30 Ab 12/13/2018 Absent   Final    IgG P28 Ab 12/13/2018 Absent   Final    IgG P23 Ab 12/13/2018 Present*  Final    IgG P18 Ab 12/13/2018 Present*  Final    Lyme Ab, IgG WB Interp. 12/13/2018 Positive*  Final    Comment:                      Positive: 5 of the following                                 Borrelia-specific bands:                                 18,23,28,30,39,41,45,58,                                 66, and 93. Negative: No bands or banding                                 patterns which do not                                 meet positive criteria.       IgM P41 Ab 12/13/2018 Absent   Final    IgM P39 Ab 12/13/2018 Absent   Final  IgM P23 Ab 12/13/2018 Present*  Final    Lyme Ab, IgM WB Interp. 12/13/2018 Negative   Final    Comment: Note: An equivocal or positive EIA result followed by a negative  Western Blot result is considered NEGATIVE. An equivocal or positive  EIA result followed by a positive Western Blot is considered POSITIVE  by the CDC. Positive: 2 of the following bands: 23,39 or 41  Negative: No bands or banding patterns which do not meet positive  criteria. Criteria for positivity are those recommended by CDC/ASTPHLD.  p23=Osp C, e34=cocgtdrgy  Note:  Sera from individuals with the following may cross react in the  Lyme Western Blot assays: other spirochetal diseases (periodontal  disease, leptospirosis, relapsing fever, yaws, and pinta);  connective autoimmune (Rheumatoid Arthritis and Systemic Lupus  Erythematosus and also individuals with Antinuclear Antibody);  other infections [facility] Spotted Fever; Eber-Barr Virus,  and Cytomegalovirus).  Non-Treponemal Screening VDRL 12/13/2018 Normal   Final    Comment: Reference Range:  Qualitative test  Non-reactive: Normal  Reactive: Abnormal  The venereal disease research laboratory (VDRL) tests  measure the host's antibody response to non treponemal  antigens and hence they lack specificity. Therefore, a  positive result by VDRL requires confirmation by a  treponemal-specific test, such as the fluorescent treponemal  antibody-absorbed, TERRI or microhemagglutination assay. *This test has been developed and performance parameters  have been validated by Pat Reich. This test has  not been approved by the U.S. Food and Drug Administration  (FDA); however, US FDA approval is not required for clinical  use. It is not intended that clinical diagnosis and patient  management decisions be made using these results alone. This test has been validated using serum samples.  The   has not determined the efficacy of this test  when performed on CSF, plasma, joint or pleural fluid  specimens. The performance characteristics of this mere                           t were  determined by Ricky Grant Rheumatoid factor 12/13/2018 <10.0  0.0 - 13.9 IU/mL Final    Angiotensin Converting Enzyme (ACE) 12/13/2018 47  14 - 82 U/L Final    HIV SCREEN 4TH GENERATION WRFX 12/13/2018 Non Reactive  Non Reactive Final    C-Reactive Protein, Cardiac 12/13/2018 2.93  0.00 - 3.00 mg/L Final    Comment:          Relative Risk for Future Cardiovascular Event                               Low                 <1.00                               Average       1.00 - 3.00                               High                >3.00      Sed rate (ESR) 12/13/2018 25  0 - 32 mm/hr Final    T. pallidum Ab (FTA-Ab) 12/13/2018 Non Reactive  Non Reactive Final         Objective:       Exam:  Visit Vitals  /70 (BP 1 Location: Right arm, BP Patient Position: Sitting)   Pulse 76   Resp 16   Ht 5' 6\" (1.676 m)   Wt 102.5 kg (226 lb)   SpO2 98%   BMI 36.48 kg/m²     Gen: Awake, alert, follows commands  Appropriate appearance, normal speech. Oriented to all spheres. No visual field defect on confrontation exam.  Full eyes movement, with no nystagmus, no diplopia, no ptosis. Normal gag and swallow. All remaining cranial nerves were normal  Motor function: 5/5 in all extremities  Sensory: dec PP R jaw, rest is intact  DTRs ++ in all extremities, (-) Babinski  Good FTN and HTS   Gait: Normal    Assessment:       ICD-10-CM ICD-9-CM    1. Intractable migraine with aura with status migrainosus G43. 111 346.03    2. Paresthesia R20.2 782.0      Paresthesia related to migraine       Plan:   1. Offered repeating another MRI brain, but since CT head is normal and patient is improving, will hold off for now  2. Continue Topamax 50 mg QHS as headache preventative  3. Continue headache diary and list that can trigger headache  4.  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

## 2019-03-19 ENCOUNTER — OFFICE VISIT (OUTPATIENT)
Dept: FAMILY MEDICINE CLINIC | Age: 39
End: 2019-03-19

## 2019-03-19 VITALS
HEIGHT: 66 IN | SYSTOLIC BLOOD PRESSURE: 109 MMHG | BODY MASS INDEX: 36.48 KG/M2 | RESPIRATION RATE: 18 BRPM | DIASTOLIC BLOOD PRESSURE: 74 MMHG | WEIGHT: 227 LBS | HEART RATE: 60 BPM | OXYGEN SATURATION: 99 % | TEMPERATURE: 98.6 F

## 2019-03-19 DIAGNOSIS — R20.0 FACIAL NUMBNESS: Primary | ICD-10-CM

## 2019-03-19 DIAGNOSIS — R20.0 RIGHT UPPER EXTREMITY NUMBNESS: ICD-10-CM

## 2019-03-19 DIAGNOSIS — M25.531 RIGHT WRIST PAIN: ICD-10-CM

## 2019-03-19 DIAGNOSIS — R90.82 WHITE MATTER ABNORMALITY ON MRI OF BRAIN: ICD-10-CM

## 2019-03-19 DIAGNOSIS — H20.9 UVEITIS: ICD-10-CM

## 2019-03-19 NOTE — PROGRESS NOTES
Chief Complaint   Patient presents with    Numbness     and tingling in face,X month     1. Have you been to the ER, urgent care clinic since your last visit? Hospitalized since your last visit? No    2. Have you seen or consulted any other health care providers outside of the [address] [place] [address] since your last visit? Include any pap smears or colon screening.  No

## 2019-03-19 NOTE — PROGRESS NOTES
HPI     Chief Complaint   Patient presents with    Numbness     and tingling in face,X month     She is a 45 y.o. female who presents for numbness and tinling in face. Facial Numbness  This is a well known patient of mine. Patient was first seen in October for evaluation of eye pain. Received note from Dr. 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 She was started on Doxy which she did not tolerate and after much difficulty with insurance was started on CTX treatment. On repeat of her labs her Lyme titers were neg and ID suggested that her uveitis was likely from another etiology. She was worked up for [address] after being admitted for worsening sx with negative workup. I have talked with Rheum about this patient who have stated they will see her if she is having active uveitis. She went to see an eye specialist at [facility] who stated she did not have anterior uveitis and suspected optic neuritis instead. Suggested she follow up with Neuro and her PCP for further workup. Has not been back to see Optho since February. Has been having R sided numbness and tingling on face since the IV abx were discontinued. States the feeling sometimes radiates down the R arm and her hand will have pain. Has not found any aggravating factors or alleviating factors. States the tingling is constant but the numbness comes and goes. Denies any visual deficits currently. States she has had a couple episdoes while driving where her eye \"goes out\" and her R eye \"blacks out. \"     Imaging  - CT Head 2/28 showed no acute intracranial process  - Lumbar Puncture 1/26 with normal pressure 6cm, CSF labs were all WNL  - MRI Head 9/14 shows non specific L parietal white matter T2 hyperintensities with normal appearance of orbits, normal diffusion and enhancement     Review of Systems   Constitutional: Negative for chills and fever.    Neurological: Positive for numbness. Negative for weakness and headaches. Reviewed PmHx, RxHx, FmHx, SocHx, AllgHx and updated and dated in the chart. Physical Exam:  Visit Vitals  /74   Pulse 60   Temp 98.6 °F (37 °C) (Oral)   Resp 18   Ht 5' 6\" (1.676 m)   Wt 227 lb (103 kg)   LMP 02/25/2019   SpO2 99%   BMI 36.64 kg/m²     Physical Exam   Constitutional: She appears well-developed and well-nourished. No distress. Cardiovascular: Normal rate, regular rhythm and normal heart sounds. Exam reveals no gallop and no friction rub. No murmur heard. Pulmonary/Chest: Effort normal and breath sounds normal. No stridor. No respiratory distress. She has no wheezes. She has no rales. Musculoskeletal: She exhibits no edema or tenderness. Tight trapezius muscles BL, pain with palpation. Patient notes pain with palpation of R wrist and R 3rd PIP joint. No swelling appreciated. Neurological: She is alert. No cranial nerve deficit. She exhibits normal muscle tone. Coordination normal.   CN II- XII intact. Gross sensation intact in upper extremities, patient notes that sensation feels different on R side of face. No tongue fasiculations. PEERLA. EOM intact. Neg Hoffmans sign. Skin: She is not diaphoretic. Psychiatric: She has a normal mood and affect. Her behavior is normal.   Nursing note and vitals reviewed. No results found for this or any previous visit (from the past 12 hour(s)). Assessment / Plan     Facial Numbness  - This is a new problem since I last saw the patient. - Patient has had extensive workup for this problem. Had neg CT head at [facility]. Previous MRI showed non specific white matter lesions. LP did not exhibit any evidence of infectious or MS process. - Recommend patient follow up with Neurology and Optho sooner then August  - Will obtain celiac profile and Vitamin B12  - Had previously discussed with Rheum a workup if patient had active uveitis.  She had an extensive autoimmune workup when she first presented to me which was essentially neg except for Lyme labs. Patient is still complaining of eye pain and now with this remote pain in her wrist/ IP joints may be beneficial for their input and to rule out RA? Follow up pending labs/ referrals    I have discussed the diagnosis with the patient and the intended plan as seen in the above orders. The patient has received an after-visit summary and questions were answered concerning future plans. I have discussed medication side effects and warnings with the patient as well.     Patient discussed with Dr. Meka Whitehead (Attending)    Ginny Dyer DO  Family Medicine Resident

## 2019-03-19 NOTE — PATIENT INSTRUCTIONS
[facility]  Address: [address], [address], [address]   Phone: [phone]    I recommend that you eat a sandwich and come back for labs in 1 hour    Please follow up with Neurology         Numbness and Tingling: Care Instructions  Your Care Instructions    Many things can cause numbness or tingling. Swelling may put pressure on a nerve. This could cause you to lose feeling or have a pins-and-needles sensation on part of your body. Nerves may be damaged from trauma, toxins, or diseases, such as diabetes or multiple sclerosis (MS). Sometimes, though, the cause is not clear. If there is no clear reason for your symptoms, and you are not having any other symptoms, your doctor may suggest watching and waiting for a while to see if the numbness or tingling goes away on its own. Your doctor may want you to have blood or nerve tests to find the cause of your symptoms. Follow-up care is a key part of your treatment and safety. Be sure to make and go to all appointments, and call your doctor if you are having problems. It's also a good idea to know your test results and keep a list of the medicines you take. How can you care for yourself at home? · If your doctor prescribes medicine, take it exactly as directed. Call your doctor if you think you are having a problem with your medicine. · If you have any swelling, put ice or a cold pack on the area for 10 to 20 minutes at a time. Put a thin cloth between the ice and your skin. When should you call for help? Call 911 anytime you think you may need emergency care. For example, call if:    · You have weakness, numbness, or tingling in both legs.     · You lose bowel or bladder control.     · You have symptoms of a stroke. These may include:  ? Sudden numbness, tingling, weakness, or loss of movement in your face, arm, or leg, especially on only one side of your body. ? Sudden vision changes. ? Sudden trouble speaking.   ? Sudden confusion or trouble understanding simple statements. ? Sudden problems with walking or balance. ? A sudden, severe headache that is different from past headaches.    Watch closely for changes in your health, and be sure to contact your doctor if you have any problems, or if:    · You do not get better as expected. Where can you learn more? Go to http://sarmad-farhana.info/. Enter E225 in the search box to learn more about \"Numbness and Tingling: Care Instructions. \"  Current as of: Gwen 3, 2018  Content Version: 11.9  © 5369-0149 [employer]. Care instructions adapted under license by [employer] (which disclaims liability or warranty for this information).  If you have questions about a medical condition or this instruction, always ask your healthcare professional. [address] any warranty or liability for your use of this information.

## 2019-03-20 ENCOUNTER — LAB ONLY (OUTPATIENT)
Dept: FAMILY MEDICINE CLINIC | Age: 39
End: 2019-03-20

## 2019-03-20 DIAGNOSIS — R20.0 FACIAL NUMBNESS: ICD-10-CM

## 2019-03-20 NOTE — PROGRESS NOTES
[address] Medicine Residency Attending Addendum:  Patient encounter was discussed on the day of the encounter with Austin Vila DO, performing the key elements of the service. I discussed the findings, assessment and plan with the resident and agree with the resident's findings and plan as documented in the resident's note.       Miladys Holguin MD, CAQSM, RMSK

## 2019-03-20 NOTE — TELEPHONE ENCOUNTER
Anyi From [facility] patient Infusion is calling to see if this was done at a different location. Read her the notes and informed her it was done at Home health.

## 2019-03-22 LAB
GLIADIN PEPTIDE IGA SER-ACNC: 15 UNITS (ref 0–19)
GLIADIN PEPTIDE IGG SER-ACNC: 4 UNITS (ref 0–19)
IGA SERPL-MCNC: 254 MG/DL (ref 87–352)
TTG IGA SER-ACNC: <2 U/ML (ref 0–3)
TTG IGG SER-ACNC: 4 U/ML (ref 0–5)
VIT B12 SERPL-MCNC: 613 PG/ML (ref 232–1245)

## 2019-03-26 ENCOUNTER — TELEPHONE (OUTPATIENT)
Dept: FAMILY MEDICINE CLINIC | Age: 39
End: 2019-03-26

## 2019-03-26 NOTE — TELEPHONE ENCOUNTER
[facility]  [facility] [address]-- [phone]    Called to say the patient has an appointment there today 1:40 pm  She will refax the request of today for last office visit and lab results.

## 2019-04-01 ENCOUNTER — TELEPHONE (OUTPATIENT)
Dept: NEUROLOGY | Age: 39
End: 2019-04-01

## 2019-04-01 NOTE — TELEPHONE ENCOUNTER
Pt is calling in ref to her right side of face tingling and has numbness. Er told her to see her Neurologist as soon as possible. Pt wants to know if you can get her in to see Dr Jacob Villa sooner than May 28.  I did not give her that appt due to her wanting to see him as soon as possible  Best # [phone]

## 2019-04-02 ENCOUNTER — TELEPHONE (OUTPATIENT)
Dept: NEUROLOGY | Age: 39
End: 2019-04-02

## 2019-04-02 NOTE — TELEPHONE ENCOUNTER
----- Message from Jose De Jesus Montoya sent at 4/2/2019 12:46 PM EDT -----  Regarding: /Telephone  Pt is requesting a call back from the nurse in reference to head ache, and pt would like to know if she is able to work under her head ache condition. [phone].

## 2019-04-02 NOTE — TELEPHONE ENCOUNTER
Called and spoke to patient she is wanting to apply for disability for headaches and was told by a disablity  to have her neurologist write a letter stating that she can no longer work due to headaches.   The medications that have been prescribed are not helping she cannot concentrate and keeps falling asleep during the day/work and someone has to wake her up   Please advise if letter is going to be written  She has a follow up appt on fri

## 2019-04-05 ENCOUNTER — OFFICE VISIT (OUTPATIENT)
Dept: NEUROLOGY | Age: 39
End: 2019-04-05

## 2019-04-05 VITALS
SYSTOLIC BLOOD PRESSURE: 110 MMHG | WEIGHT: 228 LBS | BODY MASS INDEX: 36.64 KG/M2 | HEIGHT: 66 IN | RESPIRATION RATE: 16 BRPM | OXYGEN SATURATION: 98 % | HEART RATE: 78 BPM | DIASTOLIC BLOOD PRESSURE: 70 MMHG

## 2019-04-05 DIAGNOSIS — G43.111 INTRACTABLE MIGRAINE WITH AURA WITH STATUS MIGRAINOSUS: Primary | ICD-10-CM

## 2019-04-05 DIAGNOSIS — R20.2 PARESTHESIA: ICD-10-CM

## 2019-04-05 NOTE — LETTER
4/5/19 Patient: Maritza Hopkins YOB: 1980 Date of Visit: 4/5/2019 Ehsan Coles, [address] [place] [address] VIA In Basket Dear Ehsan Coles DO, Thank you for referring Ms. Thaddeus Muhammad to [facility] for evaluation. My notes for this consultation are attached. If you have questions, please do not hesitate to call me. I look forward to following your patient along with you. Sincerely, Jamal Hopkins MD

## 2019-04-05 NOTE — PROGRESS NOTES
Neurology Progress Note    Patient ID:  Valentin Thompson  [id number]  59 y.o.  1980      Subjective:   History:  Omaira Mcintosh a  female who  has a past medical history of Arthritis; Depression; Headache; and Seizures ([facility]). who in Sept 2018, noted blurred vision of the R eye, described as there is a film in the eyes and severe headache R frontal and R temple area, R periorbital, shooting to back of head, occurring every day, throbbing, constant, 8/10, no aggravating/relivieng factor with nausea and photophobia. Considerations include migraine, with visual auras, intractable, hemicrania continua and pseudotumor cerebri. Patient saw Dr Madison Arias (ophtalmologist) who said everything was fine. Patient was placed on Propranolol and Wellbutrin with no clear benefit. Tried Amitriptyline which helped in the past.  Patient was seen at ER with MRI brain showing non-specific white matter changes. Spinal tap was unremarkable.     Patient is now getting less headaches (5/ month), but still getting tingling of the R face and numbness in both legs. Off Wellbutrin, has problem sleeping. Charlene Tidwell works well with no side effects. Sleeps only 6 hrs (go to sleep at 8 PM and wakes up at 2 AM)    Seen by a neurophthalmologist at [address] [place] [address] who said everything is fine as per patient. ROS:  Per HPI-  Otherwise the remainder of ROS was negative    Social Hx  Social History     Socioeconomic History    Marital status: SINGLE     Spouse name: Not on file    Number of children: Not on file    Years of education: Not on file    Highest education level: Not on file   Tobacco Use    Smoking status: Never Smoker    Smokeless tobacco: Never Used   Substance and Sexual Activity    Alcohol use: No    Drug use: No    Sexual activity: Not Currently       Meds:  Current Outpatient Medications on File Prior to Visit   Medication Sig Dispense Refill    topiramate ER (TROKENDI XR) 50 mg capsule Take 1 Cap by mouth nightly.  27 Cap 5    ondansetron (ZOFRAN ODT) 4 mg disintegrating tablet Take 1 Tab by mouth every eight (8) hours as needed for Nausea. 12 Tab 0    indomethacin (INDOCIN) 50 mg capsule Take 50 mg by mouth three (3) times daily as needed (HEADACHE).  triamcinolone acetonide (KENALOG) 0.147 mg/gram aero topical spray       clobetasol (TEMOVATE) 0.05 % ointment       lidocaine-tetracaine 7-7 % crea       nystatin (MYCOSTATIN) 100,000 unit/gram ointment Apply  to affected area two (2) times a day. 15 g 0    nystatin (MYCOSTATIN) powder Apply  to affected area four (4) times daily. 1 Bottle 1    albuterol (PROVENTIL VENTOLIN) 2.5 mg /3 mL (0.083 %) nebulizer solution by Nebulization route every four (4) hours as needed.  buPROPion SR (WELLBUTRIN SR) 100 mg SR tablet Take 100 mg by mouth two (2) times a day. 1    propranolol (INDERAL) 10 mg tablet Take 10 mg by mouth nightly as needed. 4    QVAR REDIHALER 40 mcg/actuation HFAb inhaler Take 40 mcg by inhalation two (2) times a day. 1    hydrocortisone (HYTONE) 2.5 % topical cream Apply  to affected area two (2) times a day. 30 g 0    sodium chloride (NORMAL SALINE FLUSH) 5-10 mL by IntraVENous route daily. before and after IV abx administration      heparin, porcine, pf (HEPARIN LOCKFLUSH,PORCINE,,PF,) 10 unit/mL injection 10 Units by IntraVENous route daily. administer after ABX, NS      cefTRIAXone (ROCEPHIN) 1 gram injection 2 g by IntraVENous route. No current facility-administered medications on file prior to visit. Imaging:    CT Results (recent):  Results from Hospital Encounter encounter on 02/28/19   CT HEAD WO CONT    Narrative EXAM: CT HEAD WO CONT    INDICATION: right facial tingling    COMPARISON: None. CONTRAST: None. TECHNIQUE: Unenhanced CT of the head was performed using 5 mm images. Brain and  bone windows were generated.   CT dose reduction was achieved through use of a  standardized protocol tailored for this examination and automatic exposure  control for dose modulation. FINDINGS:  The ventricles and sulci are normal in size, shape and configuration and  midline. There is no significant white matter disease. There is no intracranial  hemorrhage, extra-axial collection, mass, mass effect or midline shift. The  basilar cisterns are open. No acute infarct is identified. The bone windows  demonstrate no abnormalities. The visualized portions of the paranasal sinuses  and mastoid air cells are clear. Impression IMPRESSION: No acute intracranial process identified       MRI Results (recent):  Results from Hospital Encounter encounter on 09/14/18   MRI BRAIN W WO CONT    Narrative EXAM: MRI BRAIN     INDICATION:   Include scan of orbits bilaterally. Rule out MS and Optic  Neuritis per Ophthalmology   Vision loss     SEQUENCES:   Sagittal T1, axial T1, T2, GRE and FLAIR; axial and coronal T1 post  enhancement; and diffusion imaging of the brain. 19 mL of Dotarem. Thin section  imaging is also performed through the orbits with and without contrast.    FINDINGS:     Orbital soft tissues, optic nerves and chiasm are normal in size, morphology,  signal intensity and enhancement. There are a few punctate white matter foci of T2 hyperintensity in the left  parietal lobe, nonspecific. Remainder of the brain including corpus callosum and  pericallosal white matter show normal signal intensity. The enhancement is  normal.      There is no evidence for mass, hemorrhage, shift, or hydrocephalus. There is no  extra-axial fluid collection. Diffusion imaging shows no diffusion restriction  to indicate acute infarct/ischemia or other process. Impression IMPRESSION: Few nonspecific left parietal white matter T2 hyperintensities. Normal appearance of the orbits. Normal diffusion and enhancement. IR Results (recent):  No results found for this or any previous visit.     VAS/US Results (recent):  [place] [facility] [place]18   DUPLEX LOWER EXT VENOUS RIGHT       Reviewed records in [facility] and media tab today    Lab Review     Lab Only on 03/20/2019   Component Date Value Ref Range Status    Vitamin B12 03/20/2019 613  232 - 1,245 pg/mL Final    Immunoglobulin A, Qt. 03/20/2019 254  87 - 352 mg/dL Final    Deamidated Gliadin Ab, IgA 03/20/2019 15  0 - 19 units Final    Comment:                    Negative                   0 - 19                     Weak Positive             20 - 30                     Moderate to Strong Positive   >30      Deamidated Gliadin Ab, IgG 03/20/2019 4  0 - 19 units Final    Comment:                    Negative                   0 - 19                     Weak Positive             20 - 30                     Moderate to Strong Positive   >30      t-Transglutaminase, IgA 03/20/2019 <2  0 - 3 U/mL Final    Comment:                               Negative        0 -  3                                Weak Positive   4 - 10                                Positive           >10   Tissue Transglutaminase (tTG) has been identified   as the endomysial antigen. Studies have demonstr-   ated that endomysial IgA antibodies have over 99%   specificity for gluten sensitive enteropathy.  t-Transglutaminase, IgG 03/20/2019 4  0 - 5 U/mL Final    Comment:                               Negative        0 - 5                                Weak Positive   6 - 9                                Positive           >9     Admission on 01/24/2019, Discharged on 01/28/2019   Component Date Value Ref Range Status    WBC 01/24/2019 7.4  3.6 - 11.0 K/uL Final    Comment: Due to mathematical rounding between the [address], and the new [employer] Hemat[employer] analyzers, the reported automated differential may vary by up to +/- 0.5% per cell line. This finding may produce a result that is 100% +/- 3%, which is clinically insignificant.       RBC 01/24/2019 4.22  3.80 - 5.20 M/uL Final    HGB 01/24/2019 11.9  11.5 - 16.0 g/dL Final    HCT 01/24/2019 39.2  35.0 - 47.0 % Final    MCV 01/24/2019 92.9  80.0 - 99.0 FL Final    MCH 01/24/2019 28.2  26.0 - 34.0 PG Final    MCHC 01/24/2019 30.4  30.0 - 36.5 g/dL Final    RDW 01/24/2019 14.7* 11.5 - 14.5 % Final    PLATELET 47/74/4606 786  150 - 400 K/uL Final    MPV 01/24/2019 10.8  8.9 - 12.9 FL Final    NRBC 01/24/2019 0.0  0  WBC Final    ABSOLUTE NRBC 01/24/2019 0.00  0.00 - 0.01 K/uL Final    NEUTROPHILS 01/24/2019 55  32 - 75 % Final    LYMPHOCYTES 01/24/2019 31  12 - 49 % Final    MONOCYTES 01/24/2019 9  5 - 13 % Final    EOSINOPHILS 01/24/2019 5  0 - 7 % Final    BASOPHILS 01/24/2019 0  0 - 1 % Final    IMMATURE GRANULOCYTES 01/24/2019 0  0.0 - 0.5 % Final    ABS. NEUTROPHILS 01/24/2019 4.1  1.8 - 8.0 K/UL Final    ABS. LYMPHOCYTES 01/24/2019 2.3  0.8 - 3.5 K/UL Final    ABS. MONOCYTES 01/24/2019 0.7  0.0 - 1.0 K/UL Final    ABS. EOSINOPHILS 01/24/2019 0.4  0.0 - 0.4 K/UL Final    ABS. BASOPHILS 01/24/2019 0.0  0.0 - 0.1 K/UL Final    ABS. IMM.  GRANS. 01/24/2019 0.0  0.00 - 0.04 K/UL Final    DF 01/24/2019 AUTOMATED    Final    Sodium 01/24/2019 142  136 - 145 mmol/L Final    Potassium 01/24/2019 3.9  3.5 - 5.1 mmol/L Final    Chloride 01/24/2019 114* 97 - 108 mmol/L Final    CO2 01/24/2019 22  21 - 32 mmol/L Final    Anion gap 01/24/2019 6  5 - 15 mmol/L Final    Glucose 01/24/2019 86  65 - 100 mg/dL Final    BUN 01/24/2019 11  6 - 20 MG/DL Final    Creatinine 01/24/2019 0.83  0.55 - 1.02 MG/DL Final    BUN/Creatinine ratio 01/24/2019 13  12 - 20   Final    GFR est AA 01/24/2019 >60  >60 ml/min/1.73m2 Final    GFR est non-AA 01/24/2019 >60  >60 ml/min/1.73m2 Final    Comment: Estimated GFR is calculated using the IDMS-traceable Modification of Diet in Renal Disease (MDRD) Study equation, reported for both  Americans (GFRAA) and non- Americans (GFRNA), and normalized to 1.73m2 body surface area. The physician must decide which value applies to the patient. The MDRD study equation should only be used in individuals age 25 or older. It has not been validated for the following: pregnant women, patients with serious comorbid conditions, or on certain medications, or persons with extremes of body size, muscle mass, or nutritional status.  Calcium 01/24/2019 8.0* 8.5 - 10.1 MG/DL Final    Bilirubin, total 01/24/2019 0.2  0.2 - 1.0 MG/DL Final    ALT (SGPT) 01/24/2019 27  12 - 78 U/L Final    AST (SGOT) 01/24/2019 14* 15 - 37 U/L Final    Alk. phosphatase 01/24/2019 72  45 - 117 U/L Final    Protein, total 01/24/2019 7.1  6.4 - 8.2 g/dL Final    Albumin 01/24/2019 3.1* 3.5 - 5.0 g/dL Final    Globulin 01/24/2019 4.0  2.0 - 4.0 g/dL Final    A-G Ratio 01/24/2019 0.8* 1.1 - 2.2   Final    SAMPLES BEING HELD 01/24/2019 [facility]   Final    COMMENT 01/24/2019 Add-on orders for these samples will be processed based on acceptable specimen integrity and analyte stability, which may vary by analyte. Final    WBC 01/25/2019 6.6  3.6 - 11.0 K/uL Final    Comment: Due to mathematical rounding between the [address], and the new [employer] Hemat[employer] analyzers, the reported automated differential may vary by up to +/- 0.5% per cell line. This finding may produce a result that is 100% +/- 3%, which is clinically insignificant.       RBC 01/25/2019 3.74* 3.80 - 5.20 M/uL Final    HGB 01/25/2019 10.7* 11.5 - 16.0 g/dL Final    HCT 01/25/2019 34.0* 35.0 - 47.0 % Final    MCV 01/25/2019 90.9  80.0 - 99.0 FL Final    MCH 01/25/2019 28.6  26.0 - 34.0 PG Final    MCHC 01/25/2019 31.5  30.0 - 36.5 g/dL Final    RDW 01/25/2019 14.6* 11.5 - 14.5 % Final    PLATELET 29/53/5116 523  150 - 400 K/uL Final    MPV 01/25/2019 9.9  8.9 - 12.9 FL Final    NRBC 01/25/2019 0.0  0  WBC Final    ABSOLUTE NRBC 01/25/2019 0.00  0.00 - 0.01 K/uL Final    Sodium 01/25/2019 141  136 - 145 mmol/L Final    Potassium 01/25/2019 3.7  3.5 - 5.1 mmol/L Final    Chloride 01/25/2019 113* 97 - 108 mmol/L Final    CO2 01/25/2019 21  21 - 32 mmol/L Final    Anion gap 01/25/2019 7  5 - 15 mmol/L Final    Glucose 01/25/2019 107* 65 - 100 mg/dL Final    BUN 01/25/2019 11  6 - 20 MG/DL Final    Creatinine 01/25/2019 0.87  0.55 - 1.02 MG/DL Final    BUN/Creatinine ratio 01/25/2019 13  12 - 20   Final    GFR est AA 01/25/2019 >60  >60 ml/min/1.73m2 Final    GFR est non-AA 01/25/2019 >60  >60 ml/min/1.73m2 Final    Calcium 01/25/2019 8.1* 8.5 - 10.1 MG/DL Final    IgG, Quant, CSF 01/26/2019 3.4  0.0 - 8.6 mg/dL Final    Albumin, CSF 01/26/2019 17  11 - 48 mg/dL Final    Albumin, serum 01/26/2019 3.5  3.5 - 5.5 g/dL Final    Comment: (NOTE)  Performed At: [address] [id number]  Monico Damon MD OU:[id number]      Immunoglobulin G, Qt. 01/26/2019 1,437  700 - 1,600 mg/dL Final    Comment: (NOTE)  Performed At: [address] [id number]  Monico Damon MD ZU:[id number]      IgG/Alb ratio, CSF 01/26/2019 0.20  0.00 - 0.25   Final    CSF IgG Index 01/26/2019 0.5  0.0 - 0.7   Final    IgG Synthesis Rate, CSF 01/26/2019 SEE COMMENT  NEG 9.9 TO +3.3 mg/day Final    Comment: (NOTE)  RESULT:-4.0      Oligoclonal Bands 01/26/2019 Comment    Final    Comment: (NOTE)  Zero (0) oligoclonal bands were observed in the CSF. Interpretation:  Criteria for Positivity: Four (4) or more oligoclonal bands  observed  only in the CSF have been shown to be most consistent with  MS  using our method. [Adithya AS, Sung EL, Miriam WOODY, and  Dioni JA: Cerebrospinal Fluid Oligoclonal Bands in the  Diagnosis  of Multiple Sclerosis. Am J Clin Pathol 120(5):672-675,  2003].   Oligoclonal bands that are present only in the CSF have been  associated with a variety of inflammatory brain diseases such as  multiple sclerosis (MS), subacute encephalitis, neurosyphilis,  etc.  Increased IgG in the CSF is not specific for MS, but is an  indication  of chronic neural inflammation. Clinical correlation  indicated. Approximately 2-3% of clinically confirmed MS patients show little  or no evidence of oligoclonal bands in the CSF; however  oligoclonal  bands may develop as the disease progresses. Oligoclonal Banding testing performed using Isoelectric Focusing  (                           IEF) and immunoblotting methodology. Performed At: [address] [id number]  Bharath Gtz MD VW:[id number]      CSF/Serum Alb.  Index 01/26/2019 5  0 - 8   Final    Comment: (NOTE)          Relationship to blood-brain barrier:           Consistent with an intact barrier        <9           Slight impairment                   9 -  14           Moderate impairment                14 -  30           Severe impairment                  30 - 100           Complete breakdown                     >100  Performed At: [address] [id number]  Bharath Gtz MD [id number]      CSF TUBE NO. 01/26/2019 3    Final    CSF COLOR 01/26/2019 COLORLESS  COL   Final    CSF APPEARANCE 01/26/2019 CLEAR  CLEAR   Final    CSF RBCS 01/26/2019 6* 0 /cu mm Final    CSF WBCS 01/26/2019 0  0 - 5 /cu mm Final    Tube No. 01/26/2019 1    Final    Glucose,CSF 01/26/2019 55  40 - 70 MG/DL Final    Tube No. 01/26/2019 1    Final    Protein,CSF 01/26/2019 38  15 - 45 MG/DL Final    P93 Ab, IgG 01/26/2019 Absent   Final    P66 Ab, IgG 01/26/2019 Absent   Final    P58 Ab, IgG 01/26/2019 Absent   Final    P45 Ab, IgG 01/26/2019 Absent   Final    P41 Ab, IgG 01/26/2019 Absent   Final    P39 Ab, IgG 01/26/2019 Absent   Final    P30 Ab, IgG 01/26/2019 Absent   Final    P28 Ab, IgG 01/26/2019 Absent   Final    P23 Ab, IgG 01/26/2019 Absent   Final    P18 Ab, IgG 01/26/2019 Absent   Final  Lyme IgG WB Interp. 01/26/2019 NEGATIVE     Final    Comment: (NOTE)  Positive: 5 of the following bands: Borrelia-specific           bands: M65,03,94,10,70,83,35,25,66, and 93. Negative: No bands or banding patterns which do not meet           positive criteria. Note: For investigational use only.  P41 Ab, IgM 01/26/2019 Absent   Final    P39 Ab, IgM 01/26/2019 Absent   Final    P23 Ab, IgM 01/26/2019 Absent   Final    Lyme IgM WB Interp. 01/26/2019 NEGATIVE     Final    Comment: (NOTE)  Note: An equivocal or positive EIA result followed by a negative  Western Blot result is considered NEGATIVE. An equivocal or  positive EIA result followed by a positive Western Blot is considered  POSITIVE by the CDC. Positive: 2 of the following bands: 23,39 or 41  Negative: No bands or banding patterns which do not meet positive  criteria. Criteria for positivity are those recommended by CDC/ASTPHLD.  p23=Osp C, h72=chnxsxlei  Criteria for positivity are those recommended by CDC/ASTPHLD.  p23=Osp C, l13=[id number]  Performed At: [address] [id number]  Catrina Rodriguez MD TF:[id number]     [address] [facility] 01/26/2019 TUBE 4 OF CSF(2cc)   Final    COMMENT 01/26/2019 Add-on orders for these samples will be processed based on acceptable specimen integrity and analyte stability, which may vary by analyte.     Final    B burgdorferi C6 Peptide 01/27/2019 <0.91  0.00 - 0.90 index Final    Comment: (NOTE)                                 Negative         <0.91                                 Equivocal  0.91 - 1.09                                 Positive         >1.09  Performed At: [facility]  [employer] [address] [id number]  Catrina Rodriguez MD KX:[id number]      Lyme Ab, IgG/IgM 01/27/2019 <0.91  0.00 - 0.90 ISR Final    Comment: (NOTE)                                 Negative         <0.91                                 Equivocal  0.91 - 1.09 Positive         >1.09  Performed At: [address] [id number]  Michele Garcia MD MX:[id number]           Objective:       Exam:  Visit Vitals  /70 (BP 1 Location: Right arm, BP Patient Position: Sitting)   Pulse 78   Resp 16   Ht 5' 6\" (1.676 m)   Wt 103.4 kg (228 lb)   SpO2 98%   BMI 36.80 kg/m²     Gen: Awake, alert, follows commands  Appropriate appearance, normal speech. Oriented to all spheres. No visual field defect on confrontation exam.  Full eyes movement, with no nystagmus, no diplopia, no ptosis. Normal gag and swallow. All remaining cranial nerves were normal  Motor function: 5/5 in all extremities  Sensory: intact to LT, PP and JPS  Good FTN and HTS   Gait: Normal    Assessment:       ICD-10-CM ICD-9-CM    1. Intractable migraine with aura with status migrainosus G43. 111 346.03    2. Paresthesia R20.2 782.0        Paresthesia related to Trokendi        Plan:   1. Discussed the possibility that paresthesias are related to Trokendi, but patient's headache is well controlled right now with the medication that patient is reluctant in stopping the medication.  Patient would like to continue Trokendi XR 50 mg QHS  2. Continue Cambia prn to abort headache  3. Continue headache diary and list that can trigger headache      Has a follow up in Aug 2019          Burke Sahu MD  Diplomate, [address] [place] [address] Rd., [address] of Psychiatry and Neurology  Diplomate, Neuromuscular Medicine  Diplomate, American Board of Electrodiagnostic Medicine

## 2019-04-16 ENCOUNTER — TELEPHONE (OUTPATIENT)
Dept: FAMILY MEDICINE CLINIC | Age: 39
End: 2019-04-16

## 2019-04-16 NOTE — TELEPHONE ENCOUNTER
----- Message from Sil Holley sent at 4/16/2019 11:48 AM EDT -----  Regarding: Dr. Abimael Hanna / Shaheed Gutierrez: [phone]  Pt requested a return call regarding a flare-up of lyme's disease.

## 2019-04-27 ENCOUNTER — TELEPHONE (OUTPATIENT)
Dept: FAMILY MEDICINE CLINIC | Age: 39
End: 2019-04-27

## 2019-04-27 NOTE — TELEPHONE ENCOUNTER
Received call from on call service, Pt complaining of tick bite. Called pt, pt complaining of tick on inside of R leg noticed this morning, body aches, and chills. Pt denies fever. No sick contacts. Pt states she was treated for lyme disease on February of this year. Denies any redness, swelling, dizziness, nausea, vomiting, or focal weakness. Recommended pt go to urgent care today to get sooner treatment as our clinic is closed today. Pt stated her insurance is not accepted at urgent care and that she would make an appointment with our clinic tomorrow morning.      Tristian Crystal MD  2:49 PM

## 2019-05-03 ENCOUNTER — OFFICE VISIT (OUTPATIENT)
Dept: FAMILY MEDICINE CLINIC | Age: 39
End: 2019-05-03

## 2019-05-03 VITALS — RESPIRATION RATE: 18 BRPM | HEIGHT: 66 IN | BODY MASS INDEX: 36.32 KG/M2 | WEIGHT: 226 LBS
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
## 2019-05-03 LAB
HCG URINE, QL. (POC): NEGATIVE
VALID INTERNAL CONTROL?: YES

## 2019-05-03 RX ORDER — DOXYCYCLINE 100 MG/1
100 TABLET ORAL 2 TIMES DAILY
Qty: 20 TAB | Refills: 0 | Status: SHIPPED | OUTPATIENT
Start: 2019-05-03 | End: 2019-05-13

## 2019-05-03 RX ORDER — ONDANSETRON 4 MG/1
4 TABLET, ORALLY DISINTEGRATING ORAL
Qty: 8 TAB | Refills: 0 | Status: SHIPPED | OUTPATIENT
Start: 2019-05-03 | End: 2019-06-07 | Stop reason: SDUPTHER

## 2019-05-03 NOTE — PROGRESS NOTES
Chief Complaint   Patient presents with    Leg Pain     bilateral times one week. States she was bitten by a tick. 1. Have you been to the ER, urgent care clinic since your last visit? Hospitalized since your last visit? No    2. Have you seen or consulted any other health care providers outside of the [address] [place] [address] since your last visit? Include any pap smears or colon screening.  No

## 2019-05-03 NOTE — PATIENT INSTRUCTIONS
Tick Bite: Care Instructions  Your Care Instructions    Ticks are small spiderlike animals. They bite to fasten themselves onto your skin and feed on your blood. Ticks can carry diseases. But most ticks do not carry diseases, and most tick bites do not cause serious health problems. Some people may have an allergic reaction to a tick bite. This reaction may be mild, with symptoms like itching and swelling. In rare cases, a severe allergic reaction may occur. Most of the time, all you need to do for a tick bite is relieve any symptoms you may have. Follow-up care is a key part of your treatment and safety. Be sure to make and go to all appointments, and call your doctor if you are having problems. It's also a good idea to know your test results and keep a list of the medicines you take. How can you care for yourself at home? · Put ice or a cold pack on the bite for 15 to 20 minutes once an hour. Put a thin cloth between the ice and your skin. · Try an over-the-counter medicine to relieve itching, redness, swelling, and pain. Be safe with medicines. Read and follow all instructions on the label. ? Take an antihistamine medicine, such as a nondrowsy one like loratadine (Claritin) or one that might make you sleepy like diphenhydramine (Benadryl). These medicines may help relieve itching, redness, and swelling. ? Use a spray of local anesthetic that contains benzocaine, such as Solarcaine. It may help relieve pain. If your skin reacts to the spray, stop using it. ? Put calamine lotion on the skin. It may help relieve itching. To avoid tick bites  · Avoid ticks:  ? Learn where ticks are found in your community, and stay away from those areas if possible. ? Cover as much of your body as possible when you work or play in grassy or wooded areas. ? Use insect repellents, such as products containing DEET. You can spray them on your skin.   ? Take steps to control ticks on your property if you live in an area where Lyme disease occurs. Clear leaves, brush, tall grasses, woodpiles, and stone fences from around your house and the edges of your yard or garden. This may help get rid of ticks. · When you come in from outdoors, check your body for ticks, including your groin, head, and underarms. The ticks may be about the size of a sesame seed. If no one else can help you check for ticks on your scalp, comb your hair with a fine-tooth comb. · If you find a tick, remove it quickly. Use tweezers to grasp the tick as close to its mouth (the part in your skin) as possible. Slowly pull the tick straight out--do not twist or yank--until its mouth releases from your skin. If part of the tick stays in the skin, leave it alone. It will likely come out on its own in a few days. · Ticks can come into your house on clothing, outdoor gear, and pets. These ticks can fall off and attach to you. ? Check your clothing and outdoor gear. Remove any ticks you find. Then put your clothing in a clothes dryer on high heat for 1 hour to kill any ticks that might remain. ? Check your pets for ticks after they have been outdoors. · When hiking in the woods, carry a small dry jar or ziplock bag. If you find a tick on your body, remove the tick and put it in the jar or bag. Store the container in the freezer so you can give it to your doctor if symptoms develop. The tick can be tested to learn whether it is carrying the bacteria that cause Lyme disease. When should you call for help? Call 911 anytime you think you may need emergency care. For example, call if:    · You have symptoms of a severe allergic reaction. These may include:  ? Sudden raised, red areas (hives) all over your body. ? Swelling of the throat, mouth, lips, or tongue. ? Trouble breathing. ? Passing out (losing consciousness).  Or you may feel very lightheaded or suddenly feel weak, confused, or restless.    Call your doctor now or seek immediate medical care if:    · You have signs of infection, such as:  ? Increased pain, swelling, warmth, or redness around the bite. ? Red streaks leading from the bite. ? Pus draining from the bite. ? A fever.    Watch closely for changes in your health, and be sure to contact your doctor if:    · You develop a new rash.     · You have joint pain.     · You are very tired.     · You have flu-like symptoms.     · You have symptoms for more than 1 week. Where can you learn more? Go to http://sarmad-farhana.info/. Enter F364 in the search box to learn more about \"Tick Bite: Care Instructions. \"  Current as of: September 23, 2018  Content Version: 11.9  © 5909-0656 [employer]. Care instructions adapted under license by [employer] (which disclaims liability or warranty for this information). If you have questions about a medical condition or this instruction, always ask your healthcare professional. [address] any warranty or liability for your use of this information.

## 2019-05-03 NOTE — PROGRESS NOTES
[address] [facility] [address] [place] [address]   Office [phone], Fax [phone]    Subjective:   Elin Haas is a 45 y.o. female   CC: Tick bite  History provided by patient     HPI:  Tick bite:  - Symptoms for about ~10 days, started with body ache. Found a tick on Wednesday (4/24) and scratched the body off but head was attached on R inner thigh (Thursday)  -Has associated nausea, neuropathy from knees to feet. - No fever or chills  - Tolerates PO, no urinary or bowel habit changes  -Rash: Had picc line with CTX in February for suspected Lyme Uveitis and rash improved, however, still there.   -Tried triamcinolone for itching, prescribed \" a few months back\"  -Tried tylenol, indocin, and motrin but body ache persists.  -Tried doxycyline but had GI upset in the past, willing to try again  - Of note, was hospitalized 1/24-1/28 for possible Lyme Uveitis and tx with IV CTX. She was noted to have positive Lyme Ab 1.02 and 6IgG bands on western blot. Past Medical History:   Diagnosis Date    Arthritis     Depression     Headache     Ill-defined condition     scoliosis, gall stones, anemia    Seizures (HCC)      Allergies   Allergen Reactions    Other Food Swelling     All nuts except peanuts    Tramadol Nausea and Vomiting    Nut - Unspecified Swelling    [place] Swelling     Swollen lips     Current Outpatient Medications on File Prior to Visit   Medication Sig Dispense Refill    TROKENDI XR 50 mg capsule TAKE 1 CAPSULE BY MOUTH EVERY DAY AT NIGHT 30 Cap 5    ondansetron (ZOFRAN ODT) 4 mg disintegrating tablet Take 1 Tab by mouth every eight (8) hours as needed for Nausea. 12 Tab 0    indomethacin (INDOCIN) 50 mg capsule Take 50 mg by mouth three (3) times daily as needed (HEADACHE).       triamcinolone acetonide (KENALOG) 0.147 mg/gram aero topical spray       clobetasol (TEMOVATE) 0.05 % ointment       lidocaine-tetracaine 7-7 % crea       nystatin (MYCOSTATIN) 100,000 unit/gram ointment Apply  to affected area two (2) times a day. 15 g 0    nystatin (MYCOSTATIN) powder Apply  to affected area four (4) times daily. 1 Bottle 1    albuterol (PROVENTIL VENTOLIN) 2.5 mg /3 mL (0.083 %) nebulizer solution by Nebulization route every four (4) hours as needed.  buPROPion SR (WELLBUTRIN SR) 100 mg SR tablet Take 100 mg by mouth two (2) times a day. 1    propranolol (INDERAL) 10 mg tablet Take 10 mg by mouth nightly as needed. 4    QVAR REDIHALER 40 mcg/actuation HFAb inhaler Take 40 mcg by inhalation two (2) times a day. 1    hydrocortisone (HYTONE) 2.5 % topical cream Apply  to affected area two (2) times a day. 30 g 0    diclofenac potassium (CATAFLAM) 50 mg tablet Take one tablet by mouth  at onset of headache, may take another 1 tab in 4 hrs if needed with meals. 30 Tab 1    sodium chloride (NORMAL SALINE FLUSH) 5-10 mL by IntraVENous route daily. before and after IV abx administration      heparin, porcine, pf (HEPARIN LOCKFLUSH,PORCINE,,PF,) 10 unit/mL injection 10 Units by IntraVENous route daily. administer after ABX, NS       No current facility-administered medications on file prior to visit.       Family History   Problem Relation Age of Onset   [facility] Arthritis-rheumatoid Mother     Depression Father     Cancer Maternal Aunt     Cancer Maternal Uncle     Cancer Paternal Aunt     Cancer Paternal Uncle      Social History     Socioeconomic History    Marital status: SINGLE     Spouse name: Not on file    Number of children: Not on file    Years of education: Not on file    Highest education level: Not on file   Tobacco Use    Smoking status: Never Smoker    Smokeless tobacco: Never Used   Substance and Sexual Activity    Alcohol use: No    Drug use: No    Sexual activity: Not Currently     Past Surgical History:   Procedure Laterality Date    HX  SECTION      x 4    HX HERNIA REPAIR      HX TUBAL LIGATION         Patient Active Problem List   Diagnosis Code    Severe obesity ([facility] E66.01    Intractable migraine with aura with status migrainosus G43. 111    Chronic anterior uveitis of right eye H20.11    Uveitis H20.9           Review of Systems   Constitutional: Negative for chills and fever. Eyes: Negative for blurred vision and pain. Respiratory: Negative for cough and wheezing. Cardiovascular: Negative for chest pain and palpitations. Gastrointestinal: Positive for nausea. Negative for abdominal pain, blood in stool and vomiting. Genitourinary: Negative for dysuria and hematuria. Musculoskeletal: Positive for joint pain and myalgias. Negative for falls. Skin: Positive for itching and rash. Neurological: Negative for focal weakness and headaches. Objective:     Visit Vitals  Resp 18   Ht 5' 6\" (1.676 m)   Wt 226 lb (102.5 kg)   BMI 36.48 kg/m²        Physical Exam   Constitutional: She is oriented to person, place, and time. She appears well-developed and well-nourished. No distress. HENT:   Head: Normocephalic and atraumatic. Cardiovascular: Normal rate and regular rhythm. No murmur heard. Pulmonary/Chest: Effort normal and breath sounds normal.   Abdominal: Soft. Bowel sounds are normal.   Musculoskeletal: Normal range of motion. She exhibits no edema, tenderness or deformity. Neurological: She is alert and oriented to person, place, and time. Skin: Rash noted. She is not diaphoretic. Bilateral inner thighs with erythematous patches with distant margins. No pustules or vesicles. Pertinent Labs/Studies:      Assessment and orders:     Lorna Enriquez is a 45 y.o. BLACK OR  female     1. Tick bite, initial encounter  DDx: Lyme disease, ehrlichiosis, anaplasmosis, babesiosis, and RMSF  10 days of myalgia, nausea, neuropathy, and worsening rash. Has history of  Equivocal Lyme testing, tx with IV CTX for possible lyme uveitis.  Patient did have some GI upset with doxycycline in the past but is willing to try again  - UPT negative, can take doxy  - Take doxycycline with a full glass of water and stay upright for 30 minutes to reduce GI upset   - Can consider Amoxicillin as alternative, however, will not cover all of the tick-borne diseases  - May need doxy longer if not lyme or if symptoms fail to improve  - Will give additional Zofran for nausea  - AMB POC URINE PREGNANCY TEST, VISUAL COLOR COMPARISON  - doxycycline (ADOXA) 100 mg tablet; Take 1 Tab by mouth two (2) times a day for 10 days. Dispense: 20 Tab; Refill: 0  - LYME AB TOTAL W/RFLX W BLOT  - ondansetron (ZOFRAN ODT) 4 mg disintegrating tablet; Take 1 Tab by mouth every eight (8) hours as needed for Nausea. Dispense: 8 Tab; Refill: 0      Follow up: 2 weeks for f/u    I have reviewed patient medical and social history and medications. I have reviewed pertinent labs results and other data. I have discussed the diagnosis with the patient and the intended plan as seen in the above orders. The patient has received an after-visit summary and questions were answered concerning future plans. I have discussed medication side effects and warnings with the patient as well.     Patient discussed and seen with Dr. Aaron Olivera, Attending Physician    Isa Byrne MD  [address] Medicine Resident  05/03/19

## 2019-05-03 NOTE — LETTER
[address] [facility] 
Zenaida Abernathy [address] 
[phone] Siri Shorten Date: 5/3/2019 Sex: female [address] [place] [id number] YOB: 1980 Age: 45 y.o. To Whom It May Concern: Ms. Mesha Wang can return to work effective 5/3/19. No restrictions. Sincerely, 
 
 
Tisha Mendieta MD 
Family Medicine Resident

## 2019-05-06 LAB — B BURGDOR IGG+IGM SER-ACNC: <0.91 ISR (ref 0–0.9)

## 2019-05-17 ENCOUNTER — TELEPHONE (OUTPATIENT)
Dept: FAMILY MEDICINE CLINIC | Age: 39
End: 2019-05-17

## 2019-05-17 NOTE — TELEPHONE ENCOUNTER
----- Message from Meg Anaya sent at 5/17/2019  9:52 AM EDT -----  Regarding: Dr. April Leonard   Pt is currently requesting other recommendations for her Scoliosis after starting physical therapy. Best contact number is [phone].

## 2019-05-22 ENCOUNTER — TELEPHONE (OUTPATIENT)
Dept: FAMILY MEDICINE CLINIC | Age: 39
End: 2019-05-22

## 2019-05-24 NOTE — TELEPHONE ENCOUNTER
Contacted pt at # provided. No answer, name on voicemail so left pt a message. This is my 2nd attempt at reaching her. If she calls back, please advise her to make an appt to discuss scoliosis.      Keith Rubio MD

## 2019-05-28 ENCOUNTER — TELEPHONE (OUTPATIENT)
Dept: FAMILY MEDICINE CLINIC | Age: 39
End: 2019-05-28

## 2019-05-28 NOTE — TELEPHONE ENCOUNTER
Per Dr. Mortimer Jakob patient needs to schedule an appointment to have forms from [address] [place] [address] filled out. When patient comes to appointment, patient needs to bring nebulizer medication/treatment with her.

## 2019-05-28 NOTE — TELEPHONE ENCOUNTER
Patient is returning a call, she states she was speaking to either a nurse or Dr. Rickey Camacho regarding [employer] paperwork she has to  today. No notes in the system as to who she spoke to. Please call the patient back asap with status of [employer] forms.

## 2019-06-06 ENCOUNTER — TELEPHONE (OUTPATIENT)
Dept: RHEUMATOLOGY | Age: 39
End: 2019-06-06

## 2019-06-06 NOTE — TELEPHONE ENCOUNTER
I called and confirmed with the patient on 6/6/2019  for their next day 6/7/2019  appointment to arrive 30 minutes before their appointment to fill out office paperwork. SDH. 

## 2019-06-07 ENCOUNTER — OFFICE VISIT (OUTPATIENT)
Dept: RHEUMATOLOGY | Age: 39
End: 2019-06-07

## 2019-06-07 VITALS
DIASTOLIC BLOOD PRESSURE: 78 MMHG | TEMPERATURE: 98.1 F | BODY MASS INDEX: 35.84 KG/M2 | RESPIRATION RATE: 18 BRPM | HEIGHT: 66 IN | HEART RATE: 71 BPM | SYSTOLIC BLOOD PRESSURE: 119 MMHG | WEIGHT: 223 LBS

## 2019-06-07 DIAGNOSIS — R76.8 FALSE-POSITIVE SEROLOGICAL TEST RESULT: ICD-10-CM

## 2019-06-07 DIAGNOSIS — M79.7 FIBROMYALGIA: Primary | ICD-10-CM

## 2019-06-07 DIAGNOSIS — G47.33 OSA (OBSTRUCTIVE SLEEP APNEA): ICD-10-CM

## 2019-06-07 DIAGNOSIS — F32.9 MAJOR DEPRESSION, CHRONIC: ICD-10-CM

## 2019-06-07 DIAGNOSIS — R21 GROIN RASH: ICD-10-CM

## 2019-06-07 NOTE — LETTER
6/7/19 Patient: Matthew Sanchez YOB: 1980 Date of Visit: 6/7/2019 Latia Kilgore, [facility] [address] [address] VIA In Basket Dear Latia Kilgore DO, Thank you for referring Ms. Amee Hernandez to [address] [place] [address] for evaluation. My notes for this consultation are attached. If you have questions, please do not hesitate to call me. I look forward to following your patient along with you.  
 
 
Sincerely, 
 
Daily Mena MD

## 2019-06-07 NOTE — PROGRESS NOTES
REASON FOR VISIT    This is the initial evaluation for Ms. Juventino Mckenna a 45 y.o. African American female for question of an inflammatory arthritis. The patient is referred to the [facility] at the request of Dr. Lucho Gonzales. HISTORY OF PRESENT ILLNESS      I have reviewed and summarized old records from [facility] and [address] [place] [address]    She has a history of depression and was suicidally. In 2018, she developed right eye blurred vision. In 8/14/2018, she saw Dr. Sarai Goyal, ophthalmology, who noted on exam, Dr. Gregoria Roman note \"optic disc had no edema was good color with no vascularization. he vitreous was clear.  The macula was normal with no edema the cup-to-disc size was 0.8, the external eye there was blepharitis, 1+ cell was found in the iris.  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 \" She was treated with topical steroids. In 9/14/2018, MRI Brain with and without contrast showed Few nonspecific left parietal white matter T2 hyperintensities. Normal appearance of the orbits. Normal diffusion and enhancement. In 10/08/2018, she saw neurology, Dr. Lieutenant Patterson for right frontotemporal headaches and periorbital pain. In 10/31/2018, labs showed negative CANDIDA IFA    In 12/13/2018, labs showed WBC 5.2, lymphocytes 1.6, Hct 34.8%, platelets 870,331, ESR 25 mm/hr, hsCRP 2.93 mg/L, positive Lyme IgM, negative VDRL, FTA-ABS, rheumatoid factor, HIV, Western Blot although positive IgG and IgMs. In 12/26/2018, she was treated with doxycyline but developed dizziness and GI symptoms so stopped it. She was then admitted to the hospital and was treated with IV ceftriaxone empirically. In 1/24/2019, labs showed WBC 7.4, lymphocytes 2.3, Hct 39.2%, platelets 060,910, creatinine 0.83 mg/dL, eGFR >60, albumin 4.0 g/dL, ALK 72 U/L, ALT 27 U/L, AST 14 U/L.     In 1/26/2019, Lumbar puncture showed 0 WBC, 6 RBCs, protein 38 (15-45), glucose 55 (40-70), negative Lyme Western Blot, oligoclonal bands    In 1/27/2019, labs showed negative Lyme Ab, Virginia Felton    In 2/07/2019, she saw Dr. Courtney Donaldson who felt that her positive Lyme Ab initially was a false positive since serial labs were negative. She felt that Lyme was unlikely. At that point, she planned to disconinue Ceftriaxone if her neurophthalmology exam was negative. In 2/28/2019, CT Head without contrast showed The ventricles and sulci are normal in size, shape and configuration and midline. There is no significant white matter disease. There is no intracranial hemorrhage, extra-axial collection, mass, mass effect or midline shift. The basilar cisterns are open. No acute infarct is identified. The bone windows demonstrate no abnormalities. The visualized portions of the paranasal sinuses and mastoid air cells are clear. In 3/04/2019, she saw [facility] neurophthalmology, Dr. Lolly Mayorga. No evidence of  right optic neuropathy. In 5/03/2019, she saw PCP complaining of a tick bite on 4/24/2019 associated with body ache. She was given another course of doxycyline. In 5/30/2019, labs showed negative Lyme Ab    She follows with neurology, Dr. Laura Bronson. Today, since May, she complains of diffuse body pain as numbness and tingling in her legs and feet, sharp pain in her body that is worse when she moves. The pain is constant. if she stands up, she has worsening pain in her legs, feet, and back. If she is on her knees, she has a sharp pain in her knees and hips. If she lays on either side, she has numbness. She notes swelling in her hand sand feet that is constant. She cannot take off her rings. She has aching and numb pain in her hands. She cannot make a fist due to pain. She had received received a cortisone injection on 5/22/2019 by orthopedic which helped her elbow. Tylenol and NSAIDs do not help. A hot shower and laying on her back helps.  She has stiffness in her back and legs in the morning lasting 20 minutes. She feels that Ceftriaxone helped her resolve her swelling but not her pain. She has inguinal rash for the past year. She has not seen a dermatologist due to everyone being booked. She had discussed with her PCP and urgent care which was treated with topicals and powders without benefit. She has depression which reports has not been well controlled. Her fiance has fibromyalgia and she is modifying his diet. Therapy History includes:  Current DMARD therapy includes: none  Prior DMARD therapy includes: none  The following DMARDs have been ineffective: none  The following DMARDs were stopped because of side effects: none    REVIEW OF SYSTEMS    A 15 point review of systems was performed and summarized below. The questionnaire was reviewed with the patient and scanned into the patient's medical record.     General: endorses fatigue, weakness, denies recent weight gain, recent weight loss,  fever, night sweats  Musculoskeletal: endorses joint pain, joint swelling, morning stiffness (lasting 20 minutes), muscle pain  Ears: denies ringing in ears, loss of hearing, deafness  Eyes: endorses dryness, foreign body sensation, denies pain, redness, loss of vision, double vision, blurred vision  Mouth: endorses increased dental caries, denies sore tongue, oral ulcers, bleeding gums, loss of taste, dryness  Nose: denies nosebleeds, loss of smell, nasal ulcers  Throat: denies frequent sore throats, hoarseness, difficulty in swallowing, pain in jaw while chewing  Neck: denies swollen glands, tender glands  Cardiopulmonary: endorses wheezing, denies pain in chest, irregular heart beat, sudden changes in heart beat, shortness of breath, difficulty breathing at night, dry cough, productive cough, coughing of blood  Gastrointestinal: endorses nausea, heartburn, stomach pain relieved by food, persistent diarrhea, denies vomiting of blood/\"coffee grounds\", jaundice, increasing constipation, blood in stools, black stools  Genitourinary: endorses nocturia, frequent urination, rash, denies difficult urination, pain or burning on urination, blood in urine, cloudy urine, pus in urine, genital discharge, vaginal dryness, ulcers, sexual difficulties   Hematologic: denies anemia, bleeding tendency, blood clots  Skin: endorses rash, redness, skin tightness, denies easy bruising, sun sensitive, hives, nodules/bumps, hair loss, color changes of hands or feet in the cold (Raynaud's)  Neurologic: endorses headaches, dizziness, muscle weakness, numbness or tingling in hands/feet, memory loss  Psychiatric: endorses depression, excessive worries, denies PTSD, Bipolar  Sleep: endorses snoring, daytime somnolence, difficulty falling asleep, difficulty staying asleep, poor sleep (4 hours), denies apnea    PAST MEDICAL HISTORY    She has a past medical history of Anxiety, Arthritis, Depression, Headache, Ill-defined condition, and Seizures ([facility] [address] FAMILY HISTORY    Her family history includes Arthritis-rheumatoid in her mother; Cancer in her maternal aunt, maternal grandfather, maternal uncle, paternal aunt, and paternal uncle; Depression in her father and mother; Diabetes in her father; Heart Attack in her maternal grandmother. SOCIAL HISTORY    She reports that she has never smoked. She has never used smokeless tobacco. She reports that she does not drink alcohol or use drugs.     GYNECOLOGIC HISTORY     7, Para 5, Living 5, Miscarriage 1 @ 3 weeks    She denies severe pre-eclampsia, eclampsia or placental insufficiency    HEALTH MAINTENANCE    Immunizations  Immunization History   Administered Date(s) Administered    Influenza Vaccine (Quad)  2018     MEDICATIONS    Current Outpatient Medications   Medication Sig Dispense Refill    TROKENDI XR 50 mg capsule TAKE 1 CAPSULE BY MOUTH EVERY DAY AT NIGHT 30 Cap 5    diclofenac potassium (CATAFLAM) 50 mg tablet Take one tablet by mouth at onset of headache, may take another 1 tab in 4 hrs if needed with meals. 30 Tab 1    ondansetron (ZOFRAN ODT) 4 mg disintegrating tablet Take 1 Tab by mouth every eight (8) hours as needed for Nausea. 12 Tab 0    indomethacin (INDOCIN) 50 mg capsule Take 50 mg by mouth three (3) times daily as needed (HEADACHE).  triamcinolone acetonide (KENALOG) 0.147 mg/gram aero topical spray       clobetasol (TEMOVATE) 0.05 % ointment       lidocaine-tetracaine 7-7 % crea       nystatin (MYCOSTATIN) 100,000 unit/gram ointment Apply  to affected area two (2) times a day. 15 g 0    nystatin (MYCOSTATIN) powder Apply  to affected area four (4) times daily. 1 Bottle 1    albuterol (PROVENTIL VENTOLIN) 2.5 mg /3 mL (0.083 %) nebulizer solution by Nebulization route every four (4) hours as needed.  buPROPion SR (WELLBUTRIN SR) 100 mg SR tablet Take 100 mg by mouth two (2) times a day. 1    propranolol (INDERAL) 10 mg tablet Take 10 mg by mouth nightly as needed. 4    QVAR REDIHALER 40 mcg/actuation HFAb inhaler Take 40 mcg by inhalation two (2) times a day. 1    hydrocortisone (HYTONE) 2.5 % topical cream Apply  to affected area two (2) times a day. 30 g 0    sodium chloride (NORMAL SALINE FLUSH) 5-10 mL by IntraVENous route daily. before and after IV abx administration      heparin, porcine, pf (HEPARIN LOCKFLUSH,PORCINE,,PF,) 10 unit/mL injection 10 Units by IntraVENous route daily. administer after ABX, NS         ALLERGIES    Allergies   Allergen Reactions    Other Food Swelling     All nuts except peanuts    Tramadol Nausea and Vomiting    Nut - Unspecified Swelling    Pryor Swelling     Swollen lips       PHYSICAL EXAMINATION    Visit Vitals  /78   Pulse 71   Temp 98.1 °F (36.7 °C)   Resp 18   Ht 5' 6\" (1.676 m)   Wt 223 lb (101.2 kg)   BMI 35.99 kg/m²     Body mass index is 35.99 kg/m².     General: Patient is alert, oriented x 3, not in acute distress, mother and son at bedside    HEENT: Conjunctiva are not injected and appear moist, oral mucous membranes are moist, there are no ulcers present, there is no alopecia, neck is supple, there is no lymphadenopathy. Salivary glands are normal    Cardiovascular:  Heart is regular rate and rhythm, no murmurs. Chest:  Lungs are clear to auscultation bilaterally. Extremities:  Free of clubbing, cyanosis, edema, extremities well perfused. Neurological exam:  Muscle strength is full in upper and lower extremities. Skin exam:  There are no tophi, no psoriasis, no active Raynaud's, no livedo reticularis, no periungual erythema. Musculoskeletal exam:  A comprehensive musculoskeletal exam was performed for all joints of each upper and lower extremity and assessed for swelling, tenderness and range of motion. Pertinent results are documented as below:    18/18 tender points  Interphalangeal tenderness  Diffuse myalgia  Diffuse Allodynia  No synovitis    DATA REVIEW    Prior medical records were reviewed and are summarized as below:    Laboratory data: summarized in the HPI    Imaging: summarized in the HPI. ASSESSMENT AND PLAN    1) Fibromyalgia. her history, constellation of symptoms, and examination are consistent with a pain syndrome. Fibromyalgia is a disease characterized by chronic widespread musculoskeletal pain. Fibromyalgia is caused by abnormal processing of pain signals in the central nervous system, leading to exaggerated pain responses. Non-pharmacologic therapies such as cardiovascular exercise and Cognitive Behavioral Therapy have been shown to be of benefit ([facility], Am J Med 2009). Yuan Chi in particular has proven efficacy in the treatment of fibromyalgia Chip Hopkins, [facility] 2010). If pharmacotherapy is pursued, pregabalin (Lyrica), gabapentin (Neurontin), milnacipran (Kai Stagger), and duloxetine (Cymbalta) are FDA approved medications for the treatment of fibromyalgia.  Narcotics have not been proven to be efficacious in the treatment of fibromyalgia. In fact, narcotic use in this patient population has been observed to exacerbate depression, and may enhance the hyperalgesia which is characteristic of this condition (Leanne Say Rheum 2006). They also are at increased risk for opioid-induced hyperalgesia due predominantly to central sensitization Dimas Bey al. [place] Clin Rheumatol. 2013 Mar;19(2):72-7). Specifically, a double-blind placebo-controlled trial by Campos Rain al published in 1995 demonstrated that intravenous morphine did not reduce pain in fibromyalgia patients. A study by Jj Maldonado al published in 2003 showed that fibromyalgia patients taking oral opiates did not experience improvement in their pain at four years of follow up, and also reported increased depression over the last two years of the study. There is subsequent concern that the prolonged use of narcotics to treat fibromyalgia may cause harm to these patients Cornelia Andre, Pain 2005). Finally, opioid use in fibromyalgia had poorer symptoms and functional and occupational status compared to nonusers (Hao GONZALEZ et al. Pain Res Treat. 0984;5523:766397). We therefore recommend that narcotics be avoided in all patients with fibromyalgia. We recommend Yuan Chi stretching exercises for at least 30 minutes per day. The Arth[employer] Foundation has made a videotape of Yuan Chi that She can borrow from [employer], purchase online or watch for free on 123people. com Yuan Chi for Arthritis. We discussed treating secondary causes, such as sleep apnea, poor sleep quality, depression, anxiety, weight loss, vitamin deficiencies, such as vitamin D, and pursuing aquatherapy. I encouraged her to do Y[address]ie [address]. My recommendations were provided to her and she was informed to follow up with her primary care physician for further management of her fibromyalgia. 2) Major Depression. She reports is not controlled, which may aggravate her fibromyalgia.  She has a history of inpatient hospitalization for suicdal ideations at [address] [place] [address]. She does not have a psychiatrist or psychologist. I asked her to establish care with [facility]    3) Possible Obstructive Sleep Apnea. She endorses snoring, daytime somnolence, difficulty falling asleep, difficulty staying asleep. She should be worked up for obstructive sleep apnea as this may aggravate fibromyalgia. I defer to her PCP. 4) Inguinal Rash. This has been refractory to topicals and powders. I asked her to establish care with a dermatologist.    5) False Positive Lyme Antibody. Per infectious disease, Dr. Jd Graves, supported by negative repeated Lyme serologies, she does not nor did she have Lyme's disease. 6) Right Eye Discomfort. Per neurophthalmology, Dr. Hermelinda Garcia, she does not have optic nerve atrophy or optic neuritis. The patient voiced understanding of the aforementioned assessment and plan. Summary of plan was provided in the After Visit Summary patient instructions. I also provided education about MyChart setup and utility. TODAY'S ORDERS    No orders of the defined types were placed in this encounter.     Future Appointments   Date Time Provider Department Center   6/14/2019  2:30 PM Jodi Combs,  [facility] SCHED   8/19/2019  2:40 PM Romana Culp MD [address], [place], [facility] [facility]    Adult Rheumatology   Rheumatology Ultrasound Certified  [id number]  [address] [facility], [facility], [address]   Phone [phone]  Fax [phone]

## 2019-06-07 NOTE — PATIENT INSTRUCTIONS
FIBROMYALGIA Fibromyalgia is a disease characterized by chronic widespread musculoskeletal pain. Fibromyalgia is caused by abnormal processing of pain signals in the central nervous system, leading to exaggerated pain responses. There are functional MRI studies that have shown neuroplasticity (re-wiring) of the pain pathways in the brain. Physical and Mental Exercise The mainstay of therapy includes non-pharmacologic therapies such as cardiovascular exercise and Cognitive Behavioral Therapy which have been shown to be of benefit ([address] [facility], Am J Med 2009). Yuan Chi in particular has proven efficacy in the treatment of fibromyalgia Haritha Martin al. [facility] 2010; [phone]). Performing at least 60 minutes per day of Diajria Chyle has been shown to improve pain without medical management. Medications After discussing with your primary care and if medications are pursued, pregabalin (Lyrica), gabapentin (Neurontin), milnacipran (Bette Rugby), and duloxetine (Cymbalta) are FDA approved medications for the treatment of fibromyalgia. Narcotic Pain Medications Are BAD Narcotics, such as oxycodone, hydrocodone, morphine, dilaudid, or codeine, have not been proven to be efficacious in the treatment of fibromyalgia. In fact, narcotic use in this patient population has been observed to exacerbate depression, and may enhance the hyperalgesia which is characteristic of this condition (Daun Gear Rheum 2006). They also are at increased risk for opioid-induced hyperalgesia due predominantly to central sensitization Sheldon Bey al. [facility] [place] Clin Rheumatol. 2013 Mar;19(2):72-7). Specifically, a double-blind placebo-controlled trial by Colton Page al published in 1995 demonstrated that intravenous morphine did not reduce pain in fibromyalgia patients.  A study by Abbey James al published in 2003 showed that fibromyalgia patients taking oral opiates did not experience improvement in their pain at four years of follow up, and also reported increased depression over the last two years of the study. There is subsequent concern that the prolonged use of narcotics to treat fibromyalgia may cause harm to these patients Melissa Arias, Pain 2005). Finally, opioid use in fibromyalgia had poorer symptoms and functional and occupational status compared to nonusers (Hao GONZALEZ et al. Pain Res Treat. 0555;5334:317112). Therefore, I recommend that narcotics be avoided in all patients with fibromyalgia. My Recommendations I recommend Yuan Chi stretching exercises for at least 30 minutes per day. The Arthritis Foundation has made a videotape of Yuan Chi that you can borrow from [employer], purchase online or watch for free on [employer]. com Yuan Chi for Arthritis. I strongly recommend you to join a gym that has an indoor pool, to do aqua therapy and Yuan Chi classes. Resources include: Dorita, [employer], [employer], and the [facility]. We discussed treating secondary causes, such as sleep apnea, poor sleep quality, depression, anxiety weight loss, vitamin deficiencies, such as vitamin D, and pursuing aquatherapy.  I encourage you to do Y[address]ie [address].

## 2019-06-14 ENCOUNTER — OFFICE VISIT (OUTPATIENT)
Dept: FAMILY MEDICINE CLINIC | Age: 39
End: 2019-06-14

## 2019-06-14 VITALS
WEIGHT: 228 LBS | HEIGHT: 66 IN | TEMPERATURE: 98.3 F | RESPIRATION RATE: 17 BRPM | BODY MASS INDEX: 36.64 KG/M2 | SYSTOLIC BLOOD PRESSURE: 96 MMHG | DIASTOLIC BLOOD PRESSURE: 67 MMHG | OXYGEN SATURATION: 98 % | HEART RATE: 69 BPM

## 2019-06-14 DIAGNOSIS — M54.9 BACK PAIN, UNSPECIFIED BACK LOCATION, UNSPECIFIED BACK PAIN LATERALITY, UNSPECIFIED CHRONICITY: Primary | ICD-10-CM

## 2019-06-14 DIAGNOSIS — M79.7 FIBROMYALGIA: ICD-10-CM

## 2019-06-14 DIAGNOSIS — M41.9 SCOLIOSIS, UNSPECIFIED SCOLIOSIS TYPE, UNSPECIFIED SPINAL REGION: ICD-10-CM

## 2019-06-14 DIAGNOSIS — E66.09 OBESITY DUE TO EXCESS CALORIES, UNSPECIFIED CLASSIFICATION, UNSPECIFIED WHETHER SERIOUS COMORBIDITY PRESENT: ICD-10-CM

## 2019-06-14 NOTE — PROGRESS NOTES
HPI     Chief Complaint   Patient presents with    Other      patient wants to discuss recommedations for back surgery from Ortho doctor     Fibromyalgia      patient wants to discuss fibromyalgia with doctor     She is a 45 y.o. female who presents for back pain. Back Pain  Has scoliosis. Follow[facility] Was told her spine curvature is 60%. They are currently recommending surgery. Having a 2nd MRI 6/20/19. Would like a 2nd opinion. Has been doing PT. Fibromyalgia  States she was diagnosed with fibromyalgia by Dr. Edwin Rueda. States most of her pain is in her cervical and lower spine. States she wanted to follow up to see what can be done with this. Has an appt with Psych coming up in July. Review of Systems   Constitutional: Negative for activity change and appetite change. Musculoskeletal: Positive for back pain and myalgias. Reviewed PmHx, RxHx, FmHx, SocHx, AllgHx and updated and dated in the chart. Physical Exam:  Visit Vitals  BP 96/67   Pulse 69   Temp 98.3 °F (36.8 °C) (Oral)   Resp 17   Ht 5' 6\" (1.676 m)   Wt 228 lb (103.4 kg)   LMP 05/25/2019   SpO2 98%   BMI 36.80 kg/m²     Physical Exam   Constitutional: She appears well-developed and well-nourished. No distress. Cardiovascular: Normal rate, regular rhythm and normal heart sounds. Exam reveals no gallop and no friction rub. No murmur heard. Pulmonary/Chest: Effort normal and breath sounds normal. No stridor. No respiratory distress. She has no wheezes. She has no rales. Skin: Skin is warm and dry. She is not diaphoretic. Psychiatric: She has a normal mood and affect. Her behavior is normal.   Nursing note and vitals reviewed. No results found for this or any previous visit (from the past 12 hour(s)).        Assessment / Plan     Scoliosis/ Back Pain/ Obesity  - Gave referral to see Dr. Andres Kee at [address] for 2nd opinon  - Discussed this would be a rather large surgery requiring long periods of rehab and she would not be able to provide for herself for sometime  - Also discussed her BMI put her at increased risk for surgical complications including wound dehisce, infection, DVT/ PE, and poor rehab prognosis - would recommend any possible weight loss before partaking in surgery  - Gave referral for [facility] and encourage considering dry needling for pain    Fibromyalgia  - Discussed available treatment options including SSRIs/ SNRIs, Cymbalta, counseling, treating underlying depression/ anxiety is key  - Also recommend healthy diet, regular exercise that is not particularly straining to the joints including aerobics, selvin chi, yoga  - Patient would like to follow up with Psych before making any medication changes    Follow up PRN    I have discussed the diagnosis with the patient and the intended plan as seen in the above orders. The patient has received an after-visit summary and questions were answered concerning future plans. I have discussed medication side effects and warnings with the patient as well.     Patient discussed with Dr. Queta Gonzalez (Attending)    Tim Harrell DO  Family Medicine Resident

## 2019-06-14 NOTE — PROGRESS NOTES
Chief Complaint   Patient presents with    Other      patient wants to discuss recommedations for back surgery from Ortho doctor     Fibromyalgia      patient wants to discuss fibromyalgia with doctor     Blood pressure 96/67, pulse 69, temperature 98.3 °F (36.8 °C), temperature source Oral, resp. rate 17, height 5' 6\" (1.676 m), weight 228 lb (103.4 kg), last menstrual period 05/25/2019, SpO2 98 %. 1. Have you been to the ER, urgent care clinic since your last visit? Hospitalized since your last visit? No    2. Have you seen or consulted any other health care providers outside of the [address] [place] [address] since your last visit? Include any pap smears or colon screening.  No

## 2019-07-16 ENCOUNTER — TELEPHONE (OUTPATIENT)
Dept: FAMILY MEDICINE CLINIC | Age: 39
End: 2019-07-16

## 2019-07-16 NOTE — TELEPHONE ENCOUNTER
----- Message from Matthew Mgaallanes sent at 7/16/2019 10:38 AM EDT -----  Regarding: Dr. Francis Aragon   Pt is requesting the Rx sent was recently written for her needles to be sent via fax to [phone] ([facility]). Best contact number for the Pt is [phone].

## 2019-07-16 NOTE — TELEPHONE ENCOUNTER
[facility]  Send order for dry needle to be sent via fax to [phone]

## 2019-07-17 DIAGNOSIS — M41.9 SCOLIOSIS, UNSPECIFIED SCOLIOSIS TYPE, UNSPECIFIED SPINAL REGION: Primary | ICD-10-CM

## 2019-07-17 DIAGNOSIS — M54.9 BACK PAIN, UNSPECIFIED BACK LOCATION, UNSPECIFIED BACK PAIN LATERALITY, UNSPECIFIED CHRONICITY: ICD-10-CM

## 2019-07-30 ENCOUNTER — TELEPHONE (OUTPATIENT)
Dept: NEUROLOGY | Age: 39
End: 2019-07-30

## 2019-07-30 NOTE — TELEPHONE ENCOUNTER
Called and spoke with patient. Pt c/o memory loss. \"I cannot remember anything. My fiance has to remind me to pay the rent. He leaves notes all over the house to remind me of things\"  Was last seen 4/2019 for migraines---patient states that is going well, except when she tries really hard to remember==it causes a headache. I advised patient Dr. Tung Leos is out of the office this afternoon and that I would get back to her tomorrow.

## 2019-07-31 NOTE — TELEPHONE ENCOUNTER
Called and notified patient of MDs recommendations. She was already scheduled for a f/u 8/19/19--reminded her of appt date/time.

## 2019-08-19 ENCOUNTER — OFFICE VISIT (OUTPATIENT)
Dept: NEUROLOGY | Age: 39
End: 2019-08-19

## 2019-08-19 VITALS
WEIGHT: 228 LBS | SYSTOLIC BLOOD PRESSURE: 102 MMHG | OXYGEN SATURATION: 98 % | HEIGHT: 66 IN | DIASTOLIC BLOOD PRESSURE: 60 MMHG | HEART RATE: 81 BPM | RESPIRATION RATE: 16 BRPM | BODY MASS INDEX: 36.64 KG/M2 | TEMPERATURE: 98.7 F

## 2019-08-19 DIAGNOSIS — M79.7 FIBROMYALGIA: ICD-10-CM

## 2019-08-19 DIAGNOSIS — G43.111 INTRACTABLE MIGRAINE WITH AURA WITH STATUS MIGRAINOSUS: Primary | ICD-10-CM

## 2019-08-19 RX ORDER — KETOCONAZOLE 20 MG/G
CREAM TOPICAL
Refills: 1 | COMMUNITY
Start: 2019-06-25 | End: 2020-03-19

## 2019-08-19 RX ORDER — DULOXETIN HYDROCHLORIDE 30 MG/1
30 CAPSULE, DELAYED RELEASE ORAL DAILY
Qty: 30 CAP | Refills: 5 | Status: SHIPPED | OUTPATIENT
Start: 2019-08-19 | End: 2019-10-02 | Stop reason: SDUPTHER

## 2019-08-19 NOTE — PROGRESS NOTES
Chief Complaint   Patient presents with    Follow-up     F/u from 4/2019 visit. She is taking the Trokendi and still c/o random h/as.      Visit Vitals  /60 (BP 1 Location: Right arm, BP Patient Position: Sitting)   Pulse 81   Temp 98.7 °F (37.1 °C) (Oral)   Resp 16   Ht 5' 6\" (1.676 m)   Wt 103.4 kg (228 lb)   SpO2 98%   BMI 36.80 kg/m²

## 2019-08-19 NOTE — LETTER
8/19/19 Patient: Erin Juarez YOB: 1980 Date of Visit: 8/19/2019 Anastasia Carvalho, [address] [address] VIA In Basket Dear Anastasia Carvalho DO, Thank you for referring Ms. Dalila Kang to [facility] for evaluation. My notes for this consultation are attached. If you have questions, please do not hesitate to call me. I look forward to following your patient along with you. Sincerely, Kierra Bowman MD

## 2019-08-19 NOTE — PROGRESS NOTES
Neurology Progress Note    Patient ID:  Dante Posey  [id number]  52 y.o.  1980      Subjective:   History:  Diaz Page a  female who  has a past medical history of Arthritis; Depression; Headache; and Seizures ([facility]). who in Sept 2018, noted blurred vision of the R eye, described as there is a film in the eyes and severe headache R frontal and R temple area, R periorbital, shooting to back of head, occurring every day, throbbing, constant, 8/10, no aggravating/relivieng factor with nausea and photophobia. Considerations include migraine, with visual auras, intractable, hemicrania continua and pseudotumor cerebri. Patient saw Dr Saul Carrizales (ophtalmologist) who said everything was fine. Patient was placed on Propranolol and Wellbutrin with no clear benefit. Tried Amitriptyline which helped in the past.  Patient was seen at ER with MRI brain showing non-specific white matter changes. Spinal tap was unremarkable.     Patient recently diagnosed with fibromyalgia c/o rheumatologist Dr Jennifer Ocampo, but does not want to start her on medication. Has ran out of her Wllbutrin, so not taking it currently. (+) stress with needing to move out.       ROS:  Per HPI-  Otherwise the remainder of ROS was negative    Social Hx  Social History     Socioeconomic History    Marital status: SINGLE     Spouse name: Not on file    Number of children: Not on file    Years of education: Not on file    Highest education level: Not on file   Tobacco Use    Smoking status: Never Smoker    Smokeless tobacco: Never Used   Substance and Sexual Activity    Alcohol use: No    Drug use: No    Sexual activity: Not Currently       Meds:  Current Outpatient Medications on File Prior to Visit   Medication Sig Dispense Refill    TROKENDI XR 50 mg capsule TAKE 1 CAPSULE BY MOUTH EVERY DAY AT NIGHT 30 Cap 5    diclofenac potassium (CATAFLAM) 50 mg tablet Take one tablet by mouth  at onset of headache, may take another 1 tab in 4 hrs if needed with meals. 30 Tab 1    sodium chloride (NORMAL SALINE FLUSH) 5-10 mL by IntraVENous route daily. before and after IV abx administration      ondansetron (ZOFRAN ODT) 4 mg disintegrating tablet Take 1 Tab by mouth every eight (8) hours as needed for Nausea. 12 Tab 0    lidocaine-tetracaine 7-7 % crea       albuterol (PROVENTIL VENTOLIN) 2.5 mg /3 mL (0.083 %) nebulizer solution by Nebulization route every four (4) hours as needed.  QVAR REDIHALER 40 mcg/actuation HFAb inhaler Take 40 mcg by inhalation two (2) times a day. 1    ketoconazole (NIZORAL) 2 % topical cream APPLY 1 APPLICATION 2 TIMES A DAY FOR 6 WEEKS  1    heparin, porcine, pf (HEPARIN LOCKFLUSH,PORCINE,,PF,) 10 unit/mL injection 10 Units by IntraVENous route daily. administer after ABX, NS      indomethacin (INDOCIN) 50 mg capsule Take 50 mg by mouth three (3) times daily as needed (HEADACHE).  triamcinolone acetonide (KENALOG) 0.147 mg/gram aero topical spray       clobetasol (TEMOVATE) 0.05 % ointment       nystatin (MYCOSTATIN) 100,000 unit/gram ointment Apply  to affected area two (2) times a day. 15 g 0    nystatin (MYCOSTATIN) powder Apply  to affected area four (4) times daily. 1 Bottle 1    propranolol (INDERAL) 10 mg tablet Take 10 mg by mouth nightly as needed. 4    hydrocortisone (HYTONE) 2.5 % topical cream Apply  to affected area two (2) times a day. 30 g 0     No current facility-administered medications on file prior to visit. Imaging:    CT Results (recent):  Results from Hospital Encounter encounter on 02/28/19   CT HEAD WO CONT    Narrative EXAM: CT HEAD WO CONT    INDICATION: right facial tingling    COMPARISON: None. CONTRAST: None. TECHNIQUE: Unenhanced CT of the head was performed using 5 mm images. Brain and  bone windows were generated.   CT dose reduction was achieved through use of a  standardized protocol tailored for this examination and automatic exposure  control for dose modulation. FINDINGS:  The ventricles and sulci are normal in size, shape and configuration and  midline. There is no significant white matter disease. There is no intracranial  hemorrhage, extra-axial collection, mass, mass effect or midline shift. The  basilar cisterns are open. No acute infarct is identified. The bone windows  demonstrate no abnormalities. The visualized portions of the paranasal sinuses  and mastoid air cells are clear. Impression IMPRESSION: No acute intracranial process identified       MRI Results (recent):  Results from Hospital Encounter encounter on 09/14/18   MRI BRAIN W WO CONT    Narrative EXAM: MRI BRAIN     INDICATION:   Include scan of orbits bilaterally. Rule out MS and Optic  Neuritis per Ophthalmology   Vision loss     SEQUENCES:   Sagittal T1, axial T1, T2, GRE and FLAIR; axial and coronal T1 post  enhancement; and diffusion imaging of the brain. 19 mL of Dotarem. Thin section  imaging is also performed through the orbits with and without contrast.    FINDINGS:     Orbital soft tissues, optic nerves and chiasm are normal in size, morphology,  signal intensity and enhancement. There are a few punctate white matter foci of T2 hyperintensity in the left  parietal lobe, nonspecific. Remainder of the brain including corpus callosum and  pericallosal white matter show normal signal intensity. The enhancement is  normal.      There is no evidence for mass, hemorrhage, shift, or hydrocephalus. There is no  extra-axial fluid collection. Diffusion imaging shows no diffusion restriction  to indicate acute infarct/ischemia or other process. Impression IMPRESSION: Few nonspecific left parietal white matter T2 hyperintensities. Normal appearance of the orbits. Normal diffusion and enhancement. IR Results (recent):  No results found for this or any previous visit.     VAS/US Results (recent):  Results from Hospital Encounter encounter on 10/31/18   DUPLEX LOWER EXT VENOUS RIGHT       Reviewed records in [facility] tab today    Lab Review     No visits with results within 3 Month(s) from this visit. Latest known visit with results is:   Office Visit on 05/03/2019   Component Date Value Ref Range Status    VALID INTERNAL CONTROL POC 05/03/2019 Yes   Final    HCG urine, Ql. (POC) 05/03/2019 Negative  Negative Final    Lyme Ab, IgG/IgM 05/03/2019 <0.91  0.00 - 0.90 ISR Final    Comment:                                 Negative         <0.91                                  Equivocal  0.91 - 1.09                                  Positive         >1.09           Objective:       Exam:  Visit Vitals  /60 (BP 1 Location: Right arm, BP Patient Position: Sitting)   Pulse 81   Temp 98.7 °F (37.1 °C) (Oral)   Resp 16   Ht 5' 6\" (1.676 m)   Wt 103.4 kg (228 lb)   SpO2 98%   BMI 36.80 kg/m²     Gen: Awake, alert, follows commands  Appropriate appearance, normal speech. Oriented to all spheres. No visual field defect on confrontation exam.  Full eyes movement, with no nystagmus, no diplopia, no ptosis. Normal gag and swallow. All remaining cranial nerves were normal  Motor function: 5/5 in all extremities  Sensory: intact to LT, PP and JPS  Good FTN and HTS   Gait: Normal    Assessment:       ICD-10-CM ICD-9-CM    1. Intractable migraine with aura with status migrainosus G43. 111 346.03    2. Fibromyalgia M79.7 729.1           Plan:   1. Will start on Cymbalta 30 mg every day for fibromyalgia/chronic pain, headache and anxiety/depression  2.  Continue Trokendi XR 50 mg QHS  3. Continue Cambia prn to abort headache  4. Continue headache diary and list that can trigger headache                    Carloz Swartz MD  Diplomate, American Board of Psychiatry and Neurology  Diplomate, Neuromuscular Medicine  Diplomate, American Board of Electrodiagnostic Medicine

## 2019-10-01 ENCOUNTER — TELEPHONE (OUTPATIENT)
Dept: NEUROLOGY | Age: 39
End: 2019-10-01

## 2019-10-01 DIAGNOSIS — G43.111 INTRACTABLE MIGRAINE WITH AURA WITH STATUS MIGRAINOSUS: ICD-10-CM

## 2019-10-01 DIAGNOSIS — M79.7 FIBROMYALGIA: Primary | ICD-10-CM

## 2019-10-01 NOTE — TELEPHONE ENCOUNTER
----- Message from Kelly Camacho sent at 10/1/2019 12:22 PM EDT -----  Regarding: Dr Tony Waller refill  Medication Refill    Caller (if not patient):      Relationship of caller (if not patient):      Best contact number(s):   [phone]    Name of medication and dosage if known: Cymbalta       Is patient out of this medication (yes/no):  Yes, she lost her rx    Pharmacy name:[facility]    Pharmacy listed in chart? (yes/no):yes  Pharmacy phone number:  [phone]    Details to clarify the request:pt said she misplaced her original rx that she received during her last office visit in August and hopes another one can be sent to her [address]

## 2019-10-02 RX ORDER — DULOXETIN HYDROCHLORIDE 30 MG/1
30 CAPSULE, DELAYED RELEASE ORAL DAILY
Qty: 30 CAP | Refills: 5 | Status: SHIPPED | OUTPATIENT
Start: 2019-10-02 | End: 2019-10-28 | Stop reason: SDUPTHER

## 2019-10-02 NOTE — TELEPHONE ENCOUNTER
Per Dr. Sriram Kern:    Claribel Ruby MD  You 13 minutes ago (3:17 PM)      OK to write Cymbalta prescription. Pls send to Dr Elder Mccullough.     Routing comment

## 2019-10-15 ENCOUNTER — OFFICE VISIT (OUTPATIENT)
Dept: FAMILY MEDICINE CLINIC | Age: 39
End: 2019-10-15

## 2019-10-15 ENCOUNTER — APPOINTMENT (OUTPATIENT)
Dept: ULTRASOUND IMAGING | Age: 39
End: 2019-10-15
Attending: EMERGENCY MEDICINE
Payer: MEDICAID

## 2019-10-15 ENCOUNTER — HOSPITAL ENCOUNTER (EMERGENCY)
Age: 39
Discharge: HOME | End: 2019-10-16
Attending: EMERGENCY MEDICINE
Payer: MEDICAID

## 2019-10-15 ENCOUNTER — APPOINTMENT (OUTPATIENT)
Dept: CT IMAGING | Age: 39
End: 2019-10-15
Attending: PHYSICIAN ASSISTANT
Payer: MEDICAID

## 2019-10-15 VITALS
HEIGHT: 66 IN | TEMPERATURE: 98.6 F | DIASTOLIC BLOOD PRESSURE: 75 MMHG | BODY MASS INDEX: 36.32 KG/M2 | HEART RATE: 63 BPM | SYSTOLIC BLOOD PRESSURE: 118 MMHG | RESPIRATION RATE: 15 BRPM | WEIGHT: 226 LBS | OXYGEN SATURATION: 98 %

## 2019-10-15 VITALS
BODY MASS INDEX: 36.32 KG/M2 | TEMPERATURE: 98.1 F | WEIGHT: 226 LBS | OXYGEN SATURATION: 99 % | HEIGHT: 66 IN | HEART RATE: 76 BPM | SYSTOLIC BLOOD PRESSURE: 130 MMHG | DIASTOLIC BLOOD PRESSURE: 90 MMHG | RESPIRATION RATE: 16 BRPM

## 2019-10-15 DIAGNOSIS — N83.209 HEMORRHAGIC OVARIAN CYST: Primary | ICD-10-CM

## 2019-10-15 DIAGNOSIS — R10.811 RIGHT UPPER QUADRANT ABDOMINAL TENDERNESS WITHOUT REBOUND TENDERNESS: Primary | ICD-10-CM

## 2019-10-15 LAB
ALBUMIN SERPL-MCNC: 3.5 G/DL (ref 3.5–5)
ALBUMIN/GLOB SERPL: 0.9 {RATIO} (ref 1.1–2.2)
ALP SERPL-CCNC: 64 U/L (ref 45–117)
ALT SERPL-CCNC: 38 U/L (ref 12–78)
ANION GAP SERPL CALC-SCNC: 7 MMOL/L (ref 5–15)
APPEARANCE UR: CLEAR
AST SERPL-CCNC: 24 U/L (ref 15–37)
BACTERIA URNS QL MICRO: NEGATIVE /HPF
BASOPHILS # BLD: 0 K/UL (ref 0–0.1)
BASOPHILS NFR BLD: 1 % (ref 0–1)
BILIRUB SERPL-MCNC: 0.2 MG/DL (ref 0.2–1)
BILIRUB UR QL: NEGATIVE
BUN SERPL-MCNC: 16 MG/DL (ref 6–20)
BUN/CREAT SERPL: 18 (ref 12–20)
CALCIUM SERPL-MCNC: 8.7 MG/DL (ref 8.5–10.1)
CHLORIDE SERPL-SCNC: 111 MMOL/L (ref 97–108)
CO2 SERPL-SCNC: 23 MMOL/L (ref 21–32)
COLOR UR: ABNORMAL
COMMENT, HOLDF: NORMAL
CREAT SERPL-MCNC: 0.88 MG/DL (ref 0.55–1.02)
DIFFERENTIAL METHOD BLD: NORMAL
EOSINOPHIL # BLD: 0.1 K/UL (ref 0–0.4)
EOSINOPHIL NFR BLD: 2 % (ref 0–7)
EPITH CASTS URNS QL MICRO: ABNORMAL /LPF
ERYTHROCYTE [DISTWIDTH] IN BLOOD BY AUTOMATED COUNT: 14 % (ref 11.5–14.5)
GLOBULIN SER CALC-MCNC: 4.1 G/DL (ref 2–4)
GLUCOSE SERPL-MCNC: 90 MG/DL (ref 65–100)
GLUCOSE UR STRIP.AUTO-MCNC: NEGATIVE MG/DL
HCG SERPL QL: NEGATIVE
HCT VFR BLD AUTO: 39.7 % (ref 35–47)
HGB BLD-MCNC: 12.5 G/DL (ref 11.5–16)
HGB UR QL STRIP: NEGATIVE
IMM GRANULOCYTES # BLD AUTO: 0 K/UL (ref 0–0.04)
IMM GRANULOCYTES NFR BLD AUTO: 0 % (ref 0–0.5)
KETONES UR QL STRIP.AUTO: NEGATIVE MG/DL
LEUKOCYTE ESTERASE UR QL STRIP.AUTO: NEGATIVE
LIPASE SERPL-CCNC: 267 U/L (ref 73–393)
LYMPHOCYTES # BLD: 2.8 K/UL (ref 0.8–3.5)
LYMPHOCYTES NFR BLD: 40 % (ref 12–49)
MCH RBC QN AUTO: 28.5 PG (ref 26–34)
MCHC RBC AUTO-ENTMCNC: 31.5 G/DL (ref 30–36.5)
MCV RBC AUTO: 90.6 FL (ref 80–99)
MONOCYTES # BLD: 0.5 K/UL (ref 0–1)
MONOCYTES NFR BLD: 7 % (ref 5–13)
MUCOUS THREADS URNS QL MICRO: ABNORMAL /LPF
NEUTS SEG # BLD: 3.5 K/UL (ref 1.8–8)
NEUTS SEG NFR BLD: 50 % (ref 32–75)
NITRITE UR QL STRIP.AUTO: NEGATIVE
NRBC # BLD: 0 K/UL (ref 0–0.01)
NRBC BLD-RTO: 0 PER 100 WBC
PH UR STRIP: 5.5 [PH] (ref 5–8)
PLATELET # BLD AUTO: 273 K/UL (ref 150–400)
PMV BLD AUTO: 9.8 FL (ref 8.9–12.9)
POTASSIUM SERPL-SCNC: 3.7 MMOL/L (ref 3.5–5.1)
PROT SERPL-MCNC: 7.6 G/DL (ref 6.4–8.2)
PROT UR STRIP-MCNC: NEGATIVE MG/DL
RBC # BLD AUTO: 4.38 M/UL (ref 3.8–5.2)
RBC #/AREA URNS HPF: ABNORMAL /HPF (ref 0–5)
SAMPLES BEING HELD,HOLD: NORMAL
SODIUM SERPL-SCNC: 141 MMOL/L (ref 136–145)
SP GR UR REFRACTOMETRY: 1.03 (ref 1–1.03)
UROBILINOGEN UR QL STRIP.AUTO: 1 EU/DL (ref 0.2–1)
WBC # BLD AUTO: 7 K/UL (ref 3.6–11)
WBC URNS QL MICRO: ABNORMAL /HPF (ref 0–4)

## 2019-10-15 PROCEDURE — 80053 COMPREHEN METABOLIC PANEL: CPT

## 2019-10-15 PROCEDURE — 96374 THER/PROPH/DIAG INJ IV PUSH: CPT

## 2019-10-15 PROCEDURE — 96375 TX/PRO/DX INJ NEW DRUG ADDON: CPT

## 2019-10-15 PROCEDURE — 36415 COLL VENOUS BLD VENIPUNCTURE: CPT

## 2019-10-15 PROCEDURE — 76830 TRANSVAGINAL US NON-OB: CPT

## 2019-10-15 PROCEDURE — 96361 HYDRATE IV INFUSION ADD-ON: CPT

## 2019-10-15 PROCEDURE — 74177 CT ABD & PELVIS W/CONTRAST: CPT

## 2019-10-15 PROCEDURE — 74011636320 HC RX REV CODE- 636/320: Performed by: RADIOLOGY

## 2019-10-15 PROCEDURE — 74011250636 HC RX REV CODE- 250/636: Performed by: PHYSICIAN ASSISTANT

## 2019-10-15 PROCEDURE — 85025 COMPLETE CBC W/AUTO DIFF WBC: CPT

## 2019-10-15 PROCEDURE — 83690 ASSAY OF LIPASE: CPT

## 2019-10-15 PROCEDURE — 99283 EMERGENCY DEPT VISIT LOW MDM: CPT

## 2019-10-15 PROCEDURE — 76856 US EXAM PELVIC COMPLETE: CPT

## 2019-10-15 PROCEDURE — 74011250636 HC RX REV CODE- 250/636: Performed by: EMERGENCY MEDICINE

## 2019-10-15 PROCEDURE — 81001 URINALYSIS AUTO W/SCOPE: CPT

## 2019-10-15 PROCEDURE — 84703 CHORIONIC GONADOTROPIN ASSAY: CPT

## 2019-10-15 RX ORDER — HYDROCODONE BITARTRATE AND ACETAMINOPHEN 5; 325 MG/1; MG/1
1-2 TABLET ORAL
Qty: 15 TAB | Refills: 0 | Status: SHIPPED | OUTPATIENT
Start: 2019-10-15 | End: 2019-10-18

## 2019-10-15 RX ORDER — ONDANSETRON 4 MG/1
4 TABLET, ORALLY DISINTEGRATING ORAL
Qty: 12 TAB | Refills: 0 | Status: SHIPPED | OUTPATIENT
Start: 2019-10-15 | End: 2020-03-19

## 2019-10-15 RX ORDER — HYDROMORPHONE HYDROCHLORIDE 1 MG/ML
1 INJECTION, SOLUTION INTRAMUSCULAR; INTRAVENOUS; SUBCUTANEOUS
Status: COMPLETED | OUTPATIENT
Start: 2019-10-15 | End: 2019-10-15

## 2019-10-15 RX ORDER — ONDANSETRON 2 MG/ML
4 INJECTION INTRAMUSCULAR; INTRAVENOUS
Status: COMPLETED | OUTPATIENT
Start: 2019-10-15 | End: 2019-10-15

## 2019-10-15 RX ADMIN — ONDANSETRON 4 MG: 2 INJECTION INTRAMUSCULAR; INTRAVENOUS at 20:27

## 2019-10-15 RX ADMIN — HYDROMORPHONE HYDROCHLORIDE 1 MG: 1 INJECTION, SOLUTION INTRAMUSCULAR; INTRAVENOUS; SUBCUTANEOUS at 23:38

## 2019-10-15 RX ADMIN — SODIUM CHLORIDE 1000 ML: 900 INJECTION, SOLUTION INTRAVENOUS at 20:27

## 2019-10-15 RX ADMIN — IOPAMIDOL 100 ML: 755 INJECTION, SOLUTION INTRAVENOUS at 21:37

## 2019-10-15 NOTE — LETTER
Migue Abdi 
[facility] EMERGENCY DEPT 
[address] [place] [id number] [phone] Work/School Note Date: 10/15/2019 To Whom It May concern: 
 
Caden Mitchell was seen and treated today in the emergency room by the following provider(s): 
Attending Provider: Bety Chery MD. Caden Mitchell may return to work on Monday, Oct 21, 2019.  
 
Sincerely, 
 
 
 
 
Jose Angel Gunderson MD

## 2019-10-15 NOTE — ED PROVIDER NOTES
I have evaluated the patient as the Provider in Triage. I have reviewed Her vital signs and the triage nurse assessment. I have talked with the patient and any available family and advised that I am the provider in triage and have ordered the appropriate study to initiate their work up based on the clinical presentation during my assessment. I have advised that the patient will be accommodated in the Main ED as soon as possible. I have also requested to contact the triage nurse or myself immediately if the patient experiences any changes in their condition during this brief waiting period. Pt complains of right sided abdominal pain with associated nausea, vomiting, and diarrhea for the past week. Pt notes onset of dizziness today while at her PCP office. Pt denies blood in the stool. Note written by Ann Ramirez, as dictated by JOHN Pompa 6:43 PM  ______________    45 y.o. female with past medical history significant for depression, seizures, arthritis, headache, and anxiety who presents from private vehicle with chief complaint of abdominal pain. Pt reports right sided abdominal pain, accompanied by N/V/D for 1 week. Pt notes accompanying headache and urinary frequency. Pt states she is followed by neurology for headache. Pt reports a tick bite 1 year ago and notes hx of lyme disease. Pt reports previous surgical history of  section, hernia repair, tubal ligation, and cholecystectomy in the past. Pt denies black/bloody stools, dysuria, or vaginal discharge/bleeding. There are no other acute medical concerns at this time. PCP: Payal Chogn DO    Note written by Ann Dorsey, as dictated by Betty Baxter MD 7:28 PM          The history is provided by the patient. No  was used.         Past Medical History:   Diagnosis Date    Anxiety     Arthritis     Depression     Headache     Ill-defined condition     scoliosis, gall stones, anemia    Seizures ([facility] [address]        Past Surgical History:   Procedure Laterality Date    HX  SECTION      x 4    HX CHOLECYSTECTOMY  2017    HX HERNIA REPAIR      HX TUBAL LIGATION           Family History:   Problem Relation Age of Onset    Arthritis-rheumatoid Mother     Depression Mother     Depression Father     Diabetes Father     Cancer Maternal Aunt     Cancer Maternal Uncle     Cancer Paternal Aunt     Cancer Paternal Uncle     Heart Attack Maternal Grandmother     Cancer Maternal Grandfather        Social History     Socioeconomic History    Marital status: SINGLE     Spouse name: Not on file    Number of children: Not on file    Years of education: Not on file    Highest education level: Not on file   Occupational History    Not on file   Social Needs    Financial resource strain: Not on file    Food insecurity:     Worry: Not on file     Inability: Not on file    Transportation needs:     Medical: Not on file     Non-medical: Not on file   Tobacco Use    Smoking status: Never Smoker    Smokeless tobacco: Never Used   Substance and Sexual Activity    Alcohol use: No    Drug use: No    Sexual activity: Not Currently   Lifestyle    Physical activity:     Days per week: Not on file     Minutes per session: Not on file    Stress: Not on file   Relationships    Social connections:     Talks on phone: Not on file     Gets together: Not on file     Attends Mosque service: Not on file     Active member of club or organization: Not on file     Attends meetings of clubs or organizations: Not on file     Relationship status: Not on file    Intimate partner violence:     Fear of current or ex partner: Not on file     Emotionally abused: Not on file     Physically abused: Not on file     Forced sexual activity: Not on file   Other Topics Concern    Not on file   Social History Narrative    Not on file         ALLERGIES: Other food; Tramadol; Nut - unspecified; and [address]    Review of Systems   Gastrointestinal: Positive for abdominal pain, diarrhea, nausea and vomiting. Negative for blood in stool. Genitourinary: Positive for frequency. Negative for dysuria, vaginal bleeding and vaginal discharge. Neurological: Positive for headaches. All other systems reviewed and are negative. Vitals:    10/15/19 1844   BP: 118/75   Pulse: 63   Resp: 15   Temp: 98.6 °F (37 °C)   SpO2: 98%   Weight: 102.5 kg (226 lb)   Height: 5' 6\" (1.676 m)            Physical Exam   Vital signs reviewed. Nursing notes reviewed. Const:  No acute distress, well developed, well nourished  Head:  Atraumatic, normocephalic  Eyes:  PERRL, conjunctiva normal, no scleral icterus  Neck:  Supple, trachea midline  Cardiovascular:  RRR, no murmurs, no gallops, no rubs  Resp:  No resp distress, no increased work of breathing, no wheezes, no rhonchi, no rales,  Abd:  Soft, mild RUQ tenderness, non-distended, no rebound, no guarding, no CVA tenderness  MSK:  No pedal edema, normal ROM  Neuro:  Alert and oriented x3, no cranial nerve defect  Skin:  Warm, dry, intact  Psych: normal mood and affect, behavior is normal, judgement and thought content is normal  Note written by Ann Pete, as dictated by Beckie Kiran MD 7:28 PM      MDM  Number of Diagnoses or Management Options  Hemorrhagic ovarian cyst:      Amount and/or Complexity of Data Reviewed  Clinical lab tests: ordered and reviewed  Tests in the radiology section of CPT®: ordered and reviewed  Review and summarize past medical records: yes    Patient Progress  Patient progress: stable          Ms. Jeimy De Santiago is a 27-year-old female who presents to the ER with right-sided abdominal pain. No clear cause of her symptoms on CT. She does have an ovarian cyst.  This could be the cause of her symptoms. We will order an ultrasound to further evaluate. Patient signed out to Dr. Georgie Callahan, who will assume care of the patient.       Procedures

## 2019-10-15 NOTE — PROGRESS NOTES
Chief Complaint   Patient presents with    Vomiting     started saturday--    Diarrhea   :    HPI  Jun Chua is a 45 y.o. female who presents for a porgressive onset of diarrhea, 4 days ago  . the context:no recent travel, but travels through hospitals for work. Diarrhea: 5 BM watery on Saturday, 2 today, one loose and one was not, no blood, stool are now formed. Associated with N/V x2, NBNB. No vomiting today, lightheadedness, some abdominal pain only while she was using the bathroom. Did not recall eating anything out of ordinary  Had removed a thick from her thigh 6 days ago, was not engorged. Patient has tried Zofran, 32 oz cup x 2 today. Patient denies fever,  No abdominal pain. Allergies - reviewed: Allergies   Allergen Reactions    Other Food Swelling     All nuts except peanuts    Tramadol Nausea and Vomiting    Nut - Unspecified Swelling    [place] Swelling     Swollen lips       Meds - reviewed:   Current Outpatient Medications   Medication Sig Dispense Refill    DULoxetine (CYMBALTA) 30 mg capsule Take 1 Cap by mouth daily. 30 Cap 5    diclofenac potassium (CATAFLAM) 50 mg tablet Take one tablet by mouth  at onset of headache, may take another 1 tab in 4 hrs if needed with meals. 30 Tab 1    albuterol (PROVENTIL VENTOLIN) 2.5 mg /3 mL (0.083 %) nebulizer solution by Nebulization route every four (4) hours as needed.  ketoconazole (NIZORAL) 2 % topical cream APPLY 1 APPLICATION 2 TIMES A DAY FOR 6 WEEKS  1    TROKENDI XR 50 mg capsule TAKE 1 CAPSULE BY MOUTH EVERY DAY AT NIGHT 30 Cap 5    sodium chloride (NORMAL SALINE FLUSH) 5-10 mL by IntraVENous route daily. before and after IV abx administration      heparin, porcine, pf (HEPARIN LOCKFLUSH,PORCINE,,PF,) 10 unit/mL injection 10 Units by IntraVENous route daily. administer after ABX, NS      ondansetron (ZOFRAN ODT) 4 mg disintegrating tablet Take 1 Tab by mouth every eight (8) hours as needed for Nausea.  12 Tab 0  indomethacin (INDOCIN) 50 mg capsule Take 50 mg by mouth three (3) times daily as needed (HEADACHE).  triamcinolone acetonide (KENALOG) 0.147 mg/gram aero topical spray       clobetasol (TEMOVATE) 0.05 % ointment       lidocaine-tetracaine 7-7 % crea       nystatin (MYCOSTATIN) 100,000 unit/gram ointment Apply  to affected area two (2) times a day. 15 g 0    nystatin (MYCOSTATIN) powder Apply  to affected area four (4) times daily. 1 Bottle 1    propranolol (INDERAL) 10 mg tablet Take 10 mg by mouth nightly as needed. 4    QVAR REDIHALER 40 mcg/actuation HFAb inhaler Take 40 mcg by inhalation two (2) times a day. 1    hydrocortisone (HYTONE) 2.5 % topical cream Apply  to affected area two (2) times a day.  30 g 0       PMH- reviewed:  Past Medical History:   Diagnosis Date    Anxiety     Arthritis     Depression     Headache     Ill-defined condition     scoliosis, gall stones, anemia    Seizures (HCC)        SH- reviewed:  Smoker:  Social History     Tobacco Use   Smoking Status Never Smoker   Smokeless Tobacco Never Used       ETOH- reviewed:   Social History     Substance and Sexual Activity   Alcohol Use No       FH- reviewed:   Family History   Problem Relation Age of Onset    Arthritis-rheumatoid Mother     Depression Mother     Depression Father     Diabetes Father     Cancer Maternal Aunt     Cancer Maternal Uncle     Cancer Paternal Aunt     Cancer Paternal Uncle     Heart Attack Maternal Grandmother     Cancer Maternal Grandfather          ROS: Positive for Items in bold:   Constitutional: headache, fever, fatigue, weight loss or weight gain      Eyes: redness, pruritis, pain, visual changes, swelling, or discharge      Ears: ear pain, loss or changes in hearing     Nose: congestion, rhinorrhea  Oropharynx: sore throat, lesions in throat  Cardiac: chest pain        Physical Exam:    Visit Vitals  /90 (BP 1 Location: Right arm, BP Patient Position: Standing)   Pulse 76 Temp 98.1 °F (36.7 °C) (Oral)   Resp 16   Ht 5' 6\" (1.676 m)   Wt 226 lb (102.5 kg)   LMP 09/23/2019   SpO2 99%   BMI 36.48 kg/m²      Vitals:    10/15/19 1536 10/15/19 1642 10/15/19 1643 10/15/19 1646   BP: 103/69 111/75 126/85 130/90   BP 1 Location: Right arm Right arm Right arm Right arm   BP Patient Position: Sitting Supine Sitting Standing   Pulse: 68 62 60 76   Resp: 16      Temp: 98.1 °F (36.7 °C)      TempSrc: Oral      SpO2: 99%      Weight: 226 lb (102.5 kg)      Height: 5' 6\" (1.676 m)          Gen: No apparent distress. But appears sick. Alert and oriented and responds to all questions appropriately. Eyes: Conjunctiva clear, extraocular movements are intact. Ear: External ears are normal. Hearing grossly normal b/l. Nose: Turbinates are within normal limits. +drainage. No sinus tenderness . Oropharynx: No oral lesions or exudates. Neck: Supple; no masses; no thyromegaly appreciated. No cervical LAD  Lungs: Respirations unlabored; clear to auscultation bilaterally  Cardio: Regular, rate, and rhythm without murmurs, gallops or rubs   Abdomen: Soft; RUQ tenderness and LLQ tenderness; nondistended; normoactive bowel sounds; no masses or organomegaly      No results found for this or any previous visit (from the past 12 hour(s)). Assessment and Plan:   Sofy Delatorre is a 45 y.o. female who presents for diarrhea N/V with abdominal tenderness in RUQ and LLQ. She is not tolerating food despite Zofran and feels like everything she will eat will come back out. Recent Thick exposure as well. Patient states she had a cholecystectomy. however Will sent to ED for further evaluation for abdominal pain. I spoke with ED department,, Ramiro Swan at the triage department. They will be expecting the patient. . Patient refused ambulance transportation. ICD-10-CM ICD-9-CM    1.  Right upper quadrant abdominal tenderness without rebound tenderness R10.811 789.61 AMB POC URINE PREGNANCY TEST, VISUAL COLOR COMPARISON       Discussed diagnoses in detail with patient. Patient expressed understanding of and agreement to above plan. All questions and concerns addressed. Medication risks/benefits/side effects discussed with patient. Patient is counseled to return to the office and/or ED if symptoms do not improve as expected. Patient seen and discussed with Dr. Syeda Marquez, Attending Physician. Sandee Damon MD  10/15/19    Family Medicine Resident

## 2019-10-15 NOTE — PROGRESS NOTES
Chief Complaint   Patient presents with    Vomiting    Diarrhea     1. Have you been to the ER, urgent care clinic since your last visit? Hospitalized since your last visit? No    2. Have you seen or consulted any other health care providers outside of the [address] [place] [address] since your last visit? Include any pap smears or colon screening.  No     Travels with job---pharmacy

## 2019-10-16 NOTE — DISCHARGE INSTRUCTIONS
- Please call EITHER Dr. Fabiola White or Dr. Kirby Siemens office in the AM to arrange follow-up. - You may take Norco as needed for pain. Please take a stool softener if taking Norco. Do not drive if taking Hermon.  - Return to ED for fever, increased pain, persistent vomiting, concern for dehydration, any other concerns. Patient Education        Hemorrhagic Ovarian Cyst: Care Instructions  Your Care Instructions    Sometimes a sac forms on the surface of a woman's ovary. When the sac swells up with fluid, it forms a cyst. If the cyst bleeds, it is called a hemorrhagic (say \"ixh-zpx-OJ-jick\") ovarian cyst. If the cyst breaks open, blood and fluid spill out into the lower belly and pelvis. You may not have symptoms from the cyst. But if it is large, or if it twists or breaks open, you may have pain or other problems. You may feel pain from the cyst or have symptoms from losing blood. Your doctor may use a pelvic ultrasound to see if you have a cyst. Blood tests can help your doctor tell if the cyst is bleeding or you have lost a lot of blood. Treatment depends on your symptoms. If they are mild, your doctor may suggest carefully watching your symptoms and doing blood tests again. But if you have a cyst that is very large, bleeds a lot, or causes other problems, your doctor may suggest surgery to remove it. If the bleeding is heavy, you may also need treatment to replace the blood. Follow-up care is a key part of your treatment and safety. Be sure to make and go to all appointments, and call your doctor if you are having problems. It's also a good idea to know your test results and keep a list of the medicines you take. How can you care for yourself at home? · Use heat, such as a warm water bottle, a heating pad set on low, or a warm bath, to relax tense muscles and relieve cramping. · Be safe with medicines. Read and follow all instructions on the label.   ? If the doctor gave you a prescription medicine for pain, take it as prescribed. ? If you are not taking a prescription pain medicine, ask your doctor if you can take an over-the-counter medicine. When should you call for help? Call 911 anytime you think you may need emergency care. For example, call if:    · You passed out (lost consciousness).    Call your doctor now or seek immediate medical care if:    · You have severe vaginal bleeding.     · You are dizzy or lightheaded, or you feel like you may faint.     · You have new or worse pain in your belly or pelvis.    Watch closely for changes in your health, and be sure to contact your doctor if:    · You think you may be pregnant.     · You do not get better as expected. Where can you learn more? Go to http://sarmad-farhana.info/. Enter D256 in the search box to learn more about \"Hemorrhagic Ovarian Cyst: Care Instructions. \"  Current as of: February 19, 2019  Content Version: 12.2  © 6521-2741 [employer]. Care instructions adapted under license by [employer] (which disclaims liability or warranty for this information). If you have questions about a medical condition or this instruction, always ask your healthcare professional. [address] any warranty or liability for your use of this information.

## 2019-10-16 NOTE — ED NOTES
10:30 PM  Change of shift. Care of patient taken over from Dr. Marianna Zhou; H&P reviewed, bedside handoff complete. Awaiting U/S. U/S with hemorrhagic cyst. Pain is now controlled after Dilaudid. Plan discharge with PCP f/u. She may also f/u with ob/gyn, Dr. Nevaeh England. A/P:  1. Ruptured ovarian cyst - Norco prn pain. Zofran prn nausea. Reasons to return to ED reviewed.

## 2019-10-17 NOTE — PROGRESS NOTES
I saw and evaluated the patient, performing the key elements of the service. I discussed the findings, assessment and plan with the resident and agree with the resident's findings and plan as documented in the resident's note. Pt presented with n/v, diarrhea and abdominal pain. Ill appearing AA female. +Right sided abdominal TTP in RUQ and RLQ. Abdomen soft, no guarding present. Agreed with ED eval at the time as pt would benefit from further imaging and IVFs. ED w/up notable for hemorrhagic ovarian cyst suspected to be the cause of her pain.

## 2019-10-28 DIAGNOSIS — G43.111 INTRACTABLE MIGRAINE WITH AURA WITH STATUS MIGRAINOSUS: ICD-10-CM

## 2019-10-28 DIAGNOSIS — M79.7 FIBROMYALGIA: ICD-10-CM

## 2019-10-28 RX ORDER — DULOXETIN HYDROCHLORIDE 30 MG/1
30 CAPSULE, DELAYED RELEASE ORAL DAILY
Qty: 30 CAP | Refills: 5 | Status: SHIPPED | OUTPATIENT
Start: 2019-10-28 | End: 2019-10-30 | Stop reason: SDUPTHER

## 2019-10-28 NOTE — TELEPHONE ENCOUNTER
Received fax from [facility]--patient is requesting a refill of Duloxetine HCL DR 30mg caps #90. She was last seen in the office 8/19/2019.

## 2019-10-30 ENCOUNTER — TELEPHONE (OUTPATIENT)
Dept: NEUROLOGY | Age: 39
End: 2019-10-30

## 2019-10-30 DIAGNOSIS — G43.111 INTRACTABLE MIGRAINE WITH AURA WITH STATUS MIGRAINOSUS: ICD-10-CM

## 2019-10-30 DIAGNOSIS — M79.7 FIBROMYALGIA: ICD-10-CM

## 2019-10-30 DIAGNOSIS — G43.111 INTRACTABLE MIGRAINE WITH AURA WITH STATUS MIGRAINOSUS: Primary | ICD-10-CM

## 2019-10-30 RX ORDER — DULOXETIN HYDROCHLORIDE 30 MG/1
30 CAPSULE, DELAYED RELEASE ORAL DAILY
Qty: 90 CAP | Refills: 1 | Status: SHIPPED | OUTPATIENT
Start: 2019-10-30 | End: 2021-11-10

## 2019-10-30 NOTE — TELEPHONE ENCOUNTER
rec'd fax from [facility] requesting 90 day refill of Cymbalta 30 mg, 30 day supply was sent on 10/28/19 with 5 refills.   Sent new quantity af 90/1 to [facility]

## 2019-10-31 RX ORDER — DICLOFENAC POTASSIUM 50 MG/1
TABLET, FILM COATED ORAL
Qty: 30 TAB | Refills: 1 | Status: SHIPPED | OUTPATIENT
Start: 2019-10-31 | End: 2020-03-19

## 2019-12-09 ENCOUNTER — TELEPHONE (OUTPATIENT)
Dept: NEUROLOGY | Age: 39
End: 2019-12-09

## 2019-12-09 DIAGNOSIS — M79.7 FIBROMYALGIA: Primary | ICD-10-CM

## 2019-12-09 RX ORDER — DULOXETIN HYDROCHLORIDE 60 MG/1
60 CAPSULE, DELAYED RELEASE ORAL DAILY
Qty: 90 CAP | Refills: 1 | Status: SHIPPED | OUTPATIENT
Start: 2019-12-09 | End: 2020-03-08

## 2019-12-09 NOTE — TELEPHONE ENCOUNTER
Per Dr. Lisandro Patiño:    Dimas Wang MD  [facility], April M, LPN   Caller: Unspecified (Today,  2:07 PM)             Increase Cymbalta to 60 mg every day .  Schedule a follow up appointment. I called and advised patient of MDs recommendations. She states she will need a refill and appt made for 1/9/2020.

## 2019-12-09 NOTE — TELEPHONE ENCOUNTER
Called and spoke to patient. States she is in a lot of pain. Hurts to walk, skin hurts. She did PT but it only helps for 2-3 days. It hurts from her neck to her toes. Patient states it even hurts when she is laying down. She also states her headaches are better. Our last note says:    Plan:   1. Will start on Cymbalta 30 mg every day for fibromyalgia/chronic pain, headache and anxiety/depression      And patient states it is not helping. Please advise.

## 2020-01-07 ENCOUNTER — TELEPHONE (OUTPATIENT)
Dept: NEUROLOGY | Age: 40
End: 2020-01-07

## 2020-01-07 NOTE — TELEPHONE ENCOUNTER
Patient requesting a call back from the nurse, patient is having really bad back pains. The medication helps but she is still having a lot of pain.

## 2020-01-09 NOTE — TELEPHONE ENCOUNTER
Patient called on 12/9 c/o pain from her head to her toes and you suggested:  Increase Cymbalta to 60 mg every day .  Schedule a follow up appointment. She is scheduled for an appt 1/13/20. Do you have any advice/suggestions before she is seen?

## 2020-01-10 NOTE — TELEPHONE ENCOUNTER
Akshat Mason MD  You 34 minutes ago (8:43 AM)      Offer if she wants to be referred to pain management        Called patient back and gave her MDs recommendations and advised her to keep her f/u appt for Monday. Patient states she does not want surgery. C/o when she starts hurting, she will lay down and then feels a pressure pain, her legs go numb and her feet swell. Patient states she is losing weight and thought that would have helped.

## 2020-01-13 ENCOUNTER — OFFICE VISIT (OUTPATIENT)
Dept: NEUROLOGY | Age: 40
End: 2020-01-13

## 2020-01-13 VITALS
RESPIRATION RATE: 16 BRPM | DIASTOLIC BLOOD PRESSURE: 78 MMHG | HEIGHT: 66 IN | BODY MASS INDEX: 35.84 KG/M2 | SYSTOLIC BLOOD PRESSURE: 122 MMHG | OXYGEN SATURATION: 98 % | TEMPERATURE: 98.6 F | WEIGHT: 223 LBS | HEART RATE: 68 BPM

## 2020-01-13 DIAGNOSIS — G89.29 CHRONIC LOW BACK PAIN, UNSPECIFIED BACK PAIN LATERALITY, UNSPECIFIED WHETHER SCIATICA PRESENT: Primary | ICD-10-CM

## 2020-01-13 DIAGNOSIS — M79.7 FIBROMYALGIA: ICD-10-CM

## 2020-01-13 DIAGNOSIS — M54.50 CHRONIC LOW BACK PAIN, UNSPECIFIED BACK PAIN LATERALITY, UNSPECIFIED WHETHER SCIATICA PRESENT: Primary | ICD-10-CM

## 2020-01-13 DIAGNOSIS — G43.111 INTRACTABLE MIGRAINE WITH AURA WITH STATUS MIGRAINOSUS: ICD-10-CM

## 2020-01-13 NOTE — PROGRESS NOTES
Neurology Progress Note    Patient ID:  Milka Finney  [id number]  54 y.o.  1980      Subjective:   History:  Linnette Coffman a  female who  has a past medical history of Arthritis; Depression; Headache; and Seizures ([facility]). who in Sept 2018, noted blurred vision of the R eye, described as there is a film in the eyes and severe headache R frontal and R temple area, R periorbital, shooting to back of head, occurring every day, throbbing, constant, 8/10, no aggravating/relivieng factor with nausea and photophobia. Considerations include migraine, with visual auras, intractable, hemicrania continua and pseudotumor cerebri. Patient saw Dr Lucien Cortes (ophtalmologist) who said everything was fine. Patient was placed on Propranolol and Wellbutrin with no clear benefit. Tried Amitriptyline which helped in the past.  Patient was seen at ER with MRI brain showing non-specific white matter changes. Spinal tap was unremarkable. Patient continues to have lower back pain which shoots up all over her body 10/10, wakes patient up at night. Patient was placed on Cymbalta 60 mg every day which helps with anxiety and depression but not the lower back pain. Off Trokendi XR. (+) scoliosis previously seen by Dr Sandra West at [facility]. Headache is better.      Previous tests done:  MRI thoracic spine: Sigmoid curvature of the thoracolumbar spine.  No high-grade spinal           canal stenosis or neuroforaminal narrowing.    MRI lumbar spine: Levoconvex curvature and multilevel degenerative changes of the lumbar           spine including mild right neuroforaminal narrowing at T12-L1 and           L3-L4, moderate right neuroforaminal narrowing at L1-L2, and mild           bilateral neuroforaminal narrowing at L4-L5 as described in detail           above.          ROS:  Per HPI-  Otherwise the remainder of ROS was negative    Social Hx  Social History     Socioeconomic History    Marital status: SINGLE     Spouse name: Not on file    Number of children: Not on file    Years of education: Not on file    Highest education level: Not on file   Tobacco Use    Smoking status: Never Smoker    Smokeless tobacco: Never Used   Substance and Sexual Activity    Alcohol use: No    Drug use: No    Sexual activity: Not Currently       Meds:  Current Outpatient Medications on File Prior to Visit   Medication Sig Dispense Refill    DULoxetine (CYMBALTA) 60 mg capsule Take 1 Cap by mouth daily for 90 days. 90 Cap 1    ondansetron (ZOFRAN ODT) 4 mg disintegrating tablet Take 1 Tab by mouth every eight (8) hours as needed for Nausea. 12 Tab 0    albuterol (PROVENTIL VENTOLIN) 2.5 mg /3 mL (0.083 %) nebulizer solution by Nebulization route every four (4) hours as needed.  propranolol (INDERAL) 10 mg tablet Take 10 mg by mouth nightly as needed. 4    QVAR REDIHALER 40 mcg/actuation HFAb inhaler Take 40 mcg by inhalation two (2) times a day. 1    hydrocortisone (HYTONE) 2.5 % topical cream Apply  to affected area two (2) times a day. 30 g 0    diclofenac potassium (CATAFLAM) 50 mg tablet Take one tablet by mouth  at onset of headache, may take another 1 tab in 4 hrs if needed with meals. 30 Tab 1    DULoxetine (CYMBALTA) 30 mg capsule Take 1 Cap by mouth daily. 90 Cap 1    ketoconazole (NIZORAL) 2 % topical cream APPLY 1 APPLICATION 2 TIMES A DAY FOR 6 WEEKS  1    TROKENDI XR 50 mg capsule TAKE 1 CAPSULE BY MOUTH EVERY DAY AT NIGHT 30 Cap 5    sodium chloride (NORMAL SALINE FLUSH) 5-10 mL by IntraVENous route daily. before and after IV abx administration      heparin, porcine, pf (HEPARIN LOCKFLUSH,PORCINE,,PF,) 10 unit/mL injection 10 Units by IntraVENous route daily.  administer after ABX, NS      triamcinolone acetonide (KENALOG) 0.147 mg/gram aero topical spray       clobetasol (TEMOVATE) 0.05 % ointment       lidocaine-tetracaine 7-7 % crea       nystatin (MYCOSTATIN) 100,000 unit/gram ointment Apply  to affected area two (2) times a day. 15 g 0    nystatin (MYCOSTATIN) powder Apply  to affected area four (4) times daily. 1 Bottle 1     No current facility-administered medications on file prior to visit. Imaging:    CT Results (recent):  Results from Hospital Encounter encounter on 10/15/19   CT ABD PELV W CONT    Narrative EXAM: CT ABD PELV W CONT    INDICATION: right sided abdominal pain    COMPARISON: None     CONTRAST: 100 mL of Isovue-370. TECHNIQUE:   Following the uneventful intravenous administration of contrast, thin axial  images were obtained through the abdomen and pelvis. Coronal and sagittal  reconstructions were generated. Oral contrast was not administered. CT dose  reduction was achieved through use of a standardized protocol tailored for this  examination and automatic exposure control for dose modulation. FINDINGS:   LUNG BASES: Clear. INCIDENTALLY IMAGED HEART AND MEDIASTINUM: Unremarkable. LIVER: No biliary dilatation. Right hepatic macrolobulated 11 mm structure  (series 2, image 14) with density measurement of the 3 compatible with a simple  cyst.  GALLBLADDER: Unremarkable. SPLEEN: No mass. PANCREAS: No mass or ductal dilatation. ADRENALS: Unremarkable. KIDNEYS: No calculus, or hydronephrosis. Right upper renal 1 cm hypodensity  (image coronal 76 with density of 30 HU  STOMACH: Unremarkable. SMALL BOWEL: No dilatation or wall thickening. COLON: No dilatation or wall thickening. APPENDIX: Unremarkable. Coronal image 43). PERITONEUM: Small amount of complex free fluid in the cul-de-sac. RETROPERITONEUM: No lymphadenopathy or aortic aneurysm. REPRODUCTIVE ORGANS: Normal uterus. Right ovarian cyst measures 3.9 x 2.8 cm and  has a density measurement of 17 HU. URINARY BLADDER: No mass or calculus. BONES: No destructive bone lesion. Levoconvex mid lumbar scoliosis  ADDITIONAL COMMENTS: Fat-containing umbilical hernia with neck measurement of 7  mm in outer diameter of 18 mm.  Superimposed on a diastases. Impression IMPRESSION:  Small amount of complex free fluid. Right ovarian complex cyst.   Normal appendix. Right renal hypodensity, too's small to further characterize. Ultrasound  characterization is recommended. MRI Results (recent):  Results from [place] encounter on 09/14/18   MRI BRAIN W WO CONT    Narrative EXAM: MRI BRAIN     INDICATION:   Include scan of orbits bilaterally. Rule out MS and Optic  Neuritis per Ophthalmology   Vision loss     SEQUENCES:   Sagittal T1, axial T1, T2, GRE and FLAIR; axial and coronal T1 post  enhancement; and diffusion imaging of the brain. 19 mL of Dotarem. Thin section  imaging is also performed through the orbits with and without contrast.    FINDINGS:     Orbital soft tissues, optic nerves and chiasm are normal in size, morphology,  signal intensity and enhancement. There are a few punctate white matter foci of T2 hyperintensity in the left  parietal lobe, nonspecific. Remainder of the brain including corpus callosum and  pericallosal white matter show normal signal intensity. The enhancement is  normal.      There is no evidence for mass, hemorrhage, shift, or hydrocephalus. There is no  extra-axial fluid collection. Diffusion imaging shows no diffusion restriction  to indicate acute infarct/ischemia or other process. Impression IMPRESSION: Few nonspecific left parietal white matter T2 hyperintensities. Normal appearance of the orbits. Normal diffusion and enhancement. IR Results (recent):  No results found for this or any previous visit.     VAS/US Results (recent):  Results from [place] encounter on 10/31/18   DUPLEX LOWER EXT VENOUS RIGHT       Reviewed [facility] in [facility] and media tab today    Lab Review     Admission on 10/15/2019, Discharged on 10/16/2019   Component Date Value Ref Range Status    WBC 10/15/2019 7.0  3.6 - 11.0 K/uL Final    RBC 10/15/2019 4.38  3.80 - 5.20 M/uL Final    HGB 10/15/2019 12.5  11.5 - 16.0 g/dL Final    HCT 10/15/2019 39.7  35.0 - 47.0 % Final    MCV 10/15/2019 90.6  80.0 - 99.0 FL Final    MCH 10/15/2019 28.5  26.0 - 34.0 PG Final    MCHC 10/15/2019 31.5  30.0 - 36.5 g/dL Final    RDW 10/15/2019 14.0  11.5 - 14.5 % Final    PLATELET 39/09/3809 321  150 - 400 K/uL Final    MPV 10/15/2019 9.8  8.9 - 12.9 FL Final    NRBC 10/15/2019 0.0  0  WBC Final    ABSOLUTE NRBC 10/15/2019 0.00  0.00 - 0.01 K/uL Final    NEUTROPHILS 10/15/2019 50  32 - 75 % Final    LYMPHOCYTES 10/15/2019 40  12 - 49 % Final    MONOCYTES 10/15/2019 7  5 - 13 % Final    EOSINOPHILS 10/15/2019 2  0 - 7 % Final    BASOPHILS 10/15/2019 1  0 - 1 % Final    IMMATURE GRANULOCYTES 10/15/2019 0  0.0 - 0.5 % Final    ABS. NEUTROPHILS 10/15/2019 3.5  1.8 - 8.0 K/UL Final    ABS. LYMPHOCYTES 10/15/2019 2.8  0.8 - 3.5 K/UL Final    ABS. MONOCYTES 10/15/2019 0.5  0.0 - 1.0 K/UL Final    ABS. EOSINOPHILS 10/15/2019 0.1  0.0 - 0.4 K/UL Final    ABS. BASOPHILS 10/15/2019 0.0  0.0 - 0.1 K/UL Final    ABS. IMM. GRANS. 10/15/2019 0.0  0.00 - 0.04 K/UL Final    DF 10/15/2019 AUTOMATED    Final    Sodium 10/15/2019 141  136 - 145 mmol/L Final    Potassium 10/15/2019 3.7  3.5 - 5.1 mmol/L Final    Chloride 10/15/2019 111* 97 - 108 mmol/L Final    CO2 10/15/2019 23  21 - 32 mmol/L Final    Anion gap 10/15/2019 7  5 - 15 mmol/L Final    Glucose 10/15/2019 90  65 - 100 mg/dL Final    BUN 10/15/2019 16  6 - 20 MG/DL Final    Creatinine 10/15/2019 0.88  0.55 - 1.02 MG/DL Final    BUN/Creatinine ratio 10/15/2019 18  12 - 20   Final    GFR est AA 10/15/2019 >60  >60 ml/min/1.73m2 Final    GFR est non-AA 10/15/2019 >60  >60 ml/min/1.73m2 Final    Comment: Estimated GFR is calculated using the IDMS-traceable Modification of Diet in Renal Disease (MDRD) Study equation, reported for both  Americans (GFRAA) and non- Americans (GFRNA), and normalized to 1.73m2 body surface area. The physician must decide which value applies to the patient. The MDRD study equation should only be used in individuals age 25 or older. It has not been validated for the following: pregnant women, patients with serious comorbid conditions, or on certain medications, or persons with extremes of body size, muscle mass, or nutritional status.  Calcium 10/15/2019 8.7  8.5 - 10.1 MG/DL Final    Bilirubin, total 10/15/2019 0.2  0.2 - 1.0 MG/DL Final    ALT (SGPT) 10/15/2019 38  12 - 78 U/L Final    AST (SGOT) 10/15/2019 24  15 - 37 U/L Final    Alk. phosphatase 10/15/2019 64  45 - 117 U/L Final    Protein, total 10/15/2019 7.6  6.4 - 8.2 g/dL Final    Albumin 10/15/2019 3.5  3.5 - 5.0 g/dL Final    Globulin 10/15/2019 4.1* 2.0 - 4.0 g/dL Final    A-G Ratio 10/15/2019 0.9* 1.1 - 2.2   Final    Lipase 10/15/2019 267  73 - 393 U/L Final    Color 10/15/2019 YELLOW/STRAW    Final    Color Reference Range: Straw, Yellow or Dark Yellow    Appearance 10/15/2019 CLEAR  CLEAR   Final    Specific gravity 10/15/2019 1.030  1.003 - 1.030   Final    pH (UA) 10/15/2019 5.5  5.0 - 8.0   Final    Protein 10/15/2019 NEGATIVE   NEG mg/dL Final    Glucose 10/15/2019 NEGATIVE   NEG mg/dL Final    Ketone 10/15/2019 NEGATIVE   NEG mg/dL Final    Bilirubin 10/15/2019 NEGATIVE   NEG   Final    Blood 10/15/2019 NEGATIVE   NEG   Final    Urobilinogen 10/15/2019 1.0  0.2 - 1.0 EU/dL Final    Nitrites 10/15/2019 NEGATIVE   NEG   Final    Leukocyte Esterase 10/15/2019 NEGATIVE   NEG   Final    WBC 10/15/2019 0-4  0 - 4 /hpf Final    RBC 10/15/2019 0-5  0 - 5 /hpf Final    Epithelial cells 10/15/2019 FEW  FEW /lpf Final    Epithelial cell category consists of squamous cells and /or transitional urothelial cells. Renal tubular cells, if present, are separately identified as such.     Bacteria 10/15/2019 NEGATIVE   NEG /hpf Final    Mucus 10/15/2019 1+* NEG /lpf Final    SAMPLES BEING HELD 10/15/2019 1BLU   Final    COMMENT 10/15/2019 Add-on orders for these samples will be processed based on acceptable specimen integrity and analyte stability, which may vary by analyte. Final    HCG, Ql. 10/15/2019 NEGATIVE   NEG   Final    The serum pregnancy test becomes positive at about the time of implantation of the conceptus and approximates a quantitative bHCG value of >5 mIU/ML. Objective:       Exam:  Visit Vitals  /78 (BP 1 Location: Right arm, BP Patient Position: Sitting)   Pulse 68   Temp 98.6 °F (37 °C) (Oral)   Resp 16   Ht 5' 6\" (1.676 m)   Wt 101.2 kg (223 lb)   SpO2 98%   BMI 35.99 kg/m²     Gen: Awake, alert, follows commands  Appropriate appearance, normal speech. Oriented to all spheres. No visual field defect on confrontation exam.  Full eyes movement, with no nystagmus, no diplopia, no ptosis. Normal gag and swallow. All remaining cranial nerves were normal  Motor function: 5/5 in all extremities  Sensory: intact to LT, PP and JPS  DTRs ++ in all extremities, (-) Babinski  Good FTN and HTS   Gait: Normal    Assessment:       ICD-10-CM ICD-9-CM    1. Chronic low back pain, unspecified back pain laterality, unspecified whether sciatica present M54.5 724.2 REFERRAL TO PAIN MANAGEMENT    G89.29 338.29    2. Intractable migraine with aura with status migrainosus G43. 111 346.03    3. Fibromyalgia M79.7 729.1            Plan:   1. Will refer to pain management to address chronic lower back pain (patient does not want surgery)  2. Continue Cymbalta 60 mg every day for fibromyalgia/chronic pain, headache and anxiety/depression  3. Continue Cambia prn to abort headache  4. Continue headache diary and list that can trigger headache            Follow-up and Dispositions    · Return if symptoms worsen or fail to improve.                Steffany Brennan MD  Diplomate, American Board of Psychiatry and Neurology  Diplomate, Neuromuscular Medicine  Diplomate, American Board of Electrodiagnostic Medicine

## 2020-01-13 NOTE — LETTER
1/13/20 Patient: Jerry Polanco YOB: 1980 Date of Visit: 1/13/2020 Cherie Lawrence, [address] [id number] VIA In Basket Dear Cherie Lawrence DO, Thank you for referring Ms. Merna Klinefelter to [facility] for evaluation. My notes for this consultation are attached. If you have questions, please do not hesitate to call me. I look forward to following your patient along with you. Sincerely, Ellie Weber MD

## 2020-01-13 NOTE — PROGRESS NOTES
Chief Complaint   Patient presents with    Follow-up     Patient is here c/o pain all over her body. She is taking the Cymbalta 60mg as suggested last week. Patient states her back is killing her. When she tries to lay down it cramps up and also c/o swelling in her feet.      Visit Vitals  /78 (BP 1 Location: Right arm, BP Patient Position: Sitting)   Pulse 68   Temp 98.6 °F (37 °C) (Oral)   Resp 16   Ht 5' 6\" (1.676 m)   Wt 101.2 kg (223 lb)   SpO2 98%   BMI 35.99 kg/m²

## 2020-01-16 ENCOUNTER — TELEPHONE (OUTPATIENT)
Dept: NEUROLOGY | Age: 40
End: 2020-01-16

## 2020-01-16 NOTE — TELEPHONE ENCOUNTER
----- Message from Eugenie Donahue sent at 1/16/2020  9:35 AM EST -----  Regarding: Dr. Laura Montes first and last name: June Marcano      Reason for call:information to a pain specialist      Callback required yes/no and why:yes      Best contact number(s):[phone]      Details to clarify the request: Pt requested a call from the nurse regarding a pain specialist.      Eugenie Donahue

## 2020-01-16 NOTE — TELEPHONE ENCOUNTER
Called patient back. She states she has called all of the pain management docs around and no one takes her insurance ([employer] and wonders --what does she do now? Please advise.

## 2020-03-10 ENCOUNTER — OFFICE VISIT (OUTPATIENT)
Dept: FAMILY MEDICINE CLINIC | Age: 40
End: 2020-03-10

## 2020-03-10 VITALS
BODY MASS INDEX: 36 KG/M2 | HEIGHT: 66 IN | OXYGEN SATURATION: 99 % | SYSTOLIC BLOOD PRESSURE: 107 MMHG | WEIGHT: 224 LBS | DIASTOLIC BLOOD PRESSURE: 71 MMHG | RESPIRATION RATE: 17 BRPM | TEMPERATURE: 98.5 F | HEART RATE: 85 BPM

## 2020-03-10 DIAGNOSIS — J20.9 ACUTE BRONCHITIS, UNSPECIFIED ORGANISM: Primary | ICD-10-CM

## 2020-03-10 DIAGNOSIS — J30.89 ENVIRONMENTAL AND SEASONAL ALLERGIES: ICD-10-CM

## 2020-03-10 LAB
S PYO AG THROAT QL: NEGATIVE
VALID INTERNAL CONTROL?: YES

## 2020-03-10 RX ORDER — DOXYCYCLINE 100 MG/1
100 TABLET ORAL 2 TIMES DAILY
Qty: 10 TAB | Refills: 0 | Status: SHIPPED | OUTPATIENT
Start: 2020-03-10 | End: 2020-03-15

## 2020-03-10 RX ORDER — BENZONATATE 100 MG/1
100 CAPSULE ORAL
COMMUNITY
End: 2021-11-10

## 2020-03-10 RX ORDER — PROMETHAZINE HYDROCHLORIDE AND CODEINE PHOSPHATE 6.25; 1 MG/5ML; MG/5ML
SOLUTION ORAL
COMMUNITY
End: 2021-11-10

## 2020-03-10 RX ORDER — NORTRIPTYLINE HYDROCHLORIDE 25 MG/1
CAPSULE ORAL
COMMUNITY
End: 2021-11-10

## 2020-03-10 NOTE — PATIENT INSTRUCTIONS
Bronchitis: Care Instructions Your Care Instructions Bronchitis is inflammation of the bronchial tubes, which carry air to the lungs. The tubes swell and produce mucus, or phlegm. The mucus and inflamed bronchial tubes make you cough. You may have trouble breathing. Most cases of bronchitis are caused by viruses like those that cause colds. Antibiotics usually do not help and they may be harmful. Bronchitis usually develops rapidly and lasts about 2 to 3 weeks in otherwise healthy people. Follow-up care is a key part of your treatment and safety. Be sure to make and go to all appointments, and call your doctor if you are having problems. It's also a good idea to know your test results and keep a list of the medicines you take. How can you care for yourself at home? · Take all medicines exactly as prescribed. Call your doctor if you think you are having a problem with your medicine. · Get some extra rest. 
· Take an over-the-counter pain medicine, such as acetaminophen (Tylenol), ibuprofen (Advil, Motrin), or naproxen (Aleve) to reduce fever and relieve body aches. Read and follow all instructions on the label. · Do not take two or more pain medicines at the same time unless the doctor told you to. Many pain medicines have acetaminophen, which is Tylenol. Too much acetaminophen (Tylenol) can be harmful. · Take an over-the-counter cough medicine that contains dextromethorphan to help quiet a dry, hacking cough so that you can sleep. Avoid cough medicines that have more than one active ingredient. Read and follow all instructions on the label. · Breathe moist air from a humidifier, hot shower, or sink filled with hot water. The heat and moisture will thin mucus so you can cough it out. · Do not smoke. Smoking can make bronchitis worse. If you need help quitting, talk to your doctor about stop-smoking programs and medicines. These can increase your chances of quitting for good. When should you call for help? Call 911 anytime you think you may need emergency care. For example, call if: 
  · You have severe trouble breathing.  
 Call your doctor now or seek immediate medical care if: 
  · You have new or worse trouble breathing.  
  · You cough up dark brown or bloody mucus (sputum).  
  · You have a new or higher fever.  
  · You have a new rash.  
 Watch closely for changes in your health, and be sure to contact your doctor if: 
  · You cough more deeply or more often, especially if you notice more mucus or a change in the color of your mucus.  
  · You are not getting better as expected. Where can you learn more? Go to http://sarmad-farhana.info/. Enter H333 in the search box to learn more about \"Bronchitis: Care Instructions. \" Current as of: June 9, 2019 Content Version: 12.2 © 1071-5325 [employer]. Care instructions adapted under license by [employer] (which disclaims liability or warranty for this information). If you have questions about a medical condition or this instruction, always ask your healthcare professional. [address] any warranty or liability for your use of this information.

## 2020-03-10 NOTE — PROGRESS NOTES
Subjective:   Anne Lagunas is an 44 y.o. female who presents for cough. HPI  Chief Complaint   Patient presents with    Cough     times one month    Allergies     The patient reports having persistent dry cough for the last month. Initially the cough was dry but became productive associated with some chest congestion for the last 2 weeks. No nasal congestion, no fever, no chills, no body aches. She was seen in the ER yesterday and was diagnosed with bronchitis. Per patient CXR was done, which was normal. She was prescribed Albuterol inhaler and Tessalon. She was using the medications without any improvement. The patient denies having any history asthma, however she was using steroid inhaler in the past. Never had PFT testing. Off note, the patient has underlying seasonal allergies and takes Zyrtec for that. She also thinks that she may be allergic to the cats as she noticed that her cough sometimes is worse when she is around the animals. She has never seen the allergist.       Allergies - reviewed: Allergies   Allergen Reactions    Other Food Swelling     All nuts except peanuts    Morphine Rash     Rash on back and legs    Tramadol Nausea and Vomiting    Nut - Unspecified Swelling    [place] Swelling     Swollen lips         Medications - reviewed:  Current Outpatient Medications   Medication Sig    milnacipran (SAVELLA) 25 mg tablet Take  by mouth.  nortriptyline (PAMELOR) 25 mg capsule Take  by mouth nightly.  promethazine-codeine (PHENERGAN WITH CODEINE) 6.25-10 mg/5 mL syrup Take  by mouth four (4) times daily as needed for Cough.  benzonatate (TESSALON) 100 mg capsule Take 100 mg by mouth three (3) times daily as needed for Cough.  doxycycline (ADOXA) 100 mg tablet Take 1 Tab by mouth two (2) times a day for 5 days.  DULoxetine (CYMBALTA) 30 mg capsule Take 1 Cap by mouth daily.     ondansetron (ZOFRAN ODT) 4 mg disintegrating tablet Take 1 Tab by mouth every eight (8) hours as needed for Nausea.  albuterol (PROVENTIL VENTOLIN) 2.5 mg /3 mL (0.083 %) nebulizer solution by Nebulization route every four (4) hours as needed.  QVAR REDIHALER 40 mcg/actuation HFAb inhaler Take 40 mcg by inhalation two (2) times a day.  diclofenac potassium (CATAFLAM) 50 mg tablet Take one tablet by mouth  at onset of headache, may take another 1 tab in 4 hrs if needed with meals.  ketoconazole (NIZORAL) 2 % topical cream APPLY 1 APPLICATION 2 TIMES A DAY FOR 6 WEEKS    TROKENDI XR 50 mg capsule TAKE 1 CAPSULE BY MOUTH EVERY DAY AT NIGHT    sodium chloride (NORMAL SALINE FLUSH) 5-10 mL by IntraVENous route daily. before and after IV abx administration    heparin, porcine, pf (HEPARIN LOCKFLUSH,PORCINE,,PF,) 10 unit/mL injection 10 Units by IntraVENous route daily. administer after ABX, NS    triamcinolone acetonide (KENALOG) 0.147 mg/gram aero topical spray     clobetasol (TEMOVATE) 0.05 % ointment     lidocaine-tetracaine 7-7 % crea     nystatin (MYCOSTATIN) 100,000 unit/gram ointment Apply  to affected area two (2) times a day.  propranolol (INDERAL) 10 mg tablet Take 10 mg by mouth nightly as needed.  hydrocortisone (HYTONE) 2.5 % topical cream Apply  to affected area two (2) times a day. No current facility-administered medications for this visit. Past Medical History - reviewed:  Past Medical History:   Diagnosis Date    Anxiety     Arthritis     Depression     Headache     Ill-defined condition     scoliosis, gall stones, anemia    Lyme disease     Scoliosis     Seizures ([facility] [address]        Review of Systems   CONSTITUTIONAL: denies fever. Denies chills. ENT: denies sinus congestion. Denies sinus drainage  CARDIOVASCULAR: denies chest pain. Denies palpitations  RESPIRATORY: +cough,denies shortness of breath  GI: denies abdominal pain. Denies change in stools.  Denies hematochezia/melana      Objective:     Visit Vitals  /71   Pulse 85   Temp 98.5 °F (36.9 °C) (Oral)   Resp 17   Ht 5' 6\" (1.676 m)   Wt 224 lb (101.6 kg)   SpO2 99%   BMI 36.15 kg/m²       General appearance - alert, well appearing, and in no distress, intermittently coughing during the exam  Eyes - pupils equal and reactive, extraocular eye movements intact  Ears - bilateral TM's and external ear canals normal  Nose - normal and patent, no erythema, discharge or polyps  Mouth - mucous membranes moist, pharynx is slightly erythematous without lesions  Neck - supple, no significant adenopathy  Chest - clear to auscultation, no wheezes, rales or rhonchi, symmetric air entry  Heart - normal rate, regular rhythm, normal S1, S2, no murmurs, rubs, clicks or gallops    Assessment:   Stew Mclaughlin is a 44 y.o. female who was seen today for:    ICD-10-CM ICD-9-CM    1. Acute bronchitis, unspecified organism J20.9 466.0 AMB POC RAPID STREP A      REFERRAL TO ALLERGY   2. Environmental and seasonal allergies J30.89 477.8      The patient is well appearing, NAD, VSS. The cough is likely the combination of infection and underlying allergies. The record from ER was personally reviewed, labs (CBC,CMP, troponin ) WNL, CXR is negative for acute process, EKG WNL. Rapid strep is negative    Plan:   · Given the prolong course of the symptoms with treat as bacterial infection with Doxycycline to cover atypical bacteria  · Continue Flonase and Tessalon  · Can stop Albuterol as no wheezing on exam  · Referral to allergist for formal allergy testing, will need PFTs for evaluation of possible underlying asthma   · Strict ER precautions were given          I have discussed the diagnosis with the patient and the intended plan as seen in the above orders. The patient has received an after-visit summary and questions were answered concerning future plans. I have discussed medication side effects and warnings with the patient as well. Informed pt to return to the office if new symptoms arise.       Spike Cohn Javier Rizo MD  Family Medicine Physician

## 2020-03-10 NOTE — LETTER
NOTIFICATION RETURN TO WORK 
 
3/10/2020 3:10 PM 
 
Ms. Phil Escobar [address] [facility] [address] To Whom It May Concern: 
 
Phil Escobar is currently under the care of [address] [employer]. She will return to work on: Tuesday, March 10, 2020 If there are questions or concerns please have the patient contact our office. Sincerely, Sofy Berry MD

## 2020-03-10 NOTE — PROGRESS NOTES
Chief Complaint   Patient presents with    Cough     times one month    Chest Pain     1. Have you been to the ER, urgent care clinic since your last visit? Hospitalized since your last visit? Yes.  [facility] for Bronchitis. Records obtained. 2. Have you seen or consulted any other health care providers outside of the [address] [place] [address] since your last visit? Include any pap smears or colon screening.  No

## 2020-03-19 ENCOUNTER — HOSPITAL ENCOUNTER (EMERGENCY)
Age: 40
Discharge: HOME | End: 2020-03-19
Attending: EMERGENCY MEDICINE
Payer: MEDICAID

## 2020-03-19 ENCOUNTER — APPOINTMENT (OUTPATIENT)
Dept: GENERAL RADIOLOGY | Age: 40
End: 2020-03-19
Attending: NURSE PRACTITIONER
Payer: MEDICAID

## 2020-03-19 VITALS
RESPIRATION RATE: 16 BRPM | SYSTOLIC BLOOD PRESSURE: 118 MMHG | DIASTOLIC BLOOD PRESSURE: 71 MMHG | HEIGHT: 67 IN | WEIGHT: 225 LBS | TEMPERATURE: 98.9 F | HEART RATE: 97 BPM | BODY MASS INDEX: 35.31 KG/M2 | OXYGEN SATURATION: 97 %

## 2020-03-19 DIAGNOSIS — M94.0 COSTOCHONDRITIS: Primary | ICD-10-CM

## 2020-03-19 PROCEDURE — 93005 ELECTROCARDIOGRAM TRACING: CPT

## 2020-03-19 PROCEDURE — 99283 EMERGENCY DEPT VISIT LOW MDM: CPT

## 2020-03-19 PROCEDURE — 71045 X-RAY EXAM CHEST 1 VIEW: CPT

## 2020-03-19 RX ORDER — NAPROXEN 500 MG/1
500 TABLET ORAL 2 TIMES DAILY WITH MEALS
Qty: 20 TAB | Refills: 0 | Status: SHIPPED | OUTPATIENT
Start: 2020-03-19 | End: 2020-03-29

## 2020-03-19 RX ORDER — PREDNISONE 10 MG/1
TABLET ORAL
Qty: 39 TAB | Refills: 0 | Status: SHIPPED | OUTPATIENT
Start: 2020-03-19 | End: 2021-11-10

## 2020-03-19 RX ORDER — LIDOCAINE 50 MG/G
3 PATCH TOPICAL EVERY 24 HOURS
Qty: 30 EACH | Refills: 0 | Status: SHIPPED | OUTPATIENT
Start: 2020-03-19 | End: 2021-11-10

## 2020-03-19 RX ORDER — METHOCARBAMOL 750 MG/1
750 TABLET, FILM COATED ORAL 4 TIMES DAILY
Qty: 20 TAB | Refills: 0 | Status: SHIPPED | OUTPATIENT
Start: 2020-03-19 | End: 2021-11-10

## 2020-03-19 NOTE — ED NOTES
Pt verbalized understanding of all discharge instructions associated with respiratory illness. Pt advised to self quarantine from other people and animals for 14 days, practice social isolation, wash hands, clean all \"high touch\" surfaces everyday, monitor symptoms from home, and call doctor before showing up. Advised pt to seek medical attention if experiencing worsening symptoms or new concerns such as sever shortness of breath, chest pain, etc.     Patient does not appear to be in any acute distress/shows no evidence of clinical instability at this time. Provider has reviewed discharge instructions with the patient/family. The patient/family verbalized understanding instructions as well as need for follow up for any further symptoms. Discharge papers given, education provided, and any questions answered. Patient discharged by provider.

## 2020-03-19 NOTE — ED PROVIDER NOTES
Initial Complaint: chest pain    Started: Midnight    Endorses: Had similar episodes of chest pain in the past. CP is non-exertional. Has been on Tessalon, Phenergan VC and Albuterol inhaler and was given these at the beginning of March at [facility]. Seen at [facility] 2 days alter and  was  Told to stop the ihaler due to worsening CP. Some nausea last night. Coughing up mucous as well with some sneezing. Denies: Fevers, chills, nausea, vomiting, sore throat. 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 Made better: nothing  Made worse: nothing    No further complaints. Past Medical History:  No date:  Anxiety  No date: Arthritis  No date: Depression  No date: Headache  No date: Ill-defined condition      Comment:  scoliosis, gall stones, anemia  No date: Lyme disease  No date: Scoliosis  No date: Seizures [facility])  Past Surgical History:  No date: HX  SECTION      Comment:  x 4  2017: HX CHOLECYSTECTOMY  No date: HX HERNIA REPAIR  No date: HX OTHER SURGICAL      Comment:  PIC line placed for Lymes   No date: HX TUBAL LIGATION  Reviewed      Primary care provider: Deondre Badillo DO             Past Medical History:   Diagnosis Date    Anxiety     Arthritis     Depression     Headache     Ill-defined condition     scoliosis, gall stones, anemia    Lyme disease     Scoliosis     Seizures ([facility] [address]      Past Surgical History:   Procedure Laterality Date    HX  SECTION      x 4    HX CHOLECYSTECTOMY  2017    HX HERNIA REPAIR      HX OTHER SURGICAL      PIC line placed for Lymes     HX TUBAL LIGATION       Family History:   Problem Relation Age of Onset    Arthritis-rheumatoid Mother     Depression Mother     Depression Father     Diabetes Father     Cancer Maternal Aunt     Cancer Maternal Uncle     Cancer Paternal Aunt     Cancer Paternal Uncle     Heart Attack Maternal Grandmother     Cancer Maternal Grandfather      Social History     Socioeconomic History    Marital status: SINGLE Spouse name: Not on file    Number of children: Not on file    Years of education: Not on file    Highest education level: Not on file   Occupational History    Not on file   Social Needs    Financial resource strain: Not on file    Food insecurity     Worry: Not on file     Inability: Not on file    Transportation needs     Medical: Not on file     Non-medical: Not on file   Tobacco Use    Smoking status: Never Smoker    Smokeless tobacco: Never Used   Substance and Sexual Activity    Alcohol use: No    Drug use: No    Sexual activity: Not Currently   Lifestyle    Physical activity     Days per week: Not on file     Minutes per session: Not on file    Stress: Not on file   Relationships    Social connections     Talks on phone: Not on file     Gets together: Not on file     Attends Spiritism service: Not on file     Active member of club or organization: Not on file     Attends meetings of clubs or organizations: Not on file     Relationship status: Not on file    Intimate partner violence     Fear of current or ex partner: Not on file     Emotionally abused: Not on file     Physically abused: Not on file     Forced sexual activity: Not on file   Other Topics Concern    Not on file   Social History Narrative    Not on file     ALLERGIES: Other food; Morphine; Tramadol; Nut - unspecified; and [address]    Review of Systems   HENT: Positive for sneezing. Respiratory: Positive for cough. Cardiovascular: Positive for chest pain. Gastrointestinal: Positive for nausea. Negative for vomiting. Psychiatric/Behavioral: Negative. Vitals:    03/19/20 1442   BP: 118/71   Pulse: 97   Resp: 16   Temp: 98.9 °F (37.2 °C)   SpO2: 97%   Weight: 102.1 kg (225 lb)   Height: 5' 7\" (1.702 m)          Physical Exam  Vitals signs and nursing note reviewed. Constitutional:       Appearance: She is well-developed. HENT:      Head: Normocephalic and atraumatic.    Neck:      Musculoskeletal: Normal range of motion and neck supple. Cardiovascular:      Rate and Rhythm: Normal rate and regular rhythm. Heart sounds: Normal heart sounds. Pulmonary:      Effort: Pulmonary effort is normal. No respiratory distress. Breath sounds: Normal breath sounds. No wheezing or rales. Chest:      Chest wall: No tenderness. Abdominal:      General: Bowel sounds are normal.      Palpations: Abdomen is soft. Tenderness: There is no abdominal tenderness. There is no guarding. Musculoskeletal: Normal range of motion. Skin:     General: Skin is warm and dry. Findings: No erythema. Neurological:      Mental Status: She is alert and oriented to person, place, and time. Psychiatric:         Behavior: Behavior normal.         Thought Content: Thought content normal.         Judgment: Judgment normal.          MDM       Procedures    ED EKG interpretation: 2:48 PM  Rhythm: normal sinus rhythm; and regular . Rate (approx.): 80; Axis: normal; P wave: normal; QRS interval: normal ; ST/T wave: normal; Other findings: borderline ekg. This EKG was interpreted by Beverly Boyd MD,ED Provider. Assessment & Plan:     Orders Placed This Encounter    XR CHEST PORT       Discussed with Beverly Boyd MD,ED Provider    Florence Alexis NP  03/19/20  3:36 PM      Chest x-ray without acute findings. Tenderness to palpation on exam indicates a likely costochondritis. EKG without acute changes. Prednisone, Naprosyn, lidocaine patches and Robaxin for symptoms. PCP follow-up. Discussed return precautions. 4:40 PM  Patient re-evaluated. All questions answered. Patient appropriate for discharge. Given return precautions and follow up instructions. LABORATORY TESTS:  Labs Reviewed - No data to display    IMAGING RESULTS:  XR CHEST PORT   Final Result   IMPRESSION: Normal chest.          MEDICATIONS GIVEN:  Medications - No data to display    IMPRESSION:  1. Costochondritis        PLAN:  1. Current Discharge Medication List      START taking these medications    Details   naproxen (Naprosyn) 500 mg tablet Take 1 Tab by mouth two (2) times daily (with meals) for 10 days. Indications: costochondritis  Qty: 20 Tab, Refills: 0      lidocaine (Lidoderm) 5 % 3 Patches by TransDERmal route every twenty-four (24) hours. Apply patch to the affected area for 12 hours a day and remove for 12 hours a day. Qty: 30 Each, Refills: 0      methocarbamoL (Robaxin-750) 750 mg tablet Take 1 Tab by mouth four (4) times daily. As needed for muscle spasm  Indications: muscle spasm  Qty: 20 Tab, Refills: 0      predniSONE (DELTASONE) 10 mg tablet Take with breakfast daily on a taper: 6 tabs PO for 3 days then 4 tabs PO for 3 days then 2 tabs PO for 3 days then 1 tab PO for 3 days  Qty: 39 Tab, Refills: 0           2. Follow-up Information     Follow up With Specialties Details Why [address] [facility], James Morales, DO Family Practice Schedule an appointment as soon as possible for a visit As needed [address] [place] [address]  [phone]      [facility] EMERGENCY DEPT Emergency Medicine  As needed [address] [facility]  [phone]        3. Return to ED for new or worsening symptoms       Koby Richmond NP    Please note that this dictation was completed with [employer], the computer voice recognition software. Quite often unanticipated grammatical, syntax, homophones, and other interpretive errors are inadvertently transcribed by the computer software. Please disregard these errors. Please excuse any errors that have escaped final proofreading.

## 2020-03-19 NOTE — ED TRIAGE NOTES
Pt arrives with c/o intermittent chest pain that was worst last night. Pt was prescribed promethazine/codeine and Pro air inhaler with spacer at the beginning of this month after being seen for cough by PCP. Pt saw [address] for chest pain and was told to stop inhaler. C/o nausea without vomiting last night.

## 2020-03-19 NOTE — DISCHARGE INSTRUCTIONS
Thank you for allowing us to care for you today. Please follow-up with your Primary Care provider in the next 2-3 days if your symptoms do not improve. Plan for home:       Lidoderm 5% patch: apply & leave in place for 12 hours. Then remove and leave off for 12 hours. If the 5% patch is not covered by your insurance try the 4% patch that is available Over-the-counter. It is made by [employer] or [employer]. [employer] and [employer] are the least expensive. [employer] was the most expensive. Robaxin up to 4 times daily as needed for muscle spasm. No driving while on this medication. Prednisone on a taper as directed. Take before lunch each day as directed. Taking too late in the day can cause a disruption in your sleep cycle. Naprosyn 500 mg twice daily for 10 days. Come back to the ER if you have worsening symptoms, fevers over 100.9, shaking chills, nausea or vomiting. Patient Education        Costochondritis: Care Instructions  Your Care Instructions  You have chest pain because the cartilage of your rib cage is inflamed. This problem is called costochondritis. This type of chest wall pain may last from days to weeks. It is not a heart problem. Sometimes costochondritis occurs with a cold or the flu, and other times the exact cause is not known. Follow-up care is a key part of your treatment and safety. Be sure to make and go to all appointments, and call your doctor if you are having problems. It's also a good idea to know your test results and keep a list of the medicines you take. How can you care for yourself at home? · Take medicines for pain and inflammation exactly as directed. ? If the doctor gave you a prescription medicine, take it as prescribed. ? If you are not taking a prescription pain medicine, ask your doctor if you can take an over-the-counter medicine. ? Do not take two or more pain medicines at the same time unless the doctor told you to.  Many pain medicines have acetaminophen, which is Tylenol. Too much acetaminophen (Tylenol) can be harmful. · It may help to use a warm compress or heating pad (set on low) on your chest. You can also try alternating heat and ice. Put ice or a cold pack on the area for 10 to 20 minutes at a time. Put a thin cloth between the ice and your skin. · Avoid any activity that strains the chest area. As your pain gets better, you can slowly return to your normal activities. · Do not use tape, an elastic bandage, a \"rib belt,\" or anything else that restricts your chest wall motion. When should you call for help? Call 911 anytime you think you may need emergency care. For example, call if:    · You have new or different chest pain or pressure. This may occur with:  ? Sweating. ? Shortness of breath. ? Nausea or vomiting. ? Pain that spreads from the chest to the neck, jaw, or one or both shoulders or arms. ? Dizziness or lightheadedness. ? A fast or uneven pulse. After calling  911, chew 1 adult-strength aspirin. Wait for an ambulance. Do not try to drive yourself.     · You have severe trouble breathing.    Call your doctor now or seek immediate medical care if:    · You have a fever or cough.     · You have any trouble breathing.     · Your chest pain gets worse.    Watch closely for changes in your health, and be sure to contact your doctor if:    · Your chest pain continues even though you are taking anti-inflammatory medicine.     · Your chest wall pain has not improved after 5 to 7 days. Where can you learn more? Go to http://sarmad-farhana.info/  Enter [id number] in the search box to learn more about \"Costochondritis: Care Instructions. \"  Current as of: June 26, 2019Content Version: 12.4  © 7834-0485 [employer] Inc[employer]. Care instructions adapted under license by [employer] (which disclaims liability or warranty for this information).  If you have questions about a medical condition or this instruction, always ask your healthcare professional. [place] [address] any warranty or liability for your use of this information.

## 2020-03-20 ENCOUNTER — TELEPHONE (OUTPATIENT)
Dept: FAMILY MEDICINE CLINIC | Age: 40
End: 2020-03-20

## 2020-03-20 LAB
ATRIAL RATE: 80 BPM
CALCULATED P AXIS, ECG09: 53 DEGREES
CALCULATED R AXIS, ECG10: -10 DEGREES
CALCULATED T AXIS, ECG11: 26 DEGREES
DIAGNOSIS, 93000: NORMAL
P-R INTERVAL, ECG05: 176 MS
Q-T INTERVAL, ECG07: 382 MS
QRS DURATION, ECG06: 80 MS
QTC CALCULATION (BEZET), ECG08: 440 MS
VENTRICULAR RATE, ECG03: 80 BPM

## 2020-03-20 NOTE — TELEPHONE ENCOUNTER
2:00 PM    I called [facility] to touched base with them with regards to their routine/non-urgent scheduled visit. I asked if there are any pertinent issues or medication refills that were needed. Called patient. She was seen in ER yesterday for cough/ bronchitis and costochondritis. States she was told by work she needs testing to return. Told her she could call health department or COVID hotline number to see if she qualifies but discussed she likely does not meet criteria at this time for testing but should self quarantine for 14 days. Discussed she could call Health Department to see if they say otherwise. Discussed if her sx acutely worsen to call us or go to ER. Recommend we cancel her CPE 3/24 which she is agreeable with. Will reschedule in 1 month. Again discussed to call us if she has worsening sx or any acute concerns. Patient understands that this encounter was a temporary measure, and the importance of further follow up and examination was emphasized. Patient verbalized understanding. Please cancel patient's appt at this time. Please make tickler for 1 month follow up.      Lottie Paulino, DO

## 2020-03-22 ENCOUNTER — TELEPHONE (OUTPATIENT)
Dept: FAMILY MEDICINE CLINIC | Age: 40
End: 2020-03-22

## 2020-03-23 ENCOUNTER — TELEPHONE (OUTPATIENT)
Dept: FAMILY MEDICINE CLINIC | Age: 40
End: 2020-03-23

## 2020-03-23 NOTE — TELEPHONE ENCOUNTER
Returned call regarding chest pressure and dizziness. Pt was evaluated in ED 3/19-nml EKG and CXR. Pt has started Naproxen, lidocaine patch and prednisone with little improvement. Discussed risk/benefits of additional exposure in ED and recent benign workup. If pt not feeling better, will present to ED in am. Discussed need to self-quarantine.       Shayan Mello MD

## 2020-03-23 NOTE — TELEPHONE ENCOUNTER
Patient asking that Dr. Denny Notice send her e-mail to my-chart in regards to her being off from work to self quarantine for 14 days. She states this is for her employer.     Pt TD/[phone]

## 2020-03-23 NOTE — TELEPHONE ENCOUNTER
appt scheduled for 3.23.2020 canceled    appt tickler created for: CPE/wellness to RSC/re-eval appt per COVID scheduling protocol as  spoke with patient on 3.20.2020    Close enc

## 2020-03-24 ENCOUNTER — PATIENT MESSAGE (OUTPATIENT)
Dept: FAMILY MEDICINE CLINIC | Age: 40
End: 2020-03-24

## 2020-03-31 ENCOUNTER — TELEPHONE (OUTPATIENT)
Dept: FAMILY MEDICINE CLINIC | Age: 40
End: 2020-03-31

## 2020-03-31 NOTE — TELEPHONE ENCOUNTER
Last seen at Tuesday, March 10, 2020 01:45 PM 4 [facility]   chest discomfort/cough/bronchitis not getting better    Patient is stating she needs a letter stating when she can go back to work    Please advise thanks

## 2020-04-03 NOTE — TELEPHONE ENCOUNTER
Called patient. Has been over 2 weeks since last seen for URI sx. Has been afebrile this whole time. No longer having any sx >72 hours. States Pulm says she has seasonal allergies. Per [facility] guidelines can return to work.     Mars Aguirre, DO

## 2020-08-06 ENCOUNTER — TELEPHONE (OUTPATIENT)
Dept: FAMILY MEDICINE CLINIC | Age: 40
End: 2020-08-06

## 2020-08-06 NOTE — TELEPHONE ENCOUNTER
----- Message from Hanna Hernandez sent at 8/6/2020  9:32 AM EDT -----  Regarding: Dr. Mendoza Mittal / telephone  Contact: [phone]  Caller's first and last name: n/a  Reason for call: Pt. would like to speak with Dr. Diana Bray RN. Pt. is inquiring about her options for her back.    Callback required yes/no and why: yes   Best contact number(s): [phone]  Details to clarify the request: n/a

## 2020-08-10 ENCOUNTER — VIRTUAL VISIT (OUTPATIENT)
Dept: FAMILY MEDICINE CLINIC | Age: 40
End: 2020-08-10
Payer: MEDICAID

## 2020-08-10 DIAGNOSIS — M54.9 BACK PAIN, UNSPECIFIED BACK LOCATION, UNSPECIFIED BACK PAIN LATERALITY, UNSPECIFIED CHRONICITY: ICD-10-CM

## 2020-08-10 DIAGNOSIS — M41.9 SCOLIOSIS, UNSPECIFIED SCOLIOSIS TYPE, UNSPECIFIED SPINAL REGION: Primary | ICD-10-CM

## 2020-08-10 PROCEDURE — 99214 OFFICE O/P EST MOD 30 MIN: CPT | Performed by: STUDENT IN AN ORGANIZED HEALTH CARE EDUCATION/TRAINING PROGRAM

## 2020-08-10 NOTE — PROGRESS NOTES
[address] Medicine Residency Attending Addendum:  Dr. Steven Chang MD,  the patient and I were not physically present during this encounter. The resident and I are concurrently monitoring the patient care through appropriate telecommunication technology. I discussed the findings, assessment and plan with the resident and agree with the resident's findings and plan as documented in the resident's note.       Modesta Plaza MD

## 2020-08-10 NOTE — PROGRESS NOTES
Elias Lee  44 y.o. female  1980  [address] [facility] [id number]  [phone]   [address]:    Telemedicine Progress Note  Daphney Kim MD       Encounter Date and Time: August 10, 2020 at 11:07 AM    Consent: Elias Lee, who was seen by synchronous (real-time) audio-video technology, and/or her healthcare decision maker, is aware that this patient-initiated, Telehealth encounter on 8/10/2020 is a billable service, with coverage as determined by her insurance carrier. She is aware that she may receive a bill and has provided verbal consent to proceed: Yes. Chief Complaint   Patient presents with    Back Pain     History of Present Illness   Barakemily MARGARET Tete Oneill is a 44 y.o. female was evaluated by synchronous (real-time) audio-video technology from home, through a secure patient portal.     Patient has a history of scoliosis, hx of lyme Uveitis disease, fibromyalgia, Obesity  Patient states that for the past twenty years has been suffering from back pain secondary to her scoliosis. States she has been evaluated by Colton Adair  (unable to remember doctor's name) in the past and was recommended back surgery. She refused surgery due to risk of becoming paraplegic. Her pain has been well controlled in the past with PT, acupuncture, CBD cream, tylenol and trigger points. States she has not been to physical therapy in over a year. For the past month or so back pain has been progressively getting worse. Describes pain as pressure/compression, pinching type pain, 7-8/10 intensity, aggravated with physical activity and relieved with resting. Patient denies any focal neurological deficits, incontinence, saddle anesthesia or fever. Patient states she has not been able to perform aerobic exercises due to allergies. Has been trying to keep a low carb los sodium diet.  Of note patient also follows up outpatient with Dr. Jerry Padgett Neurologist.     Patient Screening for COVID-19:     1) Patient denies complaints of shortness of breath or difficulty breathing, sore throat,  chills, fatigue, muscle aches, loss of taste or smell, or GI symptoms(nausea, vomiting or diarrhea): No    2) Patient denies complaints of cough or fever over 100F: No    3) Patient denies leaving the country in the past 14 days or any other recent travel: No    4) Patient denies being exposed to anyone with COVID-19 and has not been around any one that has been sick with COVID-19 like symptoms as listed above: No     5) Patient informed to wear mask when arriving for appointment: No      Review of Systems   Review of Systems   Constitutional: Negative for chills and fever. HENT: Negative for congestion and sore throat. Eyes: Negative for blurred vision and double vision. Chronic R eye vision loss   Respiratory: Negative for cough, hemoptysis, sputum production, shortness of breath and wheezing. Cardiovascular: Negative for chest pain, palpitations and leg swelling. Gastrointestinal: Negative for abdominal pain, blood in stool, constipation, diarrhea, heartburn, melena, nausea and vomiting. Genitourinary: Negative for dysuria and urgency. Musculoskeletal: Positive for back pain. Negative for joint pain. Skin: Negative for rash. Neurological: Negative for dizziness, focal weakness and headaches. Psychiatric/Behavioral: Negative for suicidal ideas. Vitals/Objective:     General: alert, cooperative, no distress   Mental  status: mental status: alert, oriented to person, place, and time, normal mood, behavior, speech, dress, motor activity, and thought processes   Resp: resp: normal effort and no respiratory distress   Neuro: neuro: no gross deficits   Skin: skin: no discoloration or lesions of concern on visible areas   Due to this being a TeleHealth evaluation, many elements of the physical examination are unable to be assessed.       Assessment and Plan:   Time-based coding, delete if not needed: I spent at least 25 minutes with this established patient, and >50% of the time was spent counseling and/or coordinating care regarding back pain    1. Acute on chronic back pain secondary to scoliosis: Patient with back pain for the past twenty years however worse in the past month. Has previously gone to PT and received injections which relieved her pain. - Patient was advised to continue taking tylenol extra strength q6h PRN, however not exceeding 3g in 24 hours. - Patient is to work with PT and follow up with Sports Medicine in four weeks. - REFERRAL TO PHYSICAL THERAPY  - REFERRAL TO SPORTS MEDICINE      Time spent in direct conversation with the patient to include medical condition(s) discussed, assessment and treatment plan:       We discussed the expected course, resolution and complications of the diagnosis(es) in detail. Medication risks, benefits, costs, interactions, and alternatives were discussed as indicated. I advised her to contact the office if her condition worsens, changes or fails to improve as anticipated. She expressed understanding with the diagnosis(es) and plan. Patient understands that this encounter was a temporary measure, and the importance of further follow up and examination was emphasized. Patient verbalized understanding. Patient informed to follow up: in four weeks with Sports Medicine. .  Follow-up and Dispositions  ·   Return in about 4 weeks (around 9/7/2020) for Back pain. Electronically Signed: Julia Nunez MD    CPT Codes 47386-84547 for Established Patients may apply to this Telehealth Visit. POS code: 18. Karen Hoff is a 44 y.o. female who was evaluated by an audio-video encounter for concerns as above. Patient identification was verified prior to start of the visit. A caregiver was present when appropriate.  Due to this being a TeleHealth encounter (During IWIDJ-66 public health emergency), evaluation of the following organ systems was limited: Vitals/Constitutional/EENT/Resp/CV/GI//MS/Neuro/Skin/Heme-Lymph-Imm. Pursuant to the emergency declaration under the [facility] waiver authority and the [employer] Act, this Virtual Visit was conducted, with patient's (and/or legal guardian's) consent, to reduce the patient's risk of exposure to COVID-19 and provide necessary medical care. Services were provided through a synchronous discussion virtually to substitute for in-person clinic visit. I was at home. The patient was at home. History   Patients past medical, surgical and family histories were reviewed and updated.       Past Medical History:   Diagnosis Date    Anxiety     Arthritis     Depression     Headache     Ill-defined condition     scoliosis, gall stones, anemia    Lyme disease     Scoliosis     Seizures ([facility] [address]      Past Surgical History:   Procedure Laterality Date    HX  SECTION      x 4    HX CHOLECYSTECTOMY  2017    HX HERNIA REPAIR      HX OTHER SURGICAL      PIC line placed for Lymes     HX TUBAL LIGATION       Family History   Problem Relation Age of Onset    Arthritis-rheumatoid Mother     Depression Mother     Depression Father     Diabetes Father     Cancer Maternal Aunt     Cancer Maternal Uncle     Cancer Paternal Aunt     Cancer Paternal Uncle     Heart Attack Maternal Grandmother     Cancer Maternal Grandfather      Social History     Socioeconomic History    Marital status: SINGLE     Spouse name: Not on file    Number of children: Not on file    Years of education: Not on file    Highest education level: Not on file   Occupational History    Not on file   Social Needs    Financial resource strain: Not on file    Food insecurity     Worry: Not on file     Inability: Not on file    Transportation needs     Medical: Not on file Non-medical: Not on file   Tobacco Use    Smoking status: Never Smoker    Smokeless tobacco: Never Used   Substance and Sexual Activity    Alcohol use: No    Drug use: No    Sexual activity: Not Currently   Lifestyle    Physical activity     Days per week: Not on file     Minutes per session: Not on file    Stress: Not on file   Relationships    Social connections     Talks on phone: Not on file     Gets together: Not on file     Attends Voodoo service: Not on file     Active member of club or organization: Not on file     Attends meetings of clubs or organizations: Not on file     Relationship status: Not on file    Intimate partner violence     Fear of current or ex partner: Not on file     Emotionally abused: Not on file     Physically abused: Not on file     Forced sexual activity: Not on file   Other Topics Concern    Not on file   Social History Narrative    Not on file     Patient Active Problem List   Diagnosis Code    Severe obesity ([facility] [address] E66.01    Intractable migraine with aura with status migrainosus G43. 111    Chronic anterior uveitis of right eye H20.11    Uveitis H20.9    Fibromyalgia M79.7    Scoliosis M41.9          Current Medications/Allergies   Medications and Allergies reviewed:    Current Outpatient Medications   Medication Sig Dispense Refill    lidocaine (Lidoderm) 5 % 3 Patches by TransDERmal route every twenty-four (24) hours. Apply patch to the affected area for 12 hours a day and remove for 12 hours a day. 30 Each 0    methocarbamoL (Robaxin-750) 750 mg tablet Take 1 Tab by mouth four (4) times daily. As needed for muscle spasm  Indications: muscle spasm 20 Tab 0    predniSONE (DELTASONE) 10 mg tablet Take with breakfast daily on a taper: 6 tabs PO for 3 days then 4 tabs PO for 3 days then 2 tabs PO for 3 days then 1 tab PO for 3 days 39 Tab 0    milnacipran (SAVELLA) 25 mg tablet Take  by mouth.       nortriptyline (PAMELOR) 25 mg capsule Take  by mouth nightly.  promethazine-codeine (PHENERGAN WITH CODEINE) 6.25-10 mg/5 mL syrup Take  by mouth four (4) times daily as needed for Cough.  benzonatate (TESSALON) 100 mg capsule Take 100 mg by mouth three (3) times daily as needed for Cough.  DULoxetine (CYMBALTA) 30 mg capsule Take 1 Cap by mouth daily. 90 Cap 1    albuterol (PROVENTIL VENTOLIN) 2.5 mg /3 mL (0.083 %) nebulizer solution by Nebulization route every four (4) hours as needed.        Allergies   Allergen Reactions    Other Food Swelling     All nuts except peanuts    Morphine Rash     Rash on back and legs    Tramadol Nausea and Vomiting    Doxycycline Nausea Only    Nut - Unspecified Swelling    Burnside Swelling     Swollen lips

## 2020-08-18 ENCOUNTER — APPOINTMENT (OUTPATIENT)
Dept: PHYSICAL THERAPY | Age: 40
End: 2020-08-18

## 2020-11-06 ENCOUNTER — HOSPITAL ENCOUNTER (EMERGENCY)
Age: 40
Discharge: HOME | End: 2020-11-06
Attending: EMERGENCY MEDICINE | Admitting: EMERGENCY MEDICINE
Payer: MEDICAID

## 2020-11-06 ENCOUNTER — APPOINTMENT (OUTPATIENT)
Dept: GENERAL RADIOLOGY | Age: 40
End: 2020-11-06
Attending: NURSE PRACTITIONER
Payer: MEDICAID

## 2020-11-06 VITALS
WEIGHT: 241 LBS | SYSTOLIC BLOOD PRESSURE: 121 MMHG | RESPIRATION RATE: 20 BRPM | BODY MASS INDEX: 37.83 KG/M2 | TEMPERATURE: 98.5 F | DIASTOLIC BLOOD PRESSURE: 69 MMHG | OXYGEN SATURATION: 97 % | HEART RATE: 61 BPM | HEIGHT: 67 IN

## 2020-11-06 DIAGNOSIS — I87.8 VENOUS STASIS: Primary | ICD-10-CM

## 2020-11-06 LAB
ALBUMIN SERPL-MCNC: 3.4 G/DL (ref 3.5–5)
ALBUMIN/GLOB SERPL: 0.8 {RATIO} (ref 1.1–2.2)
ALP SERPL-CCNC: 72 U/L (ref 45–117)
ALT SERPL-CCNC: 26 U/L (ref 12–78)
ANION GAP SERPL CALC-SCNC: 6 MMOL/L (ref 5–15)
AST SERPL-CCNC: 36 U/L (ref 15–37)
BASOPHILS # BLD: 0 K/UL (ref 0–0.1)
BASOPHILS NFR BLD: 0 % (ref 0–1)
BILIRUB SERPL-MCNC: 0.3 MG/DL (ref 0.2–1)
BNP SERPL-MCNC: 74 PG/ML
BUN SERPL-MCNC: 15 MG/DL (ref 6–20)
BUN/CREAT SERPL: 15 (ref 12–20)
CALCIUM SERPL-MCNC: 8.9 MG/DL (ref 8.5–10.1)
CHLORIDE SERPL-SCNC: 108 MMOL/L (ref 97–108)
CO2 SERPL-SCNC: 24 MMOL/L (ref 21–32)
COMMENT, HOLDF: NORMAL
CREAT SERPL-MCNC: 0.97 MG/DL (ref 0.55–1.02)
DIFFERENTIAL METHOD BLD: ABNORMAL
EOSINOPHIL # BLD: 0.2 K/UL (ref 0–0.4)
EOSINOPHIL NFR BLD: 3 % (ref 0–7)
ERYTHROCYTE [DISTWIDTH] IN BLOOD BY AUTOMATED COUNT: 13.8 % (ref 11.5–14.5)
GLOBULIN SER CALC-MCNC: 4.5 G/DL (ref 2–4)
GLUCOSE SERPL-MCNC: 94 MG/DL (ref 65–100)
HCT VFR BLD AUTO: 36.1 % (ref 35–47)
HGB BLD-MCNC: 11.4 G/DL (ref 11.5–16)
IMM GRANULOCYTES # BLD AUTO: 0 K/UL (ref 0–0.04)
IMM GRANULOCYTES NFR BLD AUTO: 0 % (ref 0–0.5)
LYMPHOCYTES # BLD: 2.1 K/UL (ref 0.8–3.5)
LYMPHOCYTES NFR BLD: 31 % (ref 12–49)
MCH RBC QN AUTO: 28.1 PG (ref 26–34)
MCHC RBC AUTO-ENTMCNC: 31.6 G/DL (ref 30–36.5)
MCV RBC AUTO: 89.1 FL (ref 80–99)
MONOCYTES # BLD: 0.4 K/UL (ref 0–1)
MONOCYTES NFR BLD: 6 % (ref 5–13)
NEUTS SEG # BLD: 4.2 K/UL (ref 1.8–8)
NEUTS SEG NFR BLD: 60 % (ref 32–75)
NRBC # BLD: 0 K/UL (ref 0–0.01)
NRBC BLD-RTO: 0 PER 100 WBC
PLATELET # BLD AUTO: 158 K/UL (ref 150–400)
POTASSIUM SERPL-SCNC: 4.3 MMOL/L (ref 3.5–5.1)
PROT SERPL-MCNC: 7.9 G/DL (ref 6.4–8.2)
RBC # BLD AUTO: 4.05 M/UL (ref 3.8–5.2)
RBC MORPH BLD: ABNORMAL
SAMPLES BEING HELD,HOLD: NORMAL
SODIUM SERPL-SCNC: 138 MMOL/L (ref 136–145)
WBC # BLD AUTO: 6.9 K/UL (ref 3.6–11)

## 2020-11-06 PROCEDURE — 72170 X-RAY EXAM OF PELVIS: CPT

## 2020-11-06 PROCEDURE — 71046 X-RAY EXAM CHEST 2 VIEWS: CPT

## 2020-11-06 PROCEDURE — 80053 COMPREHEN METABOLIC PANEL: CPT

## 2020-11-06 PROCEDURE — 99282 EMERGENCY DEPT VISIT SF MDM: CPT

## 2020-11-06 PROCEDURE — 85025 COMPLETE CBC W/AUTO DIFF WBC: CPT

## 2020-11-06 PROCEDURE — 83880 ASSAY OF NATRIURETIC PEPTIDE: CPT

## 2020-11-06 PROCEDURE — 36415 COLL VENOUS BLD VENIPUNCTURE: CPT

## 2020-11-06 NOTE — ED PROVIDER NOTES
This is a 70-year-old well-appearing female with past medical history including anxiety/depression, fibromyalgia, arthritis, scoliosis, obesity, headaches, unspecified anemia who presents the ER today for several subacute somatic complaints. According to patient, starting approximately 2 weeks ago she developed insidious onset and atraumatic bilateral lower extremity pain (R>L) which has continued to progress and has not improved with taking her prescribed pain regimen. The pain seems to extend from bilateral kneecaps down into the feet, which is also associated with a sensation of hypoesthesia to her right lower extremity. Aggravating factors include changing of position and ambulation. No alleviating factors. Constitutional symptoms include mild dyspnea with exertion, orthopnea, abnormal gait, and a sensation of bilateral leg swelling. She denies any history of similar symptoms. She denies any recent prescription or over-the-counter medication changes. She denies any cardiac diagnoses.     ROS negative for: Syncope, chest pain, hip pain, vomiting, diaphoresis, fever/chills, urinary complaints           Past Medical History:   Diagnosis Date    Anxiety     Arthritis     Depression     Headache     Ill-defined condition     scoliosis, gall stones, anemia    Lyme disease     Scoliosis     Seizures ([address]        Past Surgical History:   Procedure Laterality Date    HX  SECTION      x 4    HX CHOLECYSTECTOMY  2017    HX HERNIA REPAIR      HX OTHER SURGICAL      PIC line placed for Lymes     HX TUBAL LIGATION           Family History:   Problem Relation Age of Onset   Bender Arthritis-rheumatoid Mother     Depression Mother     Depression Father     Diabetes Father     Cancer Maternal Aunt     Cancer Maternal Uncle     Cancer Paternal Aunt     Cancer Paternal Uncle     Heart Attack Maternal Grandmother     Cancer Maternal Grandfather        Social History     Socioeconomic History    Marital status: SINGLE     Spouse name: Not on file    Number of children: Not on file    Years of education: Not on file    Highest education level: Not on file   Occupational History    Not on file   Social Needs    Financial resource strain: Not on file    Food insecurity     Worry: Not on file     Inability: Not on file    Transportation needs     Medical: Not on file     Non-medical: Not on file   Tobacco Use    Smoking status: Never Smoker    Smokeless tobacco: Never Used   Substance and Sexual Activity    Alcohol use: No    Drug use: No    Sexual activity: Not Currently   Lifestyle    Physical activity     Days per week: Not on file     Minutes per session: Not on file    Stress: Not on file   Relationships    Social connections     Talks on phone: Not on file     Gets together: Not on file     Attends Cheondoism service: Not on file     Active member of club or organization: Not on file     Attends meetings of clubs or organizations: Not on file     Relationship status: Not on file    Intimate partner violence     Fear of current or ex partner: Not on file     Emotionally abused: Not on file     Physically abused: Not on file     Forced sexual activity: Not on file   Other Topics Concern    Not on file   Social History Narrative    Not on file         ALLERGIES: Other food; Morphine; Tramadol; Doxycycline; Nut - unspecified; and [address]    Review of Systems   Constitutional: Negative for chills and fever. HENT: Negative for sore throat. Eyes: Negative for visual disturbance. Respiratory: Positive for shortness of breath. Negative for cough. Cardiovascular: Positive for leg swelling. Negative for chest pain and palpitations. Gastrointestinal: Negative for abdominal pain and vomiting. Genitourinary: Negative for dysuria and frequency. Musculoskeletal: Positive for arthralgias, gait problem and myalgias. Negative for back pain. Skin: Negative for color change and rash. Neurological: Negative for dizziness. Vitals:    20 0920   BP: 121/69   Pulse: 61   Resp: 20   Temp: 98.5 °F (36.9 °C)   SpO2: 97%   Weight: 109.3 kg (241 lb)   Height: 5' 6.5\" (1.689 m)            Physical Exam  Vitals signs and nursing note reviewed. Constitutional:       Appearance: She is obese. She is not ill-appearing. HENT:      Head: Normocephalic and atraumatic. Right Ear: External ear normal.      Left Ear: External ear normal.      Nose: Nose normal.      Mouth/Throat:      Mouth: Mucous membranes are moist.      Pharynx: Oropharynx is clear. Eyes:      General: No scleral icterus. Extraocular Movements: Extraocular movements intact. Conjunctiva/sclera: Conjunctivae normal.      Pupils: Pupils are equal, round, and reactive to light. Neck:      Musculoskeletal: Normal range of motion and neck supple. No neck rigidity or muscular tenderness. Cardiovascular:      Rate and Rhythm: Normal rate and regular rhythm. Pulses: Normal pulses. Radial pulses are 2+ on the right side and 2+ on the left side. Posterior tibial pulses are 2+ on the right side and 2+ on the left side. Heart sounds: Normal heart sounds. No murmur. No S3 sounds. Pulmonary:      Effort: Pulmonary effort is normal. No accessory muscle usage or respiratory distress. Breath sounds: Normal breath sounds and air entry. Abdominal:      General: Abdomen is flat. Bowel sounds are normal.      Palpations: Abdomen is soft. Musculoskeletal: Normal range of motion. Right lower le+ Edema present. Left lower le+ Edema present. Comments: Diffuse cutaneous allodynia on bilateral lower extremities. Able to detect light touch on both lower extremities. Skin:     General: Skin is warm and dry. Coloration: Skin is not pale. Comments: Skin warm and well-perfused in all 4 extremities   Neurological:      General: No focal deficit present.       Mental Status: She is alert and oriented to person, place, and time. Coordination: Coordination is intact. Gait: Gait abnormal.   Psychiatric:         Mood and Affect: Mood normal.         Behavior: Behavior normal.         Thought Content: Thought content normal.         Judgment: Judgment normal.          [facility]      VITAL SIGNS:  Patient Vitals for the past 4 hrs:   Temp Pulse Resp BP SpO2   11/06/20 0920 98.5 °F (36.9 °C) 61 20 121/69 97 %         LABS:  No results found for this or any previous visit (from the past 6 hour(s)). IMAGING:  XR CHEST PA LAT   Final Result   Impression: No acute process or change compared to the prior exam.         XR PELV AP ONLY   Final Result   IMPRESSION: no acute process. Medications During Visit:  Medications - No data to display      DECISION MAKING:  Vance Smith is a 44 y.o. female who comes in as above. X-ray unremarkable for acute process in the chest (no cardiomegaly) and pelvis. Lab tests unremarkable. Very mild and stable hypoalbuminemia. Normal serum protein. Normal proBNP. No clear-cut explanation for mild lower extremity edema. PERC negative and no evidence of DVT on exam.    Symptoms are consistent with venous stasis disease. Patient states that she used to wear compression stockings, but stopped using them several years ago because of irritating adjacent varicosities. She is encouraged to continue with supportive measures like exercise as tolerated and leg elevation while resting and to follow-up with her primary care team and pain management team.    Above results discussed and reviewed with the patient. Patient verbalized understanding of the care plan, including any changes to current outpatient medication regimen, discussed disease process, symptom control, and follow-up care. IMPRESSION:  1.  Venous stasis        DISPOSITION:  Discharged      Current Discharge Medication List           Follow-up Information     Follow up With Specialties Details Why [address] [facility], Kay [place], [address] [place] [address]  [phone]              The patient is asked to follow-up with their primary care provider in the next several days. They are to call tomorrow for an appointment. The patient is asked to return promptly for any increased concerns or worsening of symptoms. They can return to this emergency department or any other emergency department.

## 2020-11-06 NOTE — ED TRIAGE NOTES
Pt ambulatory to ED with c/o bilateral lower extremity edema and pain (R>L) onset 2 weeks ago. Pt reports SOB with exertion \"but it's not something new because I have asthma and am on inhalers\". Denies injury but reports \"standing long periods on job\".

## 2020-11-19 ENCOUNTER — HOSPITAL ENCOUNTER (OUTPATIENT)
Dept: PHYSICAL THERAPY | Age: 40
Discharge: HOME | End: 2020-11-19
Payer: MEDICAID

## 2020-11-19 PROCEDURE — 97162 PT EVAL MOD COMPLEX 30 MIN: CPT | Performed by: PHYSICAL THERAPIST

## 2020-11-19 NOTE — PROGRESS NOTES
Physical Therapy at [facility],   a part of  [facility]  [facility] 1923 [facility], [address] [place] [address] Drive  Phone: [phone]  Fax: [phone]    Plan of Care/Statement of Necessity for Physical Therapy Services  2-15    Patient name: Lanette Larsen  : 1980  Provider#: [id number]  Referral source: Kalen Monge MD      Medical/Treatment Diagnosis: Low back pain [M54.5]     Prior Hospitalization: see medical history     Comorbidities: Scoliosis   Prior Level of Function: see initial eval  Medications: Verified on Patient Summary List  Start of Care: 2020      Onset Date: Several months ago   The Plan of Care and following information is based on the information from the initial evaluation. Assessment/ key information: Patient presents with B LE pain, R>L, and chronic low back pain due to scoliosis. The LE pain has seen a dramatic increase over the past several months due to repetitive standing and lifting while at work. Evaluation Complexity History MEDIUM  Complexity : 1-2 comorbidities / personal factors will impact the outcome/ POC ; Examination MEDIUM Complexity : 3 Standardized tests and measures addressing body structure, function, activity limitation and / or participation in recreation  ;Presentation MEDIUM Complexity : Evolving with changing characteristics  ; Clinical Decision Making MEDIUM Complexity : FOTO score of 26-74  Overall Complexity Rating: MEDIUM    Problem List: pain affecting function, decrease ROM, decrease strength, impaired gait/ balance, decrease ADL/ functional abilitiies, decrease activity tolerance, decrease flexibility/ joint mobility and decrease transfer abilities   Treatment Plan may include any combination of the following: Therapeutic exercise, Therapeutic activities, Neuromuscular re-education, Physical agent/modality, Gait/balance training, Manual therapy, Patient education, Self Care training, Functional mobility training, Home safety training and Stair training  Patient / Family readiness to learn indicated by: asking questions, trying to perform skills and interest  Persons(s) to be included in education: patient (P)  Barriers to Learning/Limitations: None  Patient Goal (s): I want to be able to work with less pain.   Patient Self Reported Health Status: Good  Rehabilitation Potential: Good    Short Term Goals: To be accomplished in 4 weeks:  1. Patient will be able to stand for 10 minutes with <5/10 LE pain reported. 2. Patient will be able to walk two blocks with <5/10 LE pain reported. 3. Patient will be able bend forward and lift 25# from the floor with <5/10 back pain reported. Long Term Goals: To be accomplished in 12 weeks:  1. Patient will be able to stand for 30 minutes with <3/10 LE pain. 2. Patient will be able to walk 1/4 mile with <3/10 LE pain. 3. Patient will be able to bend forward and lift 50# from the floor with <3/10 back pain. Frequency / Duration: Patient to be seen 1-2 times per week for 12 weeks. Patient/ Caregiver education and instruction: self care, activity modification and exercises    [x]  Plan of care has been reviewed with PTA        Certification Period: 11/19/2020 - 2/19/2020    Kellen Wiley, PT , DPT, OCS, Cert. DN   11/19/2020     ________________________________________________________________________    I certify that the above Therapy Services are being furnished while the patient is under my care. I agree with the treatment plan and certify that this therapy is necessary.     [de-identified] Signature:____________________  Date:____________Time: _________

## 2020-11-19 NOTE — PROGRESS NOTES
PT INITIAL EVALUATION NOTE 2-15    Patient Name: Isabelle Marlow  Date:2020  : 1980  [x]  Patient  Verified  Payor: BLUE CROSS MEDICAID / Plan: [facility] [employer]US / Product Type: Managed Care Medicaid /    In time:10:20 AM  Out time:11:10 AM  Total Treatment Time (min): 50  Visit #: 1     Treatment Area: Low back pain [M54.5]    SUBJECTIVE  Pain Level (0-10 scale): 4  Any medication changes, allergies to medications, adverse drug reactions, diagnosis change, or new procedure performed?: [] No    [x] Yes (see summary sheet for update)  Subjective:     Scoliosis, chronic low back pain, B LE pain  PLOF: Not quite a limited with walking and standing. Mechanism of Injury: Gradual worsening of B leg pain, R>L. Made worse with work tasks that involve repetitive lifting, prolonged walking/standing. Previous Treatment/Compliance: She has had previous PT before with good success reported with dry needling and moderate success with more traditional interventions, like ther ex and modalities.   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#  Living Situation: lives in a two story home with family  Pt Goals: to reduce pain and with work  Barriers: chronicity, severity  Motivation: motivated  Substance use: none  FABQ Score: none  Cognition: A & O x 4       OBJECTIVE/EXAMINATION  Myokinematic Restoration           R   L  Add Drop Test     +   +  Extension Drop Test  Trunk Rotation  Straight Leg Raise    30   45  FA IR ROM  FA ER ROM  FA ER Strength  FA IR Strength  HAddLT  Standing Reach Test     Lacking 12\"    Pelvis Restoration     Pelvic Thomas Drop Test  Passive Abduction Raise Test  Posterior Mediastinum Expansion    Postural Respiration    Apical Expansion  Horizontal Abduction     0   -20  Shoulder Flexion    150   140  HG IR      60    80  Lower Rib Flare    Slump test: Positive    Modality rationale: decrease pain and increase tissue extensibility to improve the patients ability to walk without pain   Min Type Additional Details    [] Estim: []Att   []Unatt        []TENS instruct                  []IFC  []Premod   []NMES                     []Other:  []w/US   []w/ice   []w/heat  Position:  Location:    []  Traction: [] Cervical       []Lumbar                       [] Prone          []Supine                       []Intermittent   []Continuous Lbs:  [] before manual  [] after manual  []w/heat    []  Ultrasound: []Continuous   [] Pulsed at:                            []1MHz   []3MHz Location:  W/cm2:    []  Paraffin         Location:  []w/heat   10 []  Ice     [x]  Heat  []  Ice massage Position: seated  Location: low back/anterior thighs    []  Laser  []  Other: Position:  Location:    []  Vasopneumatic Device Pressure:       [] lo [] med [] hi   Temperature:    [x] Skin assessment post-treatment:  [x]intact []redness- no adverse reaction    []redness - adverse reaction:             With   [] TE   [] TA   [] neuro   [] other: Patient Education: [x] Review HEP    [] Progressed/Changed HEP based on:   [] positioning   [] body mechanics   [] transfers   [] heat/ice application    [] other:        Other Objective/Functional Measures:      Pain Level (0-10 scale) post treatment: 5      ASSESSMENT:      [x]  See Plan of José Hernandez, PT 11/19/2020

## 2020-12-04 ENCOUNTER — APPOINTMENT (OUTPATIENT)
Dept: PHYSICAL THERAPY | Age: 40
End: 2020-12-04

## 2020-12-07 ENCOUNTER — APPOINTMENT (OUTPATIENT)
Dept: PHYSICAL THERAPY | Age: 40
End: 2020-12-07

## 2020-12-09 ENCOUNTER — APPOINTMENT (OUTPATIENT)
Dept: PHYSICAL THERAPY | Age: 40
End: 2020-12-09

## 2020-12-14 ENCOUNTER — APPOINTMENT (OUTPATIENT)
Dept: PHYSICAL THERAPY | Age: 40
End: 2020-12-14

## 2020-12-17 ENCOUNTER — APPOINTMENT (OUTPATIENT)
Dept: PHYSICAL THERAPY | Age: 40
End: 2020-12-17

## 2020-12-21 ENCOUNTER — APPOINTMENT (OUTPATIENT)
Dept: PHYSICAL THERAPY | Age: 40
End: 2020-12-21

## 2020-12-24 ENCOUNTER — APPOINTMENT (OUTPATIENT)
Dept: PHYSICAL THERAPY | Age: 40
End: 2020-12-24

## 2021-01-02 ENCOUNTER — TELEPHONE (OUTPATIENT)
Dept: FAMILY MEDICINE CLINIC | Age: 41
End: 2021-01-02

## 2021-01-03 ENCOUNTER — HOSPITAL ENCOUNTER (EMERGENCY)
Age: 41
Discharge: HOME | End: 2021-01-03
Attending: EMERGENCY MEDICINE
Payer: MEDICAID

## 2021-01-03 VITALS
HEART RATE: 59 BPM | SYSTOLIC BLOOD PRESSURE: 139 MMHG | HEIGHT: 66 IN | RESPIRATION RATE: 16 BRPM | DIASTOLIC BLOOD PRESSURE: 86 MMHG | TEMPERATURE: 98.7 F | OXYGEN SATURATION: 100 % | WEIGHT: 250 LBS | BODY MASS INDEX: 40.18 KG/M2

## 2021-01-03 DIAGNOSIS — R51.9 INTRACTABLE HEADACHE, UNSPECIFIED CHRONICITY PATTERN, UNSPECIFIED HEADACHE TYPE: Primary | ICD-10-CM

## 2021-01-03 LAB
ANION GAP SERPL CALC-SCNC: 4 MMOL/L (ref 5–15)
BASOPHILS # BLD: 0 K/UL (ref 0–0.1)
BASOPHILS NFR BLD: 1 % (ref 0–1)
BUN SERPL-MCNC: 14 MG/DL (ref 6–20)
BUN/CREAT SERPL: 14 (ref 12–20)
CALCIUM SERPL-MCNC: 9.1 MG/DL (ref 8.5–10.1)
CHLORIDE SERPL-SCNC: 109 MMOL/L (ref 97–108)
CO2 SERPL-SCNC: 27 MMOL/L (ref 21–32)
COMMENT, HOLDF: NORMAL
CREAT SERPL-MCNC: 1.03 MG/DL (ref 0.55–1.02)
DIFFERENTIAL METHOD BLD: NORMAL
EOSINOPHIL # BLD: 0.1 K/UL (ref 0–0.4)
EOSINOPHIL NFR BLD: 1 % (ref 0–7)
ERYTHROCYTE [DISTWIDTH] IN BLOOD BY AUTOMATED COUNT: 13.8 % (ref 11.5–14.5)
GLUCOSE SERPL-MCNC: 77 MG/DL (ref 65–100)
HCT VFR BLD AUTO: 37.7 % (ref 35–47)
HGB BLD-MCNC: 11.9 G/DL (ref 11.5–16)
IMM GRANULOCYTES # BLD AUTO: 0 K/UL (ref 0–0.04)
IMM GRANULOCYTES NFR BLD AUTO: 0 % (ref 0–0.5)
LYMPHOCYTES # BLD: 2.3 K/UL (ref 0.8–3.5)
LYMPHOCYTES NFR BLD: 27 % (ref 12–49)
MCH RBC QN AUTO: 28 PG (ref 26–34)
MCHC RBC AUTO-ENTMCNC: 31.6 G/DL (ref 30–36.5)
MCV RBC AUTO: 88.7 FL (ref 80–99)
MONOCYTES # BLD: 0.4 K/UL (ref 0–1)
MONOCYTES NFR BLD: 5 % (ref 5–13)
NEUTS SEG # BLD: 5.6 K/UL (ref 1.8–8)
NEUTS SEG NFR BLD: 66 % (ref 32–75)
NRBC # BLD: 0 K/UL (ref 0–0.01)
NRBC BLD-RTO: 0 PER 100 WBC
PLATELET # BLD AUTO: 298 K/UL (ref 150–400)
PMV BLD AUTO: 10.2 FL (ref 8.9–12.9)
POTASSIUM SERPL-SCNC: 3.4 MMOL/L (ref 3.5–5.1)
RBC # BLD AUTO: 4.25 M/UL (ref 3.8–5.2)
SAMPLES BEING HELD,HOLD: NORMAL
SODIUM SERPL-SCNC: 140 MMOL/L (ref 136–145)
WBC # BLD AUTO: 8.4 K/UL (ref 3.6–11)

## 2021-01-03 PROCEDURE — 74011250636 HC RX REV CODE- 250/636: Performed by: NURSE PRACTITIONER

## 2021-01-03 PROCEDURE — 36415 COLL VENOUS BLD VENIPUNCTURE: CPT

## 2021-01-03 PROCEDURE — 96375 TX/PRO/DX INJ NEW DRUG ADDON: CPT

## 2021-01-03 PROCEDURE — 96361 HYDRATE IV INFUSION ADD-ON: CPT

## 2021-01-03 PROCEDURE — 96374 THER/PROPH/DIAG INJ IV PUSH: CPT

## 2021-01-03 PROCEDURE — 90791 PSYCH DIAGNOSTIC EVALUATION: CPT

## 2021-01-03 PROCEDURE — 85025 COMPLETE CBC W/AUTO DIFF WBC: CPT

## 2021-01-03 PROCEDURE — 96376 TX/PRO/DX INJ SAME DRUG ADON: CPT

## 2021-01-03 PROCEDURE — 80048 BASIC METABOLIC PNL TOTAL CA: CPT

## 2021-01-03 PROCEDURE — 74011250636 HC RX REV CODE- 250/636: Performed by: EMERGENCY MEDICINE

## 2021-01-03 PROCEDURE — 99282 EMERGENCY DEPT VISIT SF MDM: CPT

## 2021-01-03 RX ORDER — KETOROLAC TROMETHAMINE 30 MG/ML
30 INJECTION, SOLUTION INTRAMUSCULAR; INTRAVENOUS ONCE
Status: COMPLETED | OUTPATIENT
Start: 2021-01-03 | End: 2021-01-03

## 2021-01-03 RX ORDER — DIPHENHYDRAMINE HYDROCHLORIDE 50 MG/ML
25 INJECTION, SOLUTION INTRAMUSCULAR; INTRAVENOUS
Status: COMPLETED | OUTPATIENT
Start: 2021-01-03 | End: 2021-01-03

## 2021-01-03 RX ORDER — ONDANSETRON 2 MG/ML
4 INJECTION INTRAMUSCULAR; INTRAVENOUS
Status: COMPLETED | OUTPATIENT
Start: 2021-01-03 | End: 2021-01-03

## 2021-01-03 RX ORDER — DEXAMETHASONE SODIUM PHOSPHATE 10 MG/ML
10 INJECTION INTRAMUSCULAR; INTRAVENOUS
Status: COMPLETED | OUTPATIENT
Start: 2021-01-03 | End: 2021-01-03

## 2021-01-03 RX ADMIN — ONDANSETRON 4 MG: 2 INJECTION INTRAMUSCULAR; INTRAVENOUS at 16:05

## 2021-01-03 RX ADMIN — SODIUM CHLORIDE 1000 ML: 9 INJECTION, SOLUTION INTRAVENOUS at 16:05

## 2021-01-03 RX ADMIN — DEXAMETHASONE SODIUM PHOSPHATE 10 MG: 10 INJECTION, SOLUTION INTRAMUSCULAR; INTRAVENOUS at 16:05

## 2021-01-03 RX ADMIN — DIPHENHYDRAMINE HYDROCHLORIDE 25 MG: 50 INJECTION, SOLUTION INTRAMUSCULAR; INTRAVENOUS at 16:05

## 2021-01-03 RX ADMIN — SODIUM CHLORIDE 1000 ML: 9 INJECTION, SOLUTION INTRAVENOUS at 16:10

## 2021-01-03 RX ADMIN — KETOROLAC TROMETHAMINE 30 MG: 30 INJECTION, SOLUTION INTRAMUSCULAR at 16:05

## 2021-01-03 NOTE — ED TRIAGE NOTES
Pt here for migraine that is \"shooting from back of neck down to feet\". Pt reports this has been ongoing for 1 month. Pt spoke with neurologist who referred pt here. Sensitivity to light and sound.

## 2021-01-03 NOTE — ED NOTES
Patient discharged by provider. D/C instructions given. Pt educated to take r medications as instructed for management at home. Pt verbalized understanding, verbalized no questions. PIV removed, pressure dressing applied. Pt ambulated out of ER without difficulty, NAD.   Patient Vitals for the past 4 hrs:   Temp Pulse Resp BP SpO2   01/03/21 1746    139/86    01/03/21 1425 98.7 °F (37.1 °C) (!) 59 16 (!) 141/103 100 %

## 2021-01-03 NOTE — ED PROVIDER NOTES
This is a 40-year-old female who presents ambulatory to the emergency room with complaints of a migraine headache. Patient states her migraine started approximately 1 month ago. Has worsened over the past few days and has a pain that is \"shooting from the back of her neck down to her feet. \"  Patient has had a imaging in the past, sees a neurologist in the migraine clinic who referred patient to the emergency room for a migraine cocktail. Patient denies chest pain, shortness of breath. Mild dizziness. Positive nausea, no vomiting. No visual disturbances. Patient does state that she has sensitivity to light, needing a dark room to get rid of her headache. States the pain is a 9 out of 10 currently. Patient states \"I have taken every over-the-counter medication I can think of.\"  There are no further complaints at this time. Teressa Bender MD  Past Medical History:  No date:  Anxiety  No date: Arthritis  No date: Depression  No date: Headache  No date: Ill-defined condition      Comment:  scoliosis, gall stones, anemia  No date: Lyme disease  No date: Scoliosis  No date: Seizures [facility])  Past Surgical History:  No date: HX  SECTION      Comment:  x 4  2017: HX CHOLECYSTECTOMY  No date: HX HERNIA REPAIR  No date: HX OTHER SURGICAL      Comment:  PIC line placed for Lymes   No date: HX TUBAL LIGATION             Past Medical History:   Diagnosis Date    Anxiety     Arthritis     Depression     Headache     Ill-defined condition     scoliosis, gall stones, anemia    Lyme disease     Scoliosis     Seizures ([address]        Past Surgical History:   Procedure Laterality Date    HX  SECTION      x 4    HX CHOLECYSTECTOMY  2017    HX HERNIA REPAIR      HX OTHER SURGICAL      PIC line placed for Lymes     HX TUBAL LIGATION           Family History:   Problem Relation Age of Onset   [facility] Arthritis-rheumatoid Mother     Depression Mother     Depression Father     Diabetes Father     Cancer Maternal Aunt     Cancer Maternal Uncle     Cancer Paternal Aunt     Cancer Paternal Uncle     Heart Attack Maternal Grandmother     Cancer Maternal Grandfather        Social History     Socioeconomic History    Marital status: SINGLE     Spouse name: Not on file    Number of children: Not on file    Years of education: Not on file    Highest education level: Not on file   Occupational History    Not on file   Social Needs    Financial resource strain: Not on file    Food insecurity     Worry: Not on file     Inability: Not on file   [employer] needs     Medical: Not on file     Non-medical: Not on file   Tobacco Use    Smoking status: Never Smoker    Smokeless tobacco: Never Used   Substance and Sexual Activity    Alcohol use: No    Drug use: No    Sexual activity: Not Currently   Lifestyle    Physical activity     Days per week: Not on file     Minutes per session: Not on file    Stress: Not on file   Relationships    Social connections     Talks on phone: Not on file     Gets together: Not on file     Attends [employer] service: Not on file     Active member of club or organization: Not on file     Attends meetings of clubs or organizations: Not on file     Relationship status: Not on file    Intimate partner violence     Fear of current or ex partner: Not on file     Emotionally abused: Not on file     Physically abused: Not on file     Forced sexual activity: Not on file   Other Topics Concern    Not on file   Social History Narrative    Not on file         ALLERGIES: Other food, Morphine, Tramadol, Doxycycline, Nut - unspecified, and [place]    Review of Systems   Constitutional: Negative for appetite change, chills, diaphoresis, fatigue and fever. HENT: Negative for congestion, ear discharge, ear pain, sinus pressure, sinus pain, sore throat and trouble swallowing. Eyes: Positive for photophobia. Negative for pain, redness and visual disturbance.    Respiratory: Negative for chest tightness, shortness of breath and wheezing. Cardiovascular: Negative for chest pain and palpitations. Gastrointestinal: Positive for nausea. Negative for abdominal distention, abdominal pain and vomiting. Endocrine: Negative. Genitourinary: Negative for difficulty urinating, flank pain, frequency and urgency. Musculoskeletal: Negative for back pain, neck pain and neck stiffness. Skin: Negative for color change, pallor, rash and wound. Allergic/Immunologic: Negative. Neurological: Positive for dizziness and headaches. Negative for speech difficulty and weakness. Hematological: Does not bruise/bleed easily. Psychiatric/Behavioral: Negative for behavioral problems. The patient is not nervous/anxious. Vitals:    01/03/21 1425   BP: (!) 141/103   Pulse: (!) 59   Resp: 16   Temp: 98.7 °F (37.1 °C)   SpO2: 100%   Weight: 113.4 kg (250 lb)   Height: 5' 6\" (1.676 m)            Physical Exam  Vitals signs and nursing note reviewed. Constitutional:       General: She is not in acute distress. Appearance: Normal appearance. She is well-developed. She is not ill-appearing. HENT:      Head: Normocephalic and atraumatic. Right Ear: External ear normal.      Left Ear: External ear normal.      Nose: Nose normal.      Mouth/Throat:      Mouth: Mucous membranes are moist.   Eyes:      General:         Right eye: No discharge. Left eye: No discharge. Conjunctiva/sclera: Conjunctivae normal.      Pupils: Pupils are equal, round, and reactive to light. Neck:      Musculoskeletal: Normal range of motion and neck supple. Vascular: No JVD. Trachea: No tracheal deviation. Cardiovascular:      Rate and Rhythm: Regular rhythm. Bradycardia present. Pulses: Normal pulses. Heart sounds: Normal heart sounds. No murmur. No gallop. Pulmonary:      Effort: Pulmonary effort is normal. No respiratory distress. Breath sounds: Normal breath sounds.  No wheezing or rales. Chest:      Chest wall: No tenderness. Abdominal:      General: Bowel sounds are normal. There is no distension. Palpations: Abdomen is soft. Tenderness: There is no abdominal tenderness. There is no guarding or rebound. Genitourinary:     Comments: Deferred    Musculoskeletal: Normal range of motion. General: No tenderness. Skin:     General: Skin is warm and dry. Capillary Refill: Capillary refill takes less than 2 seconds. Coloration: Skin is not pale. Findings: No erythema or rash. Neurological:      General: No focal deficit present. Mental Status: She is alert and oriented to person, place, and time. Motor: No weakness. Coordination: Coordination normal.   Psychiatric:         Mood and Affect: Mood normal.         Behavior: Behavior normal.         Thought Content: Thought content normal.         Judgment: Judgment normal.          MDM  Number of Diagnoses or Management Options  Intractable headache, unspecified chronicity pattern, unspecified headache type: new and requires workup  Diagnosis management comments: Differential diagnosis includes sinus pain, migraine, and others. After IV migraine cocktail, headache completely relieved. Patient was discharged home and will follow up with PCP and neurology as an outpatient. Patient in agreement with plan of care. Will return to the emergency room with worsening symptoms. Amount and/or Complexity of Data Reviewed  Clinical lab tests: ordered and reviewed         5:27   States improved headache. Patient says it is gone at this time. 5:35 PM  Pt has been reexamined. Pt has no new complaints, changes or physical findings. Care plan outlined and precautions discussed. All available results were reviewed with pt. All medications were reviewed with pt. All of pt's questions and concerns were addressed. Pt agrees to F/U as instructed and agrees to return to ED upon further deterioration. Pt is ready to go home.   Fanny Gunderson NP      Procedures

## 2021-01-05 ENCOUNTER — OFFICE VISIT (OUTPATIENT)
Dept: NEUROLOGY | Age: 41
End: 2021-01-05
Payer: MEDICAID

## 2021-01-05 ENCOUNTER — TELEPHONE (OUTPATIENT)
Dept: NEUROLOGY | Age: 41
End: 2021-01-05

## 2021-01-05 VITALS
SYSTOLIC BLOOD PRESSURE: 128 MMHG | WEIGHT: 238.7 LBS | OXYGEN SATURATION: 99 % | HEART RATE: 70 BPM | BODY MASS INDEX: 38.36 KG/M2 | RESPIRATION RATE: 16 BRPM | DIASTOLIC BLOOD PRESSURE: 70 MMHG | TEMPERATURE: 96.8 F | HEIGHT: 66 IN

## 2021-01-05 DIAGNOSIS — G43.111 INTRACTABLE MIGRAINE WITH AURA WITH STATUS MIGRAINOSUS: ICD-10-CM

## 2021-01-05 DIAGNOSIS — M79.7 FIBROMYALGIA: ICD-10-CM

## 2021-01-05 DIAGNOSIS — M54.50 CHRONIC LOW BACK PAIN, UNSPECIFIED BACK PAIN LATERALITY, UNSPECIFIED WHETHER SCIATICA PRESENT: Primary | ICD-10-CM

## 2021-01-05 DIAGNOSIS — G89.29 CHRONIC LOW BACK PAIN, UNSPECIFIED BACK PAIN LATERALITY, UNSPECIFIED WHETHER SCIATICA PRESENT: Primary | ICD-10-CM

## 2021-01-05 PROCEDURE — 99215 OFFICE O/P EST HI 40 MIN: CPT | Performed by: PSYCHIATRY & NEUROLOGY

## 2021-01-05 RX ORDER — DEXAMETHASONE SODIUM PHOSPHATE 4 MG/ML
10 INJECTION, SOLUTION INTRA-ARTICULAR; INTRALESIONAL; INTRAMUSCULAR; INTRAVENOUS; SOFT TISSUE ONCE
Status: COMPLETED | OUTPATIENT
Start: 2021-01-06 | End: 2021-01-06

## 2021-01-05 RX ORDER — DIPHENHYDRAMINE HYDROCHLORIDE 50 MG/ML
25 INJECTION, SOLUTION INTRAMUSCULAR; INTRAVENOUS ONCE
Status: COMPLETED | OUTPATIENT
Start: 2021-01-06 | End: 2021-01-06

## 2021-01-05 RX ORDER — MONTELUKAST SODIUM 10 MG/1
TABLET ORAL
COMMUNITY
Start: 2020-04-10

## 2021-01-05 RX ORDER — GALCANEZUMAB 120 MG/ML
120 INJECTION, SOLUTION SUBCUTANEOUS
Qty: 1 SYRINGE | Refills: 5 | Status: SHIPPED | OUTPATIENT
Start: 2021-01-05

## 2021-01-05 RX ORDER — DEXAMETHASONE SODIUM PHOSPHATE 4 MG/ML
10 INJECTION, SOLUTION INTRA-ARTICULAR; INTRALESIONAL; INTRAMUSCULAR; INTRAVENOUS; SOFT TISSUE ONCE
Status: COMPLETED | OUTPATIENT
Start: 2021-01-07 | End: 2021-01-07

## 2021-01-05 RX ORDER — DIPHENHYDRAMINE HYDROCHLORIDE 50 MG/ML
25 INJECTION, SOLUTION INTRAMUSCULAR; INTRAVENOUS ONCE
Status: COMPLETED | OUTPATIENT
Start: 2021-01-07 | End: 2021-01-07

## 2021-01-05 RX ORDER — METOCLOPRAMIDE HYDROCHLORIDE 5 MG/ML
10 INJECTION INTRAMUSCULAR; INTRAVENOUS ONCE
Status: COMPLETED | OUTPATIENT
Start: 2021-01-06 | End: 2021-01-06

## 2021-01-05 RX ORDER — GALCANEZUMAB 120 MG/ML
240 INJECTION, SOLUTION SUBCUTANEOUS ONCE
Qty: 2 ML | Refills: 0 | Status: SHIPPED | COMMUNITY
Start: 2021-01-05 | End: 2021-01-05

## 2021-01-05 RX ORDER — VENLAFAXINE 25 MG/1
TABLET ORAL
COMMUNITY
Start: 2020-09-15 | End: 2021-11-10

## 2021-01-05 RX ORDER — DICLOFENAC POTASSIUM 50 MG/1
50 POWDER, FOR SOLUTION ORAL AS NEEDED
Qty: 2 PACKET | Refills: 0 | Status: SHIPPED | COMMUNITY
Start: 2021-01-05 | End: 2021-11-10 | Stop reason: DRUGHIGH

## 2021-01-05 RX ORDER — METOCLOPRAMIDE HYDROCHLORIDE 5 MG/ML
10 INJECTION INTRAMUSCULAR; INTRAVENOUS ONCE
Status: COMPLETED | OUTPATIENT
Start: 2021-01-07 | End: 2021-01-07

## 2021-01-05 NOTE — PROGRESS NOTES
Chief Complaint   Patient presents with    Headache     Seen in ER for severe headaches, \"pain going through my body\" \"body goes numb\".      Visit Vitals  /70 (BP 1 Location: Right arm, BP Patient Position: Sitting)   Pulse 70   Temp 96.8 °F (36 °C)   Resp 16   Ht 5' 6\" (1.676 m)   Wt 108.3 kg (238 lb 11.2 oz)   SpO2 99%   BMI 38.53 kg/m²

## 2021-01-05 NOTE — PROGRESS NOTES
Neurology Progress Note    Patient ID:  Yves Montana  [id number]  89 y.o.  1980      Subjective:   History:  Krystle Rose a  female who  has a past medical history of Arthritis; Depression; Headache; and Seizures ([facility]). who in Sept 2018, noted blurred vision of the R eye, described as there is a film in the eyes and severe headache R frontal and R temple area, R periorbital, shooting to back of head, occurring every day, throbbing, constant, 8/10, no aggravating/relivieng factor with nausea and photophobia. Considerations include migraine, with visual auras, intractable, hemicrania continua and pseudotumor cerebri. Patient saw Dr Delvin Loaiza (ophtalmologist) who said everything was fine. Patient was placed on Propranolol and Wellbutrin with no clear benefit. Tried Amitriptyline which helped in the past.  Patient was seen at ER with MRI brain showing non-specific white matter changes. Spinal tap was unremarkable.     Patient still has chronic low back pain now seeing Dr Radha Moreno, Pain management at [facility] on Effexor and Tizanidine which helps her sleep. 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 Had migraines since 2018. Tried Propanolol, Topamax, Trokendi XR (paresthesia), Cymbalta, Indomethacin.  Chema helped.        ROS:  Per HPI-  Otherwise the remainder of ROS was negative    Social Hx  Social History     Socioeconomic History    Marital status: SINGLE     Spouse name: Not on file    Number of children: Not on file    Years of education: Not on file    Highest education level: Not on file   Tobacco Use    Smoking status: Never Smoker    Smokeless tobacco: Never Used   Substance and Sexual Activity    Alcohol use: No    Drug use: No    Sexual activity: Not Currently       Meds:  Current Outpatient Medications on File Prior to Visit   Medication Sig Dispense Refill    montelukast (Singulair) 10 mg tablet       tizanidine HCl (TIZANIDINE PO)       venlafaxine (EFFEXOR) 25 mg tablet 25 mg = 1 tab each dose, PO, tid, # 90 tab, 1 Refills, Pharmacy: [facility]/pharmacy #[id number]     albuterol (PROVENTIL VENTOLIN) 2.5 mg /3 mL (0.083 %) nebulizer solution by Nebulization route every four (4) hours as needed.  lidocaine (Lidoderm) 5 % 3 Patches by TransDERmal route every twenty-four (24) hours. Apply patch to the affected area for 12 hours a day and remove for 12 hours a day. 30 Each 0    methocarbamoL (Robaxin-750) 750 mg tablet Take 1 Tab by mouth four (4) times daily. As needed for muscle spasm  Indications: muscle spasm 20 Tab 0    predniSONE (DELTASONE) 10 mg tablet Take with breakfast daily on a taper: 6 tabs PO for 3 days then 4 tabs PO for 3 days then 2 tabs PO for 3 days then 1 tab PO for 3 days 39 Tab 0    milnacipran (SAVELLA) 25 mg tablet Take  by mouth.  nortriptyline (PAMELOR) 25 mg capsule Take  by mouth nightly.  promethazine-codeine (PHENERGAN WITH CODEINE) 6.25-10 mg/5 mL syrup Take  by mouth four (4) times daily as needed for Cough.  benzonatate (TESSALON) 100 mg capsule Take 100 mg by mouth three (3) times daily as needed for Cough.  DULoxetine (CYMBALTA) 30 mg capsule Take 1 Cap by mouth daily. 90 Cap 1     No current facility-administered medications on file prior to visit. Imaging:    CT Results (recent):  Results from Hospital Encounter encounter on 10/15/19   CT ABD PELV W CONT    Narrative EXAM: CT ABD PELV W CONT    INDICATION: right sided abdominal pain    COMPARISON: None     CONTRAST: 100 mL of Isovue-370. TECHNIQUE:   Following the uneventful intravenous administration of contrast, thin axial  images were obtained through the abdomen and pelvis. Coronal and sagittal  reconstructions were generated. Oral contrast was not administered.  CT dose  reduction was achieved through use of a standardized protocol tailored for this  examination and automatic exposure control for dose modulation. FINDINGS:   LUNG BASES: Clear. INCIDENTALLY IMAGED HEART AND MEDIASTINUM: Unremarkable. LIVER: No biliary dilatation. Right hepatic macrolobulated 11 mm structure  (series 2, image 14) with density measurement of the 3 compatible with a simple  cyst.  GALLBLADDER: Unremarkable. SPLEEN: No mass. PANCREAS: No mass or ductal dilatation. ADRENALS: Unremarkable. KIDNEYS: No calculus, or hydronephrosis. Right upper renal 1 cm hypodensity  (image coronal 76 with density of 30 HU  STOMACH: Unremarkable. SMALL BOWEL: No dilatation or wall thickening. COLON: No dilatation or wall thickening. APPENDIX: Unremarkable. Coronal image 43). PERITONEUM: Small amount of complex free fluid in the cul-de-sac. RETROPERITONEUM: No lymphadenopathy or aortic aneurysm. REPRODUCTIVE ORGANS: Normal uterus. Right ovarian cyst measures 3.9 x 2.8 cm and  has a density measurement of 17 HU. URINARY BLADDER: No mass or calculus. BONES: No destructive bone lesion. Levoconvex mid lumbar scoliosis  ADDITIONAL COMMENTS: Fat-containing umbilical hernia with neck measurement of 7  mm in outer diameter of 18 mm. Superimposed on a diastases. Impression IMPRESSION:  Small amount of complex free fluid. Right ovarian complex cyst.   Normal appendix. Right renal hypodensity, too's small to further characterize. Ultrasound  characterization is recommended. MRI Results (recent):  Results from [place] encounter on 09/14/18   MRI BRAIN W WO CONT    Narrative EXAM: MRI BRAIN     INDICATION:   Include scan of orbits bilaterally. Rule out MS and Optic  Neuritis per Ophthalmology   Vision loss     SEQUENCES:   Sagittal T1, axial T1, T2, GRE and FLAIR; axial and coronal T1 post  enhancement; and diffusion imaging of the brain. 19 mL of Dotarem.  Thin section  imaging is also performed through the orbits with and without contrast.    FINDINGS:     Orbital soft tissues, optic nerves and chiasm are normal in size, morphology,  signal intensity and enhancement. There are a few punctate white matter foci of T2 hyperintensity in the left  parietal lobe, nonspecific. Remainder of the brain including corpus callosum and  pericallosal white matter show normal signal intensity. The enhancement is  normal.      There is no evidence for mass, hemorrhage, shift, or hydrocephalus. There is no  extra-axial fluid collection. Diffusion imaging shows no diffusion restriction  to indicate acute infarct/ischemia or other process. Impression IMPRESSION: Few nonspecific left parietal white matter T2 hyperintensities. Normal appearance of the orbits. Normal diffusion and enhancement. IR Results (recent):  No results found for this or any previous visit.     VAS/US Results (recent):  Results from Hospital Encounter encounter on 10/31/18   DUPLEX LOWER EXT VENOUS RIGHT       Reviewed records in [facility] and media tab today    Lab Review     Admission on 01/03/2021, Discharged on 01/03/2021   Component Date Value Ref Range Status    WBC 01/03/2021 8.4  3.6 - 11.0 K/uL Final    RBC 01/03/2021 4.25  3.80 - 5.20 M/uL Final    HGB 01/03/2021 11.9  11.5 - 16.0 g/dL Final    HCT 01/03/2021 37.7  35.0 - 47.0 % Final    MCV 01/03/2021 88.7  80.0 - 99.0 FL Final    MCH 01/03/2021 28.0  26.0 - 34.0 PG Final    MCHC 01/03/2021 31.6  30.0 - 36.5 g/dL Final    RDW 01/03/2021 13.8  11.5 - 14.5 % Final    PLATELET 45/48/0170 475  150 - 400 K/uL Final    MPV 01/03/2021 10.2  8.9 - 12.9 FL Final    NRBC 01/03/2021 0.0  0  WBC Final    ABSOLUTE NRBC 01/03/2021 0.00  0.00 - 0.01 K/uL Final    NEUTROPHILS 01/03/2021 66  32 - 75 % Final    LYMPHOCYTES 01/03/2021 27  12 - 49 % Final    MONOCYTES 01/03/2021 5  5 - 13 % Final    EOSINOPHILS 01/03/2021 1  0 - 7 % Final    BASOPHILS 01/03/2021 1  0 - 1 % Final    IMMATURE GRANULOCYTES 01/03/2021 0  0.0 - 0.5 % Final    ABS. NEUTROPHILS 01/03/2021 5.6  1.8 - 8.0 K/UL Final    ABS. LYMPHOCYTES 01/03/2021 2.3  0.8 - 3.5 K/UL Final    ABS. MONOCYTES 01/03/2021 0.4  0.0 - 1.0 K/UL Final    ABS. EOSINOPHILS 01/03/2021 0.1  0.0 - 0.4 K/UL Final    ABS. BASOPHILS 01/03/2021 0.0  0.0 - 0.1 K/UL Final    ABS. IMM. GRANS. 01/03/2021 0.0  0.00 - 0.04 K/UL Final    DF 01/03/2021 AUTOMATED    Final    Sodium 01/03/2021 140  136 - 145 mmol/L Final    Potassium 01/03/2021 3.4* 3.5 - 5.1 mmol/L Final    Chloride 01/03/2021 109* 97 - 108 mmol/L Final    CO2 01/03/2021 27  21 - 32 mmol/L Final    Anion gap 01/03/2021 4* 5 - 15 mmol/L Final    Glucose 01/03/2021 77  65 - 100 mg/dL Final    BUN 01/03/2021 14  6 - 20 MG/DL Final    Creatinine 01/03/2021 1.03* 0.55 - 1.02 MG/DL Final    BUN/Creatinine ratio 01/03/2021 14  12 - 20   Final    GFR est AA 01/03/2021 >60  >60 ml/min/1.73m2 Final    GFR est non-AA 01/03/2021 59* >60 ml/min/1.73m2 Final    Estimated GFR is calculated using the IDMS-traceable Modification of Diet in Renal Disease (MDRD) Study equation, reported for both  Americans (GFRAA) and non- Americans (GFRNA), and normalized to 1.73m2 body surface area. The physician must decide which value applies to the patient.  Calcium 01/03/2021 9.1  8.5 - 10.1 MG/DL Final    SAMPLES BEING HELD 01/03/2021 NO TUBES ON HOLD   Final    COMMENT 01/03/2021 Add-on orders for these samples will be processed based on acceptable specimen integrity and analyte stability, which may vary by analyte.     Final   Admission on 11/06/2020, Discharged on 11/06/2020   Component Date Value Ref Range Status    WBC 11/06/2020 6.9  3.6 - 11.0 K/uL Final    RBC 11/06/2020 4.05  3.80 - 5.20 M/uL Final    HGB 11/06/2020 11.4* 11.5 - 16.0 g/dL Final    HCT 11/06/2020 36.1  35.0 - 47.0 % Final    MCV 11/06/2020 89.1  80.0 - 99.0 FL Final    MCH 11/06/2020 28.1  26.0 - 34.0 PG Final    MCHC 11/06/2020 31.6  30.0 - 36.5 g/dL Final    RDW 11/06/2020 13.8  11.5 - 14.5 % Final    PLATELET 19/00/3646 955  150 - 400 K/uL Final    RESULTS VERIFIED BY SMEAR    NRBC 11/06/2020 0.0  0  WBC Final    ABSOLUTE NRBC 11/06/2020 0.00  0.00 - 0.01 K/uL Final    NEUTROPHILS 11/06/2020 60  32 - 75 % Final    LYMPHOCYTES 11/06/2020 31  12 - 49 % Final    MONOCYTES 11/06/2020 6  5 - 13 % Final    EOSINOPHILS 11/06/2020 3  0 - 7 % Final    BASOPHILS 11/06/2020 0  0 - 1 % Final    IMMATURE GRANULOCYTES 11/06/2020 0  0.0 - 0.5 % Final    ABS. NEUTROPHILS 11/06/2020 4.2  1.8 - 8.0 K/UL Final    ABS. LYMPHOCYTES 11/06/2020 2.1  0.8 - 3.5 K/UL Final    ABS. MONOCYTES 11/06/2020 0.4  0.0 - 1.0 K/UL Final    ABS. EOSINOPHILS 11/06/2020 0.2  0.0 - 0.4 K/UL Final    ABS. BASOPHILS 11/06/2020 0.0  0.0 - 0.1 K/UL Final    ABS. IMM. GRANS. 11/06/2020 0.0  0.00 - 0.04 K/UL Final    DF 11/06/2020 SMEAR SCANNED    Final    RBC COMMENTS 11/06/2020     Final                    Value:JUAN CELLS  PRESENT      Sodium 11/06/2020 138  136 - 145 mmol/L Final    Potassium 11/06/2020 4.3  3.5 - 5.1 mmol/L Final    SPECIMEN HEMOLYZED, RESULTS MAY BE AFFECTED    Chloride 11/06/2020 108  97 - 108 mmol/L Final    CO2 11/06/2020 24  21 - 32 mmol/L Final    Anion gap 11/06/2020 6  5 - 15 mmol/L Final    Glucose 11/06/2020 94  65 - 100 mg/dL Final    BUN 11/06/2020 15  6 - 20 MG/DL Final    Creatinine 11/06/2020 0.97  0.55 - 1.02 MG/DL Final    BUN/Creatinine ratio 11/06/2020 15  12 - 20   Final    GFR est AA 11/06/2020 >60  >60 ml/min/1.73m2 Final    GFR est non-AA 11/06/2020 >60  >60 ml/min/1.73m2 Final    Estimated GFR is calculated using the IDMS-traceable Modification of Diet in Renal Disease (MDRD) Study equation, reported for both  Americans (GFRAA) and non- Americans (GFRNA), and normalized to 1.73m2 body surface area.  The physician must decide which value applies to the patient.  Calcium 11/06/2020 8.9  8.5 - 10.1 MG/DL Final    Bilirubin, total 11/06/2020 0.3  0.2 - 1.0 MG/DL Final    ALT (SGPT) 11/06/2020 26  12 - 78 U/L Final    AST (SGOT) 11/06/2020 36  15 - 37 U/L Final    SPECIMEN HEMOLYZED, RESULTS MAY BE AFFECTED    Alk. phosphatase 11/06/2020 72  45 - 117 U/L Final    Protein, total 11/06/2020 7.9  6.4 - 8.2 g/dL Final    Albumin 11/06/2020 3.4* 3.5 - 5.0 g/dL Final    Globulin 11/06/2020 4.5* 2.0 - 4.0 g/dL Final    A-G Ratio 11/06/2020 0.8* 1.1 - 2.2   Final    NT pro-BNP 11/06/2020 74  <125 PG/ML Final    Comment:      NT-proBNP is highly sensitive for the detection of acute congestive heart failure in dyspneic patients. A NT-proBNP result <300 pg/mL has a negative predictive value of 98% for acute heart failure. Marked elevations in NT-proBNP levels may be observed in patients with left ventricular congestive failure, atrial fibrillation without clear heart failure, acute coronary syndromes, right heart strain/failure (including pulmonary embolism and cor pulmonale), critical illness, renal failure or advanced age. Falsely low NT-proBNP may be observed in obese CHF patients.        Recent research  suggests the following cutoff values are appropriate based on patient age and clinical setting, reduce false positive (FP) results, and improve positive predictive values for acute heart failure:  Acutely dyspneic patient: age <50, use cutoff of 450 pg/mL  Acutely dyspneic patient: age 54-65, use cutoff of 900 pg/mL  Acutely dyspneic patient: age                            >76, use cutoff of 1800 pg/mL       FDA approved cutpoints for ruling-out heart failure in the office:  Ambulatory patient: age <76, use cutoff of 125 pg/mL  Ambulatory patient: age >76, use cutoff of 450 pg/mL      SAMPLES BEING HELD 11/06/2020 1red   Final    COMMENT 11/06/2020 Add-on orders for these samples will be processed based on acceptable specimen integrity and analyte stability, which may vary by analyte. Final         Objective:       Exam:  Visit Vitals  /70 (BP 1 Location: Right arm, BP Patient Position: Sitting)   Pulse 70   Temp 96.8 °F (36 °C)   Resp 16   Ht 5' 6\" (1.676 m)   Wt 108.3 kg (238 lb 11.2 oz)   SpO2 99%   BMI 38.53 kg/m²     Gen: Awake, alert, follows commands  Appropriate appearance, normal speech. Oriented to all spheres. No visual field defect on confrontation exam.  Full eyes movement, with no nystagmus, no diplopia, no ptosis. Normal gag and swallow. All remaining cranial nerves were normal  Motor function: 5/5 in all extremities  Sensory: intact to LT, PP and JPS  Good FTN and HTS   Gait: Normal    Assessment:       ICD-10-CM ICD-9-CM    1. Chronic low back pain, unspecified back pain laterality, unspecified whether sciatica present  M54.5 724.2     G89.29 338.29    2. Intractable migraine with aura with status migrainosus  G43. 111 346.03    3. Fibromyalgia  M79.7 729.1            Plan:   1. Due to failure of multiple meds (Topamax, Propanolol, Cymbalta) will put her on Emgality 240 mg SC first dose, then 120 mg SC montthly for migraine prevention  2. Will send her for Chema protocol to break headache cycle  3. Given samples of Cambia, brevly and Nurtec to abort headache. (Triptans relatively contraindicated due to patient already on SSRI  4. Continue headache diary and list that can trigger headache (more likely her working nights is contributing to worsening headaches)       Follow-up and Dispositions    · Return in about 6 weeks (around 2/16/2021).                Carol Oviedo MD  Diplomate, American Board of Psychiatry and Neurology  Diplomate, Neuromuscular Medicine  Diplomate, American Board of Electrodiagnostic Medicine

## 2021-01-05 NOTE — LETTER
1/5/2021 Patient: Riley Collins YOB: 1980 Date of Visit: 1/5/2021 MD Zenaida Landis Jesus [address] [facility] [address] [id number] [address] [employer] Dear Aldo Gaytan MD, Thank you for referring Ms. Rossana Mayorga to [facility] for evaluation. My notes for this consultation are attached. If you have questions, please do not hesitate to call me. I look forward to following your patient along with you. Sincerely, Derek Pelayo MD

## 2021-01-06 ENCOUNTER — HOSPITAL ENCOUNTER (OUTPATIENT)
Dept: INFUSION THERAPY | Age: 41
Discharge: HOME | End: 2021-01-06
Payer: MEDICAID

## 2021-01-06 VITALS
DIASTOLIC BLOOD PRESSURE: 88 MMHG | OXYGEN SATURATION: 98 % | RESPIRATION RATE: 18 BRPM | HEART RATE: 53 BPM | TEMPERATURE: 97.4 F | SYSTOLIC BLOOD PRESSURE: 127 MMHG

## 2021-01-06 PROCEDURE — 74011000258 HC RX REV CODE- 258: Performed by: PSYCHIATRY & NEUROLOGY

## 2021-01-06 PROCEDURE — 74011250636 HC RX REV CODE- 250/636: Performed by: PSYCHIATRY & NEUROLOGY

## 2021-01-06 PROCEDURE — 96365 THER/PROPH/DIAG IV INF INIT: CPT

## 2021-01-06 PROCEDURE — 74011000250 HC RX REV CODE- 250: Performed by: PSYCHIATRY & NEUROLOGY

## 2021-01-06 PROCEDURE — 96375 TX/PRO/DX INJ NEW DRUG ADDON: CPT

## 2021-01-06 RX ORDER — METOCLOPRAMIDE HYDROCHLORIDE 5 MG/ML
10 INJECTION INTRAMUSCULAR; INTRAVENOUS ONCE
Status: COMPLETED | OUTPATIENT
Start: 2021-01-08 | End: 2021-01-08

## 2021-01-06 RX ORDER — DIPHENHYDRAMINE HYDROCHLORIDE 50 MG/ML
25 INJECTION, SOLUTION INTRAMUSCULAR; INTRAVENOUS ONCE
Status: COMPLETED | OUTPATIENT
Start: 2021-01-08 | End: 2021-01-08

## 2021-01-06 RX ORDER — SODIUM CHLORIDE 0.9 % (FLUSH) 0.9 %
5-10 SYRINGE (ML) INJECTION AS NEEDED
Status: CANCELLED | OUTPATIENT
Start: 2021-01-06 | End: 2021-01-07

## 2021-01-06 RX ORDER — DEXAMETHASONE SODIUM PHOSPHATE 4 MG/ML
10 INJECTION, SOLUTION INTRA-ARTICULAR; INTRALESIONAL; INTRAMUSCULAR; INTRAVENOUS; SOFT TISSUE ONCE
Status: COMPLETED | OUTPATIENT
Start: 2021-01-08 | End: 2021-01-08

## 2021-01-06 RX ORDER — SODIUM CHLORIDE 9 MG/ML
25 INJECTION, SOLUTION INTRAVENOUS CONTINUOUS
Status: DISPENSED | OUTPATIENT
Start: 2021-01-08 | End: 2021-01-08

## 2021-01-06 RX ADMIN — DEXAMETHASONE SODIUM PHOSPHATE 10 MG: 4 INJECTION, SOLUTION INTRA-ARTICULAR; INTRALESIONAL; INTRAMUSCULAR; INTRAVENOUS; SOFT TISSUE at 08:46

## 2021-01-06 RX ADMIN — METOCLOPRAMIDE 10 MG: 5 INJECTION, SOLUTION INTRAMUSCULAR; INTRAVENOUS at 08:57

## 2021-01-06 RX ADMIN — DIHYDROERGOTAMINE MESYLATE 1 MG: 1 INJECTION, SOLUTION INTRAMUSCULAR; INTRAVENOUS; SUBCUTANEOUS at 09:35

## 2021-01-06 RX ADMIN — VALPROATE SODIUM 500 MG: 100 INJECTION, SOLUTION INTRAVENOUS at 09:55

## 2021-01-06 RX ADMIN — DIPHENHYDRAMINE HYDROCHLORIDE 25 MG: 50 INJECTION INTRAMUSCULAR; INTRAVENOUS at 08:50

## 2021-01-06 NOTE — PROGRESS NOTES
[facility] Progress Note    Date: 2021    Name: Melonie Barr    MRN: [id number]         : 1980        0825: Pt arrived ambulatory for migraine treatment using the RASKINS regimen. [facility] COVID-19 SCREENING    The patient was asked the following questions and answered as documented below:    1. Do you have any symptoms of COVID-19? SOB, coughing, fever, or generally not feeling well? NO  2. Have you been exposed to COVID-19 recently? NO  3. Have you had any recent contact with family/friend that has a pending COVID test? NO      Follow Up: Proceed with treatment    Assessment completed. Pt reports migraine x one month, currently rated at 10 on numeric scale. Patient also reports dizziness, nausea and blurry vision in the right eye related to the migraine. IV established in the LFA without difficulty.     Patient Vitals for the past 12 hrs:   Temp Pulse Resp BP SpO2   21 1101  (!) 53  127/88    21 0830 97.4 °F (36.3 °C) 60 18 118/81 98 %         Medications Administered     dexamethasone (DECADRON) 4 mg/mL injection 10 mg     Admin Date  2021 Action  Given Dose  10 mg Route  IntraVENous Administered By  Peter Doll RN          dihydroergotamine (DHE-45) 1 mg in 0.9% sodium chloride 50 mL, overfill volume 5 mL IVPB     Admin Date  2021 Action  Given Dose  1 mg Route  IntraVENous Administered By  Peter Doll RN          diphenhydrAMINE (BENADRYL) injection 25 mg     Admin Date  2021 Action  Given Dose  25 mg Route  IntraVENous Administered By  Peter Doll RN          metoclopramide HCl (REGLAN) injection 10 mg     Admin Date  2021 Action  Given Dose  10 mg Route  IntraVENous Administered By  Peter Doll RN          valproate (DEPACON) 500 mg in 0.9% sodium chloride 50 mL, overfill volume 5 mL IVPB     Admin Date  2021 Action  New Bag Dose  500 mg Rate  60 mL/hr Route  IntraVENous Administered By  Peter Doll RN 1110:  Tolerated treatment well, no adverse reactions noted. Pain rated at 4 prior to D/C. IV wrapped and left in place for tomorrow. D/Cd from [facility] ambulatory and in no distress.       Future Appointments   Date Time Provider Rachel Mckenna   1/7/2021  9:00 AM [facility] INFUSION NURSE [address] [employer]   1/8/2021  9:00 AM A5 PEDS MED [address] [facility]   2/16/2021  9:40 AM Corinne Nolasco  S [facility] [place] BS AMB           Rosangela Castillo, RN  January 6, 2021

## 2021-01-06 NOTE — TELEPHONE ENCOUNTER
Jennifer Lam  You 2 hours ago (7:52 AM)     Hey, yes. Was approved yesterday. Must have not forwarded you the approval info. Sorry!

## 2021-01-07 ENCOUNTER — HOSPITAL ENCOUNTER (OUTPATIENT)
Dept: INFUSION THERAPY | Age: 41
Discharge: HOME | End: 2021-01-07
Payer: MEDICAID

## 2021-01-07 VITALS
HEART RATE: 46 BPM | SYSTOLIC BLOOD PRESSURE: 148 MMHG | OXYGEN SATURATION: 100 % | DIASTOLIC BLOOD PRESSURE: 85 MMHG | RESPIRATION RATE: 18 BRPM | TEMPERATURE: 97.7 F

## 2021-01-07 PROCEDURE — 74011000250 HC RX REV CODE- 250: Performed by: PSYCHIATRY & NEUROLOGY

## 2021-01-07 PROCEDURE — 96375 TX/PRO/DX INJ NEW DRUG ADDON: CPT

## 2021-01-07 PROCEDURE — 74011250636 HC RX REV CODE- 250/636: Performed by: PSYCHIATRY & NEUROLOGY

## 2021-01-07 PROCEDURE — 74011000258 HC RX REV CODE- 258: Performed by: PSYCHIATRY & NEUROLOGY

## 2021-01-07 PROCEDURE — 96365 THER/PROPH/DIAG IV INF INIT: CPT

## 2021-01-07 RX ADMIN — DEXAMETHASONE SODIUM PHOSPHATE 10 MG: 4 INJECTION, SOLUTION INTRA-ARTICULAR; INTRALESIONAL; INTRAMUSCULAR; INTRAVENOUS; SOFT TISSUE at 09:48

## 2021-01-07 RX ADMIN — DIHYDROERGOTAMINE MESYLATE 1 MG: 1 INJECTION, SOLUTION INTRAMUSCULAR; INTRAVENOUS; SUBCUTANEOUS at 10:32

## 2021-01-07 RX ADMIN — DIPHENHYDRAMINE HYDROCHLORIDE 25 MG: 50 INJECTION INTRAMUSCULAR; INTRAVENOUS at 09:53

## 2021-01-07 RX ADMIN — METOCLOPRAMIDE 10 MG: 5 INJECTION, SOLUTION INTRAMUSCULAR; INTRAVENOUS at 10:03

## 2021-01-07 RX ADMIN — VALPROATE SODIUM 500 MG: 100 INJECTION, SOLUTION INTRAVENOUS at 10:50

## 2021-01-07 NOTE — PROGRESS NOTES
[facility] Progress Note    Date: 2021    Name: Evette Ann    MRN: [id number]         : 1980        0935: Pt arrived ambulatory and in no distress for migraine treatment using the RASKINS regimen day 2/3. [facility] COVID-19 SCREENING    The patient was asked the following questions and answered as documented below:    1. Do you have any symptoms of COVID-19? SOB, coughing, fever, or generally not feeling well? NO  2. Have you been exposed to COVID-19 recently? NO  3. Have you had any recent contact with family/friend that has a pending COVID test? NO      Follow Up: Proceed with treatment    Assessment completed. Pt reports migraine x on month, currently rated at 2 on numeric scale. Still with c/o nausea and dizziness. IV flushed and patent.     Problem: Pain  Goal: *Control of Pain  Outcome: Progressing Towards Goal     Problem: Patient Education:  Go to Education Activity  Goal: Patient/Family Education  Outcome: Progressing Towards Goal    Patient Vitals for the past 12 hrs:   Temp Pulse Resp BP SpO2   21 1154  (!) 46  (!) 148/85    21 0939 97.7 °F (36.5 °C) 60 18 108/66 100 %         Medications Administered     dexamethasone (DECADRON) 4 mg/mL injection 10 mg     Admin Date  2021 Action  Given Dose  10 mg Route  IntraVENous Administered By  Gricelda Truong, TANYA          dihydroergotamine (DHE-45) 1 mg in 0.9% sodium chloride 50 mL, overfill volume 5 mL IVPB     Admin Date  2021 Action  Given Dose  1 mg Route  IntraVENous Administered By  Gricelda Truong RN          diphenhydrAMINE (BENADRYL) injection 25 mg     Admin Date  2021 Action  Given Dose  25 mg Route  IntraVENous Administered By  Gricelda Truong RN          metoclopramide HCl (REGLAN) injection 10 mg     Admin Date  2021 Action  Given Dose  10 mg Route  IntraVENous Administered By  Gricelda Truong RN          valproate (DEPACON) 500 mg in 0.9% sodium chloride 50 mL, overfill volume 5 mL IVPB     Admin Date  01/07/2021 Action  New Bag Dose  500 mg Rate  60 mL/hr Route  IntraVENous Administered By  Heron Cisneros, TANYA                1200: Tolerated treatment well, no adverse reactions noted. Pain rated at 1 prior to D/C. IV flushed, capped, and wrapped, IV left in place to be used tomorrow. D/Cd from [facility] ambulatory and in no distress.       Future Appointments   Date Time Provider Rachel Mckenna   1/8/2021  9:00 AM [address]   2/16/2021  9:40 AM Will Marino  S [facility] BS SUNIL Lau RN  January 7, 2021

## 2021-01-08 ENCOUNTER — HOSPITAL ENCOUNTER (OUTPATIENT)
Dept: INFUSION THERAPY | Age: 41
Discharge: HOME | End: 2021-01-08
Payer: MEDICAID

## 2021-01-08 VITALS
TEMPERATURE: 98.9 F | HEART RATE: 56 BPM | DIASTOLIC BLOOD PRESSURE: 81 MMHG | RESPIRATION RATE: 16 BRPM | OXYGEN SATURATION: 97 % | SYSTOLIC BLOOD PRESSURE: 129 MMHG

## 2021-01-08 PROCEDURE — 74011000258 HC RX REV CODE- 258: Performed by: PSYCHIATRY & NEUROLOGY

## 2021-01-08 PROCEDURE — 74011000250 HC RX REV CODE- 250: Performed by: PSYCHIATRY & NEUROLOGY

## 2021-01-08 PROCEDURE — 96365 THER/PROPH/DIAG IV INF INIT: CPT

## 2021-01-08 PROCEDURE — 74011250636 HC RX REV CODE- 250/636: Performed by: PSYCHIATRY & NEUROLOGY

## 2021-01-08 PROCEDURE — 96375 TX/PRO/DX INJ NEW DRUG ADDON: CPT

## 2021-01-08 RX ADMIN — METOCLOPRAMIDE 10 MG: 5 INJECTION, SOLUTION INTRAMUSCULAR; INTRAVENOUS at 10:04

## 2021-01-08 RX ADMIN — SODIUM CHLORIDE 25 ML/HR: 900 INJECTION, SOLUTION INTRAVENOUS at 09:20

## 2021-01-08 RX ADMIN — DIHYDROERGOTAMINE MESYLATE 1 MG: 1 INJECTION, SOLUTION INTRAMUSCULAR; INTRAVENOUS; SUBCUTANEOUS at 10:51

## 2021-01-08 RX ADMIN — DIPHENHYDRAMINE HYDROCHLORIDE 25 MG: 50 INJECTION INTRAMUSCULAR; INTRAVENOUS at 10:12

## 2021-01-08 RX ADMIN — DEXAMETHASONE SODIUM PHOSPHATE 10 MG: 4 INJECTION, SOLUTION INTRA-ARTICULAR; INTRALESIONAL; INTRAMUSCULAR; INTRAVENOUS; SOFT TISSUE at 10:08

## 2021-01-08 RX ADMIN — SODIUM CHLORIDE 500 MG: 9 INJECTION, SOLUTION INTRAVENOUS at 11:19

## 2021-01-21 NOTE — PROGRESS NOTES
Physical Therapy at [facility],   a part of  [facility]  [place]  [facility]  Shay Manuel  Phone: [phone]  Fax: [phone]    Discharge Summary  2-15    Patient name: Kylee Rivera  : 1980  Provider#: [id number]  Referral source: Trell Pritchard MD      Medical/Treatment Diagnosis: Low back pain [M54.5]     Prior Hospitalization: see medical history     Comorbidities: See Plan of Care  Prior Level of Function:See Plan of Care  Medications: Verified on Patient Summary List    Start of Care: 2020      Onset Date:Several months ago   Visits from Start of Care: 1     Missed Visits: 3 no shows  Reporting Period : 2020       ASSESSMENT/SUMMARY OF CARE: Ms. Rey Willson was evaluated for her chronic back pain on 2020 and given a home exercise program. She did not return to the clinic and will now be discharged.       Short Term Goals: To be accomplished in 4 weeks:  1. Patient will be able to stand for 10 minutes with <5/10 LE pain reported. Not met. 2. Patient will be able to walk two blocks with <5/10 LE pain reported. Not met. 3. Patient will be able bend forward and lift 25# from the floor with <5/10 back pain reported. Not met. Long Term Goals: To be accomplished in 12 weeks:  1. Patient will be able to stand for 30 minutes with <3/10 LE pain. Not met. 2. Patient will be able to walk 1/4 mile with <3/10 LE pain. Not met. 3. Patient will be able to bend forward and lift 50# from the floor with <3/10 back pain. Not met.     RECOMMENDATIONS:  [x]Discontinue therapy: [x]Patient has reached or is progressing toward set goals      []Patient is non-compliant or has abdicated      []Due to lack of appreciable progress towards set goals      []Other    Godfrey Snell, PT 2021

## 2021-02-18 ENCOUNTER — VIRTUAL VISIT (OUTPATIENT)
Dept: NEUROLOGY | Age: 41
End: 2021-02-18
Payer: MEDICAID

## 2021-02-18 DIAGNOSIS — G89.29 CHRONIC LOW BACK PAIN, UNSPECIFIED BACK PAIN LATERALITY, UNSPECIFIED WHETHER SCIATICA PRESENT: ICD-10-CM

## 2021-02-18 DIAGNOSIS — R20.2 PARESTHESIA: Primary | ICD-10-CM

## 2021-02-18 DIAGNOSIS — G43.111 INTRACTABLE MIGRAINE WITH AURA WITH STATUS MIGRAINOSUS: ICD-10-CM

## 2021-02-18 DIAGNOSIS — M54.50 CHRONIC LOW BACK PAIN, UNSPECIFIED BACK PAIN LATERALITY, UNSPECIFIED WHETHER SCIATICA PRESENT: ICD-10-CM

## 2021-02-18 PROCEDURE — 99215 OFFICE O/P EST HI 40 MIN: CPT | Performed by: PSYCHIATRY & NEUROLOGY

## 2021-02-18 NOTE — PROGRESS NOTES
Chief Complaint   Patient presents with    Follow-up     virtual visit--states had a seizure 3rd week of January    Migraine

## 2021-02-18 NOTE — LETTER
2/18/2021 9:46 AM 
 
Patient:  Vijay De Souza YOB: 1980 Date of Visit: 2/18/2021 Dear MD Zenaida Christie [address] [facility] [address] [phone] [address] In Basket: Thank you for referring Ms. Jamie Anders to me for evaluation/treatment. Below are the relevant portions of my assessment and plan of care. If you have questions, please do not hesitate to call me. I look forward to following Ms. Zuleyka Solares along with you. Sincerely, Anali Browning MD

## 2021-02-18 NOTE — PROGRESS NOTES
[facility] @ [facility] Visit Note    9283 Patient arrives for [place] protocol 3/3 without acute problems. Please see [facility] for complete assessment and education provided. Prior to treatment, patient was screened for COVID 19. Patient denies any signs or symptoms of COVID. Denies any known physical contact with anyone diagnosed with, or with pending or positive COVID test. Denies a pending or positive COVID test himself. Vital signs stable throughout and prior to discharge. Patient tolerated procedure well and was discharged without incident. Patient is aware of no further OPI appointments and will follow up with referring provider for any further concerns. Medications verified by Arcelia Wilson RN  via [employer]:    1. Reglan 10 mg IVP  2. Decadron 10 mg IVP  3. Benadryl 25 mg IVP  4. DHE-45 1 mg IVPB  5. Depacon 500 mg IV  6.   NS KVO    Vital signs:   Patient Vitals for the past 12 hrs:   Temp Pulse Resp BP SpO2   01/08/21 1318 98.9 °F (37.2 °C) (!) 56 16 129/81 97 %   01/08/21 1100     100 %   01/08/21 0917 99 °F (37.2 °C) (!) 58 16 120/74 100 % O-Z Flap Text: The defect edges were debeveled with a #15 scalpel blade.  Given the location of the defect, shape of the defect and the proximity to free margins an O-Z flap was deemed most appropriate.  Using a sterile surgical marker, an appropriate transposition flap was drawn incorporating the defect and placing the expected incisions within the relaxed skin tension lines where possible. The area thus outlined was incised deep to adipose tissue with a #15 scalpel blade.  The skin margins were undermined to an appropriate distance in all directions utilizing iris scissors.

## 2021-02-24 ENCOUNTER — HOSPITAL ENCOUNTER (OUTPATIENT)
Dept: NEUROLOGY | Age: 41
Discharge: HOME | End: 2021-02-24
Attending: PSYCHIATRY & NEUROLOGY
Payer: MEDICAID

## 2021-02-24 DIAGNOSIS — R56.9 SEIZURE-LIKE ACTIVITY (HCC): ICD-10-CM

## 2021-02-24 DIAGNOSIS — R20.2 PARESTHESIA: ICD-10-CM

## 2021-02-24 PROCEDURE — 95819 EEG AWAKE AND ASLEEP: CPT | Performed by: PSYCHIATRY & NEUROLOGY

## 2021-02-24 PROCEDURE — 95819 EEG AWAKE AND ASLEEP: CPT

## 2021-02-27 NOTE — PROCEDURES
[address]   EEG    Name:  Missael Romano  MR#:  [id number]  :  1980  ACCOUNT #:  [de-identified]  DATE OF SERVICE:  2021      REQUESTING PHYSICIAN:  Kavin Booth MD    HISTORY:  The patient is a 80-year-old female who is being evaluated after recent seizure-like activity. DESCRIPTION:  This is an 18-channel, 40-minute long EEG performed on an awake, drowsy, and asleep patient. The dominant posterior background rhythm consists of symmetric well-modulated medium voltage rhythms in the 8 Hz frequency range out of the posterior head region. Faster beta frequencies are seen in the frontal head regions. Drowsiness is characterized by slowing and vertex waves in the central area. Sleep spindles were also seen for a brief period. Deeper stages of sleep revealed generalized slowing in the delta and theta range. Photic stimulation elicits a symmetric driving response. Hyperventilation did not produce any abnormalities. EEG SUMMARY:  Normal study. CLINICAL INTERPRETATION:  This was a normal EEG during awake, drowsy, and asleep states of the patient. No lateralizing or epileptiform features were noted.         MD OZZIE [id number]  D:  2021 13:04  T:  2021 21:50  JOB #:  [id number]

## 2021-03-01 NOTE — PROGRESS NOTES
Received: Today     Call with results  Message Contents   Stephen Baker MD  [facility], April M, PEG  Pls inform patient EEG was normal.   3/1/21 Sent patient a [employer] message with results.   Kenneth Saunders LPN

## 2021-03-04 ENCOUNTER — OFFICE VISIT (OUTPATIENT)
Dept: NEUROLOGY | Age: 41
End: 2021-03-04

## 2021-03-04 DIAGNOSIS — R56.9 SEIZURE-LIKE ACTIVITY (HCC): Primary | ICD-10-CM

## 2021-03-05 ENCOUNTER — TELEPHONE (OUTPATIENT)
Dept: FAMILY MEDICINE CLINIC | Age: 41
End: 2021-03-05

## 2021-03-05 NOTE — TELEPHONE ENCOUNTER
----- Message from Bibi Samuels Heads sent at 3/3/2021 10:43 AM EST -----  Regarding: Dr. Astrid Blake Message/Vendor Calls    Caller's first and last name:      Reason for call: Requested a call back       Callback required yes/no and why: Yes      Best contact number(s): [phone]      Details to clarify the request: Patient stated that she in serve pain and she has a hard time sitting down and standing she said physical doesn't work and she would just like some others options.        April [address]efani Chambers

## 2021-03-05 NOTE — TELEPHONE ENCOUNTER
----- Message from Melo Adair sent at 3/5/2021 10:04 AM EST -----  Regarding: Dr. Lisa Fitzgerald: [phone]  General Message/Vendor Calls    Caller's first and last name: N/A      Reason for call: Physical Therapy      Callback required yes/no and why: Yes/Confirm      Best contact number(s):[phone]      Details to clarify the request: Patient says she has been doing physical therapy all her life and she would like the doctor to refer her to something else for her back. Patient sent a message over to the office on Tuesday and has not heard back from the doctor.         Melo Adair

## 2021-03-09 ENCOUNTER — TELEPHONE (OUTPATIENT)
Dept: FAMILY MEDICINE CLINIC | Age: 41
End: 2021-03-09

## 2021-03-09 ENCOUNTER — OFFICE VISIT (OUTPATIENT)
Dept: FAMILY MEDICINE CLINIC | Age: 41
End: 2021-03-09
Payer: MEDICAID

## 2021-03-09 DIAGNOSIS — M54.9 BACK PAIN, UNSPECIFIED BACK LOCATION, UNSPECIFIED BACK PAIN LATERALITY, UNSPECIFIED CHRONICITY: Primary | ICD-10-CM

## 2021-03-09 PROCEDURE — 99213 OFFICE O/P EST LOW 20 MIN: CPT | Performed by: FAMILY MEDICINE

## 2021-03-09 NOTE — PROGRESS NOTES
Boone Vaughn  36 y.o. female  1980  [place] [id number]  [id number]   [address]:    Telemedicine Progress Note  Jeniffer Sprague MD       Encounter Date and Time: March 10, 2021 at 4:38 PM    Consent: Boone Vaughn, who was seen by synchronous (real-time) audio-video technology, and/or her healthcare decision maker, is aware that this patient-initiated, Telehealth encounter on 3/9/2021 is a billable service, with coverage as determined by her insurance carrier. She is aware that she may receive a bill and has provided verbal consent to proceed: Yes. Chief Complaint   Patient presents with    Back Pain     History of Present Illness   Stew Hall is a 36 y.o. female was evaluated by synchronous (real-time) audio-video technology from home, through a secure patient portal.    Long hx of back pain and scoliosis. Has tried PT for years and usually helps for about 3 days. Has been seen at [facility] as well. Dry needle helped but did not last.  Pain is more in the middle of her spine. Never had injections before. At [facility] sees pain management. Does have some back pain that radiates to her feet. No improvement with Tylenol or NSAIDs. Review of Systems   ROS    Vitals/Objective:     General: alert, cooperative, no distress   Mental  status: mental status: alert, oriented to person, place, and time, normal mood, behavior, speech, dress, motor activity, and thought processes   Resp: resp: normal effort and no respiratory distress   Neuro: neuro: no gross deficits   Skin: skin: no discoloration or lesions of concern on visible areas   Due to this being a TeleHealth evaluation, many elements of the physical examination are unable to be assessed.       Assessment and Plan:   Time-based coding, delete if not needed: I spent at least 25 minutes with this established patient, and >50% of the time was spent counseling and/or coordinating care regarding chronic low back pain    1. Back pain, unspecified back location, unspecified back pain laterality, unspecified chronicity    Assessment/Plan: Patient does not want injection. Continue PT and follow up [facility] pain management. Time spent in direct conversation with the patient to include medical condition(s) discussed, assessment and treatment plan:       We discussed the expected course, resolution and complications of the diagnosis(es) in detail. Medication risks, benefits, costs, interactions, and alternatives were discussed as indicated. I advised her to contact the office if her condition worsens, changes or fails to improve as anticipated. She expressed understanding with the diagnosis(es) and plan. Patient understands that this encounter was a temporary measure, and the importance of further follow up and examination was emphasized. Patient verbalized understanding. Patient informed to follow up: Follow-up and Dispositions  ·   Return if symptoms worsen or fail to improve. Electronically Signed: Libra Mccarthy MD    CPT Codes 44287-45916 for Established Patients may apply to this Telehealth Visit. POS code: 18. Modifier GT    Stew Shaver is a 36 y.o. female who was evaluated by an audio-video encounter for concerns as above. Patient identification was verified prior to start of the visit. A caregiver was present when appropriate. Due to this being a TeleHealth encounter (During [facility]- public health emergency), evaluation of the following organ systems was limited: Vitals/Constitutional/EENT/Resp/CV/GI//MS/Neuro/Skin/Heme-Lymph-Imm.   Pursuant to the emergency declaration under the [facility] [place], 1135 waiver authority and the [employer] and [employer] Act, this Virtual Visit was conducted, with patient's (and/or legal guardian's) consent, to reduce the patient's risk of exposure to COVID-19 and provide necessary medical care. Services were provided through a synchronous discussion virtually to substitute for in-person clinic visit. I was at home. The patient was at home. History   Patients past medical, surgical and family histories were reviewed and updated. Past Medical History:   Diagnosis Date    Anxiety     Arthritis     Depression     Headache     Ill-defined condition     scoliosis, gall stones, anemia    Lyme disease     Scoliosis     Seizures ([facility] [address]      Past Surgical History:   Procedure Laterality Date    HX  SECTION      x 4    HX CHOLECYSTECTOMY  2017    HX HERNIA REPAIR      HX OTHER SURGICAL      PIC line placed for Lymes     HX TUBAL LIGATION       Family History   Problem Relation Age of Onset   24 [facility] Arthritis-rheumatoid Mother     Depression Mother     Depression Father     Diabetes Father     Cancer Maternal Aunt     Cancer Maternal Uncle     Cancer Paternal Aunt     Cancer Paternal Uncle     Heart Attack Maternal Grandmother     Cancer Maternal Grandfather      Social History     Tobacco Use    Smoking status: Never Smoker    Smokeless tobacco: Never Used   Substance Use Topics    Alcohol use: No    Drug use: No     Patient Active Problem List   Diagnosis Code    Severe obesity ([facility] [address] E66.01    Intractable migraine with aura with status migrainosus G43. 111    Chronic anterior uveitis of right eye H20.11    Uveitis H20.9    Fibromyalgia M79.7    Scoliosis M41.9          Current Medications/Allergies   Medications and Allergies reviewed:    Current Outpatient Medications   Medication Sig Dispense Refill    montelukast (Singulair) 10 mg tablet       tizanidine HCl (TIZANIDINE PO)       venlafaxine (EFFEXOR) 25 mg tablet 25 mg = 1 tab each dose, PO, tid, # 90 tab, 1 Refills, Pharmacy: [facility]/pharmacy #[id number]     galcanezumab-gnlm (Emgality Pen) 120 mg/mL injection 1 mL by SubCUTAneous route every thirty (30) days.  1 Syringe 5    Diclofenac Potassium (Cambia) 50 mg pwpk Take 50 mg by mouth as needed (for migraines). 2 Packet 0    lidocaine (Lidoderm) 5 % 3 Patches by TransDERmal route every twenty-four (24) hours. Apply patch to the affected area for 12 hours a day and remove for 12 hours a day. 30 Each 0    methocarbamoL (Robaxin-750) 750 mg tablet Take 1 Tab by mouth four (4) times daily. As needed for muscle spasm  Indications: muscle spasm 20 Tab 0    predniSONE (DELTASONE) 10 mg tablet Take with breakfast daily on a taper: 6 tabs PO for 3 days then 4 tabs PO for 3 days then 2 tabs PO for 3 days then 1 tab PO for 3 days 39 Tab 0    milnacipran (SAVELLA) 25 mg tablet Take  by mouth.  nortriptyline (PAMELOR) 25 mg capsule Take  by mouth nightly.  promethazine-codeine (PHENERGAN WITH CODEINE) 6.25-10 mg/5 mL syrup Take  by mouth four (4) times daily as needed for Cough.  benzonatate (TESSALON) 100 mg capsule Take 100 mg by mouth three (3) times daily as needed for Cough.  DULoxetine (CYMBALTA) 30 mg capsule Take 1 Cap by mouth daily. 90 Cap 1    albuterol (PROVENTIL VENTOLIN) 2.5 mg /3 mL (0.083 %) nebulizer solution by Nebulization route every four (4) hours as needed.        Allergies   Allergen Reactions    Other Food Swelling     All nuts except peanuts    Morphine Rash     Rash on back and legs    Tramadol Nausea and Vomiting    Doxycycline Nausea Only    Nut - Unspecified Swelling    [place] Swelling     Swollen lips

## 2021-03-12 NOTE — PROCEDURES
EEG:      Date:  03/04/21    Requesting Physician:  David Faulkner MD    A sleep deprived EEG is requested in this 36year old lady with seizure-like activity to evaluate for epileptiform abnormality. Medications:  Medications are said to include Effexor, Cambia, prednisone, Savella, Cymbalta. This tracing is obtained during the awake, drowsy and sleeping states. During wakefulness there are very brief intermittent runs of posteriorly-dominant and symmetric low-to-medium amplitude 9 cycle per second activities which attenuate with eye opening. Lower-voltage faster-frequency activities are seen symmetrically over the anterior head regions. Hyperventilation is not performed secondary to COVID-19 precautions. Intermittent photic stimulation induces symmetric posterior driving responses. Stage 2 sleep is attained. Interpretation:   This sleep deprived EEG recorded during the awake, drowsy and sleeping states is normal.

## 2021-03-13 ENCOUNTER — TELEPHONE (OUTPATIENT)
Dept: FAMILY MEDICINE CLINIC | Age: 41
End: 2021-03-13

## 2021-03-13 NOTE — TELEPHONE ENCOUNTER
Called patient after receiving message. Answered all questions. States she will follow up with Pain Management specialist at [facility] for chronic pain.

## 2021-05-20 DIAGNOSIS — G43.111 INTRACTABLE MIGRAINE WITH AURA WITH STATUS MIGRAINOSUS: Primary | ICD-10-CM

## 2021-05-20 NOTE — TELEPHONE ENCOUNTER
----- Message from Anabel Beatty sent at 5/20/2021 12:32 PM EDT -----  Regarding: /Telephone  Caller's first and last name and relationship (if not the patient): n/a     Best contact number(s):[phone]    Whose call is being returned:  pt     Details to clarify the request: Calling to choose medication , told to call back by the office. Also would like to know if she is due for her shot ? Walk in

## 2021-05-24 RX ORDER — RIMEGEPANT SULFATE 75 MG/75MG
TABLET, ORALLY DISINTEGRATING ORAL
Qty: 8 TABLET | Refills: 3 | Status: SHIPPED | OUTPATIENT
Start: 2021-05-24 | End: 2022-07-24 | Stop reason: SDUPTHER

## 2021-08-18 ENCOUNTER — PATIENT MESSAGE (OUTPATIENT)
Dept: NEUROLOGY | Age: 41
End: 2021-08-18

## 2021-08-19 NOTE — TELEPHONE ENCOUNTER
Contacted pt regarding concern about medication. Pt states she is currently in the [facility] ER. Informed pt that per Dr. Elisabeth Mckeon to defer care to the ER and to schedule a follow up with Dr. Elisabeth Mckeon afterwards.

## 2021-08-19 NOTE — TELEPHONE ENCOUNTER
Steve Cortez RN 8/19/2021 8:04 AM EDT        ----- Message -----  From: Rasheed Litter: 8/18/2021 5:43 AM EDT  To: Shanell Garcia [place]  Subject: Prescription Question     Is the new prescription, controls the pressure in my head? For two days, I have alot of pressure in the forehead area, left side light pressure and right side so much pressure of the head. It is affecting my eye sight again. What should I do? The emgalify causes pressure in my head after the third shot, where I was nauseated, slept all day, and I was not able to eat because of the smell.

## 2021-10-13 ENCOUNTER — PATIENT MESSAGE (OUTPATIENT)
Dept: NEUROLOGY | Age: 41
End: 2021-10-13

## 2021-10-13 DIAGNOSIS — R41.3 MEMORY DIFFICULTY: Primary | ICD-10-CM

## 2021-10-13 NOTE — TELEPHONE ENCOUNTER
Joby Archibald LPN 96/47/5804 1:00 AM EDT      ----- Message -----  From: Figueroa Ham  Sent: 10/13/2021 7:34 AM EDT  To: Gwen [facility] Nurse Pool  Subject: Non-Urgent Medical Question     Good morning  I must have short term memory loss. I basically can't remember if I talk to someone yesterday or two days ago about anything important for work or personal life. My mind will totally forget. Everyday, I remembered what is important but forget the details on that contain for the day. My thoughts will go scramble until it's time to sleep. Then I will have nightmares and my anxiety will be on my mind. I am not able to concentrate on what I supposed to do. It messes with all my conscious and I am not able to think straight. After typing this and you have read this, I won't remember because when you called today or next couple days or so. Please help.

## 2021-10-20 ENCOUNTER — TELEPHONE (OUTPATIENT)
Dept: NEUROLOGY | Age: 41
End: 2021-10-20

## 2021-10-20 NOTE — TELEPHONE ENCOUNTER
----- Message from Joselin Hernandez sent at 10/20/2021 11:43 AM EDT -----  Regarding: Kaleighnda/Telephone  Contact: [phone]  General Message/Vendor Calls    Caller's first and last name: n/a. Reason for call: PT wants to know who will be contacting her for MRI. Also, wants to know where she is supposed to go for lab work. Callback required yes/no and why: Yes, to confirm. Best contact number(s): [phone]. Details to clarify the request: n/a.       Joselin Hernandez

## 2021-11-01 ENCOUNTER — HOSPITAL ENCOUNTER (EMERGENCY)
Age: 41
Discharge: HOME | End: 2021-11-01
Attending: STUDENT IN AN ORGANIZED HEALTH CARE EDUCATION/TRAINING PROGRAM | Admitting: STUDENT IN AN ORGANIZED HEALTH CARE EDUCATION/TRAINING PROGRAM
Payer: MEDICAID

## 2021-11-01 VITALS
HEIGHT: 66 IN | TEMPERATURE: 97.1 F | DIASTOLIC BLOOD PRESSURE: 81 MMHG | BODY MASS INDEX: 41.46 KG/M2 | HEART RATE: 67 BPM | RESPIRATION RATE: 16 BRPM | WEIGHT: 258 LBS | SYSTOLIC BLOOD PRESSURE: 127 MMHG

## 2021-11-01 DIAGNOSIS — G43.809 OTHER MIGRAINE WITHOUT STATUS MIGRAINOSUS, NOT INTRACTABLE: Primary | ICD-10-CM

## 2021-11-01 PROCEDURE — 96372 THER/PROPH/DIAG INJ SC/IM: CPT

## 2021-11-01 PROCEDURE — 74011250636 HC RX REV CODE- 250/636: Performed by: STUDENT IN AN ORGANIZED HEALTH CARE EDUCATION/TRAINING PROGRAM

## 2021-11-01 PROCEDURE — 99282 EMERGENCY DEPT VISIT SF MDM: CPT

## 2021-11-01 PROCEDURE — 74011250637 HC RX REV CODE- 250/637: Performed by: STUDENT IN AN ORGANIZED HEALTH CARE EDUCATION/TRAINING PROGRAM

## 2021-11-01 RX ORDER — DEXAMETHASONE SODIUM PHOSPHATE 10 MG/ML
10 INJECTION INTRAMUSCULAR; INTRAVENOUS ONCE
Status: COMPLETED | OUTPATIENT
Start: 2021-11-01 | End: 2021-11-01

## 2021-11-01 RX ORDER — ONDANSETRON 4 MG/1
8 TABLET, ORALLY DISINTEGRATING ORAL
Status: COMPLETED | OUTPATIENT
Start: 2021-11-01 | End: 2021-11-01

## 2021-11-01 RX ORDER — DIPHENHYDRAMINE HCL 25 MG
25 CAPSULE ORAL
Status: COMPLETED | OUTPATIENT
Start: 2021-11-01 | End: 2021-11-01

## 2021-11-01 RX ORDER — KETOROLAC TROMETHAMINE 30 MG/ML
30 INJECTION, SOLUTION INTRAMUSCULAR; INTRAVENOUS
Status: COMPLETED | OUTPATIENT
Start: 2021-11-01 | End: 2021-11-01

## 2021-11-01 RX ADMIN — KETOROLAC TROMETHAMINE 30 MG: 30 INJECTION, SOLUTION INTRAMUSCULAR; INTRAVENOUS at 16:43

## 2021-11-01 RX ADMIN — ONDANSETRON 8 MG: 4 TABLET, ORALLY DISINTEGRATING ORAL at 16:43

## 2021-11-01 RX ADMIN — DIPHENHYDRAMINE HYDROCHLORIDE 25 MG: 25 CAPSULE ORAL at 16:43

## 2021-11-01 RX ADMIN — DEXAMETHASONE SODIUM PHOSPHATE 10 MG: 10 INJECTION INTRAMUSCULAR; INTRAVENOUS at 16:44

## 2021-11-01 NOTE — ED PROVIDER NOTES
Patient is a 36year old female with history of migraine headaches, anxiety, depression, scoliosis, seizures who presents to ED c/o migraine headache which has been ongoing for years. States this migraine has been present for at least the past month. Patient reports she was scheduled to have a MRI 3 days ago but she felt sick so she missed her appointment. Reports she has not yet rescheduled this appointment. Reports headache feels like her typical migraine and has not been relieved with any medications she is prescribed. States she is followed by Dr. Sherald Saint. Denies any fever, chills, visual changes, neck pain, numbness or weakness, facial asymmetry, confusion, nausea, vomiting.              Past Medical History:   Diagnosis Date    Anxiety     Arthritis     Depression     Headache     Ill-defined condition     scoliosis, gall stones, anemia    Lyme disease     Scoliosis     Seizures (HCC)        Past Surgical History:   Procedure Laterality Date    HX  SECTION      x 4    HX CHOLECYSTECTOMY  2017    HX HERNIA REPAIR      HX OTHER SURGICAL      PIC line placed for Lymes     HX TUBAL LIGATION           Family History:   Problem Relation Age of Onset    Arthritis-rheumatoid Mother     Depression Mother     Depression Father     Diabetes Father     Cancer Maternal Aunt     Cancer Maternal Uncle     Cancer Paternal Aunt     Cancer Paternal Uncle     Heart Attack Maternal Grandmother     Cancer Maternal Grandfather        Social History     Socioeconomic History    Marital status: SINGLE     Spouse name: Not on file    Number of children: Not on file    Years of education: Not on file    Highest education level: Not on file   Occupational History    Not on file   Tobacco Use    Smoking status: Never Smoker    Smokeless tobacco: Never Used   Vaping Use    Vaping Use: Never used   Substance and Sexual Activity    Alcohol use: No    Drug use: Yes     Types: Marijuana    Sexual activity: Not Currently   Other Topics Concern    Not on file   Social History Narrative    Not on file     Social Determinants of Health     Financial Resource Strain:     Difficulty of Paying Living Expenses:    Food Insecurity:     Worried About Running Out of Food in the Last Year:     [address] in the Last Year:    Transportation Needs:     Lack of Transportation (Medical):  Lack of Transportation (Non-Medical):    Physical Activity:     Days of Exercise per Week:     Minutes of Exercise per Session:    Stress:     Feeling of Stress :    Social Connections:     Frequency of Communication with Friends and Family:     Frequency of Social Gatherings with Friends and Family:     Attends Worship Services:     Active Member of Clubs or Organizations:     Attends Club or Organization Meetings:     Marital Status:    Intimate Partner Violence:     Fear of Current or Ex-Partner:     Emotionally Abused:     Physically Abused:     Sexually Abused: ALLERGIES: Other food, Morphine, Tramadol, Doxycycline, Nut - unspecified, and [place]    Review of Systems   Constitutional: Negative for activity change, appetite change, chills and fever. HENT: Negative for congestion and sore throat. Eyes: Negative for pain and visual disturbance. Respiratory: Negative for cough and shortness of breath. Cardiovascular: Negative for chest pain, palpitations and leg swelling. Gastrointestinal: Negative for abdominal distention, abdominal pain, constipation, diarrhea, nausea and vomiting. Genitourinary: Negative for decreased urine volume, dysuria, flank pain, frequency and urgency. Musculoskeletal: Negative for back pain and neck pain. Skin: Negative for rash and wound. Allergic/Immunologic: Negative for immunocompromised state. Neurological: Positive for headaches. Negative for dizziness, syncope, weakness, light-headedness and numbness.    Psychiatric/Behavioral: Negative for confusion. All other systems reviewed and are negative. Vitals:    11/01/21 1553   BP: 127/81   Pulse: 67   Resp: 16   Temp: 97.1 °F (36.2 °C)   Weight: 117 kg (258 lb)   Height: 5' 6\" (1.676 m)            Physical Exam  Vitals and nursing note reviewed. Constitutional:       General: She is not in acute distress. Appearance: Normal appearance. She is well-developed. She is not toxic-appearing. HENT:      Head: Normocephalic and atraumatic. Nose: Nose normal.      Mouth/Throat:      Mouth: Mucous membranes are moist.   Eyes:      General: Lids are normal.      Extraocular Movements: Extraocular movements intact. Conjunctiva/sclera: Conjunctivae normal.      Pupils: Pupils are equal, round, and reactive to light. Cardiovascular:      Rate and Rhythm: Normal rate and regular rhythm. Pulses: Normal pulses. Heart sounds: Normal heart sounds, S1 normal and S2 normal.   Pulmonary:      Effort: Pulmonary effort is normal. No accessory muscle usage. Breath sounds: Normal breath sounds. Abdominal:      Palpations: Abdomen is soft. Musculoskeletal:         General: Normal range of motion. Cervical back: Normal range of motion and neck supple. Skin:     General: Skin is warm and dry. Capillary Refill: Capillary refill takes less than 2 seconds. Neurological:      General: No focal deficit present. Mental Status: She is alert and oriented to person, place, and time. Mental status is at baseline. Cranial Nerves: No cranial nerve deficit. Sensory: No sensory deficit. Motor: No weakness. Gait: Gait normal.   Psychiatric:         Attention and Perception: Attention normal.         Mood and Affect: Mood and affect normal.         Speech: Speech normal.         Behavior: Behavior is cooperative. Thought Content:  Thought content normal.         Cognition and Memory: Cognition normal.         Judgment: Judgment normal.          MDM  Number of Diagnoses or Management Options  Other migraine without status migrainosus, not intractable  Diagnosis management comments: Patient with history of migraine headaches - this HA has been present for at least the past month. No focal neuro deficits on exam. She was given migraine cocktail in ED with improvement of sx. She was requesting MRI be performed today because she missed the scheduled appointment 3 days ago. Advised MRI is not indicated at this time and to follow-up with Dr. Raffy Souza. Return to ER warnings discussed in detail.         Amount and/or Complexity of Data Reviewed  Review and summarize past medical records: yes  Discuss the patient with other providers: yes (Dr. Manuel Adorno, ED Attending )           Procedures

## 2021-11-01 NOTE — ED NOTES
Triage note: pt with complaints of headache for the past month. Pt was scheduled for MRI this past Friday and was unable to make appointment. Neurology - Dr. General Singleton.

## 2021-11-03 ENCOUNTER — TELEPHONE (OUTPATIENT)
Dept: NEUROLOGY | Age: 41
End: 2021-11-03

## 2021-11-03 NOTE — TELEPHONE ENCOUNTER
----- Message from Star Jane 2906 sent at 11/3/2021 10:00 AM EDT -----  Regarding: dr walsh/ telephone  General Message/Vendor Calls    Caller's first and last name: pt      Reason for call: she would like a call from the nurse.  She isnt feeling well and is having headaches that are affecting her to the point where she cant remember what shes supposed to be doing       Callback required yes/no and why: yes       Best contact number(s): [phone]      Details to clarify the request:      Star Jane [id number]

## 2021-11-04 NOTE — TELEPHONE ENCOUNTER
Contacted pt regarding missed phone call. Pt states she is having severe headaches affecting her memory. States she is nauseated, dizzy and is having issues remembering simple things.  Has appointment for MRI tomorrow

## 2021-11-05 ENCOUNTER — HOSPITAL ENCOUNTER (OUTPATIENT)
Dept: MRI IMAGING | Age: 41
Discharge: HOME | End: 2021-11-05
Attending: PSYCHIATRY & NEUROLOGY
Payer: MEDICAID

## 2021-11-05 DIAGNOSIS — R41.3 MEMORY DIFFICULTY: ICD-10-CM

## 2021-11-05 PROCEDURE — A9575 INJ GADOTERATE MEGLUMI 0.1ML: HCPCS | Performed by: RADIOLOGY

## 2021-11-05 PROCEDURE — 70553 MRI BRAIN STEM W/O & W/DYE: CPT

## 2021-11-05 PROCEDURE — 74011250636 HC RX REV CODE- 250/636: Performed by: RADIOLOGY

## 2021-11-05 RX ORDER — GADOTERATE MEGLUMINE 376.9 MG/ML
20 INJECTION INTRAVENOUS
Status: COMPLETED | OUTPATIENT
Start: 2021-11-05 | End: 2021-11-05

## 2021-11-05 RX ADMIN — GADOTERATE MEGLUMINE 20 ML: 376.9 INJECTION, SOLUTION INTRAVENOUS at 20:10

## 2021-11-09 ENCOUNTER — PATIENT MESSAGE (OUTPATIENT)
Dept: NEUROLOGY | Age: 41
End: 2021-11-09

## 2021-11-10 ENCOUNTER — OFFICE VISIT (OUTPATIENT)
Dept: NEUROLOGY | Age: 41
End: 2021-11-10
Payer: MEDICAID

## 2021-11-10 VITALS
HEART RATE: 89 BPM | SYSTOLIC BLOOD PRESSURE: 110 MMHG | OXYGEN SATURATION: 98 % | BODY MASS INDEX: 41.3 KG/M2 | TEMPERATURE: 97 F | DIASTOLIC BLOOD PRESSURE: 70 MMHG | WEIGHT: 255.9 LBS

## 2021-11-10 DIAGNOSIS — G43.111 INTRACTABLE MIGRAINE WITH AURA WITH STATUS MIGRAINOSUS: Primary | ICD-10-CM

## 2021-11-10 PROCEDURE — 99215 OFFICE O/P EST HI 40 MIN: CPT | Performed by: PSYCHIATRY & NEUROLOGY

## 2021-11-10 RX ORDER — DICLOFENAC POTASSIUM 50 MG/1
TABLET, FILM COATED ORAL
Qty: 30 TABLET | Refills: 2 | Status: SHIPPED | OUTPATIENT
Start: 2021-11-10

## 2021-11-10 RX ORDER — PREGABALIN 75 MG/1
75 CAPSULE ORAL DAILY
COMMUNITY

## 2021-11-10 RX ORDER — ROPINIROLE 0.5 MG/1
0.5 TABLET, FILM COATED ORAL DAILY
COMMUNITY

## 2021-11-10 RX ORDER — LANOLIN ALCOHOL/MO/W.PET/CERES
400 CREAM (GRAM) TOPICAL DAILY
COMMUNITY

## 2021-11-10 NOTE — PROGRESS NOTES
Neurology Progress Note    Patient ID:  Sherri Astudillo  [id number]  80 y.o.  1980      Subjective:   History:  Kaitlyn Drake a  female who  has a past medical history of Arthritis; Depression; Headache; and Seizures ([facility]). who in Sept 2018, noted blurred vision of the R eye, described as there is a film in the eyes and severe headache R frontal and R temple area, R periorbital, shooting to back of head, occurring every day, throbbing, constant, 8/10, no aggravating/relivieng factor with nausea and photophobia. Considerations include migraine, with visual auras, intractable, hemicrania continua and pseudotumor cerebri. Patient saw Dr Judy Edwards (ophtalmologist) who said everything was fine. Patient was placed on Propranolol and Wellbutrin with no clear benefit. Tried Amitriptyline which helped in the past.  Patient was seen at ER with MRI brain showing non-specific white matter changes. Spinal tap was unremarkable. Patient still has chronic low back pain now seeing Dr Savannah Mohan, Pain management at [facility] on Effexor and Tizanidine which helps her sleep.     3 months ago, patient noted constant pain of the whole face, described as throbbing and tingling. Insurance will not cover Emgality so has been off the medication for 2 months. Since then, noted daily headaches.  Aubrey Dunn still works.        Recent tests done:  Sleep deprived EEG (3/12/21) recorded during the awake, drowsy and sleeping states is normal.    ROS:  Per HPI-  Otherwise the remainder of ROS was negative    Social Hx  Social History     Socioeconomic History    Marital status: SINGLE   Tobacco Use    Smoking status: Never Smoker    Smokeless tobacco: Never Used   Vaping Use    Vaping Use: Never used   Substance and Sexual Activity    Alcohol use: No    Drug use: Yes     Types: Marijuana    Sexual activity: Not Currently       Meds:  Current Outpatient Medications on File Prior to Visit   Medication Sig Dispense Refill    pregabalin (Lyrica) 75 mg capsule Take 75 mg by mouth daily.  magnesium oxide (MAG-OX) 400 mg tablet Take 400 mg by mouth daily.  rOPINIRole (REQUIP) 0.5 mg tablet Take 0.5 mg by mouth daily.  rimegepant (Nurtec ODT) 75 mg disintegrating tablet Let one tab dissolve under tongue at HA onset. Max dose one tab in 24 hours. 8 Tablet 3    montelukast (Singulair) 10 mg tablet       galcanezumab-gnlm (Emgality Pen) 120 mg/mL injection 1 mL by SubCUTAneous route every thirty (30) days. 1 Syringe 5    Diclofenac Potassium (Cambia) 50 mg pwpk Take 50 mg by mouth as needed (for migraines). 2 Packet 0    albuterol (PROVENTIL VENTOLIN) 2.5 mg /3 mL (0.083 %) nebulizer solution by Nebulization route every four (4) hours as needed.  tizanidine HCl (TIZANIDINE PO)  (Patient not taking: Reported on 11/10/2021)      venlafaxine (EFFEXOR) 25 mg tablet 25 mg = 1 tab each dose, PO, tid, # 90 tab, 1 Refills, Pharmacy: [facility]/pharmacy #[id number] (Patient not taking: Reported on 11/10/2021)      lidocaine (Lidoderm) 5 % 3 Patches by TransDERmal route every twenty-four (24) hours. Apply patch to the affected area for 12 hours a day and remove for 12 hours a day. (Patient not taking: Reported on 11/10/2021) 30 Each 0    methocarbamoL (Robaxin-750) 750 mg tablet Take 1 Tab by mouth four (4) times daily. As needed for muscle spasm  Indications: muscle spasm (Patient not taking: Reported on 11/10/2021) 20 Tab 0    predniSONE (DELTASONE) 10 mg tablet Take with breakfast daily on a taper: 6 tabs PO for 3 days then 4 tabs PO for 3 days then 2 tabs PO for 3 days then 1 tab PO for 3 days (Patient not taking: Reported on 11/10/2021) 39 Tab 0    milnacipran (SAVELLA) 25 mg tablet Take  by mouth. (Patient not taking: Reported on 11/10/2021)      nortriptyline (PAMELOR) 25 mg capsule Take  by mouth nightly.  (Patient not taking: Reported on 11/10/2021)      promethazine-codeine (PHENERGAN WITH CODEINE) 6.25-10 mg/5 mL syrup Take  by mouth four (4) times daily as needed for Cough. (Patient not taking: Reported on 11/10/2021)      benzonatate (TESSALON) 100 mg capsule Take 100 mg by mouth three (3) times daily as needed for Cough. (Patient not taking: Reported on 11/10/2021)      DULoxetine (CYMBALTA) 30 mg capsule Take 1 Cap by mouth daily. (Patient not taking: Reported on 11/10/2021) 90 Cap 1     No current facility-administered medications on file prior to visit. Imaging:    CT Results (recent):  Results from Hospital Encounter encounter on 10/15/19    CT ABD PELV W CONT    Narrative  EXAM: CT ABD PELV W CONT    INDICATION: right sided abdominal pain    COMPARISON: None    CONTRAST: 100 mL of Isovue-370. TECHNIQUE:  Following the uneventful intravenous administration of contrast, thin axial  images were obtained through the abdomen and pelvis. Coronal and sagittal  reconstructions were generated. Oral contrast was not administered. CT dose  reduction was achieved through use of a standardized protocol tailored for this  examination and automatic exposure control for dose modulation. FINDINGS:  LUNG BASES: Clear. INCIDENTALLY IMAGED HEART AND MEDIASTINUM: Unremarkable. LIVER: No biliary dilatation. Right hepatic macrolobulated 11 mm structure  (series 2, image 14) with density measurement of the 3 compatible with a simple  cyst.  GALLBLADDER: Unremarkable. SPLEEN: No mass. PANCREAS: No mass or ductal dilatation. ADRENALS: Unremarkable. KIDNEYS: No calculus, or hydronephrosis. Right upper renal 1 cm hypodensity  (image coronal 76 with density of 30 HU  STOMACH: Unremarkable. SMALL BOWEL: No dilatation or wall thickening. COLON: No dilatation or wall thickening. APPENDIX: Unremarkable. Coronal image 43). PERITONEUM: Small amount of complex free fluid in the cul-de-sac. RETROPERITONEUM: No lymphadenopathy or aortic aneurysm. REPRODUCTIVE ORGANS: Normal uterus.  Right ovarian cyst measures 3.9 x 2.8 cm and  has a density measurement of 17 HU. URINARY BLADDER: No mass or calculus. BONES: No destructive bone lesion. Levoconvex mid lumbar scoliosis  ADDITIONAL COMMENTS: Fat-containing umbilical hernia with neck measurement of 7  mm in outer diameter of 18 mm. Superimposed on a diastases. Impression  IMPRESSION:  Small amount of complex free fluid. Right ovarian complex cyst.  Normal appendix. Right renal hypodensity, too's small to further characterize. Ultrasound  characterization is recommended. MRI Results (recent):  Results from [place] encounter on 11/05/21    MRI BRAIN W WO CONT    Narrative  Clinical history: Memory difficulty  INDICATION:   Memory difficulty    COMPARISON: 9/14/2018  TECHNIQUE: MR examination of the brain includes axial and sagittal T1 , axial  T2, axial FLAIR, axial gradient echo, axial DWI, coronal T1 . Pre and post  contrast axial T1-weighted imaging. Postcontrast T1-weighted imaging coronal  plane. CONTRAST: 20 ml ProHance  FINDINGS:  There is no intracranial mass, hemorrhage or acute infarction. Pontomesencephalic ratio is within normal limits. Parieto-occipital sulcus is  within normal limits. Few scattered nonspecific foci of subcortical increased T2  signal intensity are demonstrated. No postcontrast enhancement or diffusion  restriction. There is no abnormal parenchymal enhancement. There is no abnormal  meningeal enhancement demonstrated. The brain architecture is normal. There is  no evidence of midline shift or mass-effect. The ventricles are normal in size,  position and configuration. There are no extra-axial fluid collections. Major  intracranial vascular flow-voids are unremarkable. The orbits are grossly  symmetric. Dural venous sinuses are grossly unremarkable. There is no Chiari or  syrinx. Pituitary and infundibulum grossly unremarkable. Cerebellopontine angles  are unremarkable. No skull base mass is identified. Cavernous sinuses are  symmetric.  The mastoid air cells and are well pneumatized and clear. Impression  Few stable scattered foci of subcortical increased T2 signal intensity. There is no acute intracranial process. No intracranial mass, hemorrhage or evidence of acute infarction. IR Results (recent):  No results found for this or any previous visit. VAS/US Results (recent):  Results from Hospital Encounter encounter on 10/31/18    DUPLEX LOWER EXT VENOUS RIGHT    Narrative  [facility]   FINAL REPORT   Name: Gael Roberts  MRN: [id number]    Outpatient  : 1980  HIS Order #: [id number]  [id number] [facility] Visit #: [id number]  Date: 31 Oct 2018    TYPE OF TEST: Peripheral Venous Testing    REASON FOR TEST  Pain in limb    Right Leg:-  Deep venous thrombosis:           No  Superficial venous thrombosis:    No  Deep venous insufficiency:        No  Superficial venous insufficiency: No      INTERPRETATION/FINDINGS  PROCEDURE:  RIGHT LOWER EXTREMITY  VENOUS DUPLEX. Evaluation of lower  extremity veins with ultrasound (B-mode imaging, pulsed Doppler, color  Doppler). Includes the common femoral, deep femoral, femoral,  popliteal, posterior tibial, peroneal, and great saphenous veins. Other veins, for example the gastrocnemius and soleal veins, may also  be visualized. FINDINGS: Harmon Cuna scale and color flow duplex images of the veins in the  right lower extremity demonstrate normal compressibility, spontaneous  and augmented flow profiles, and absence of filling defects throughout  the deep and superficial veins in the right lower extremity. CONCLUSION:Right lower extremity venous duplex negative for deep  venous thrombosis or thrombophlebitis. Left common femoral vein is  thrombus free. ADDITIONAL COMMENTS    I have personally reviewed the data relevant to the interpretation of  this  study. TECHNOLOGIST: Vesna Thakkar  Signed: 10/31/2018 04:41 PM    PHYSICIAN: Kaleb Hooper.  Peter Combs MD  Signed: 2018 12:31 PM      Reviewed records in [employer] and media tab today    Lab Review     No visits with results within 3 Month(s) from this visit. Latest known visit with results is:   Admission on 01/03/2021, Discharged on 01/03/2021   Component Date Value Ref Range Status    WBC 01/03/2021 8.4  3.6 - 11.0 K/uL Final    RBC 01/03/2021 4.25  3.80 - 5.20 M/uL Final    HGB 01/03/2021 11.9  11.5 - 16.0 g/dL Final    HCT 01/03/2021 37.7  35.0 - 47.0 % Final    MCV 01/03/2021 88.7  80.0 - 99.0 FL Final    MCH 01/03/2021 28.0  26.0 - 34.0 PG Final    MCHC 01/03/2021 31.6  30.0 - 36.5 g/dL Final    RDW 01/03/2021 13.8  11.5 - 14.5 % Final    PLATELET 67/96/3456 459  150 - 400 K/uL Final    MPV 01/03/2021 10.2  8.9 - 12.9 FL Final    NRBC 01/03/2021 0.0  0  WBC Final    ABSOLUTE NRBC 01/03/2021 0.00  0.00 - 0.01 K/uL Final    NEUTROPHILS 01/03/2021 66  32 - 75 % Final    LYMPHOCYTES 01/03/2021 27  12 - 49 % Final    MONOCYTES 01/03/2021 5  5 - 13 % Final    EOSINOPHILS 01/03/2021 1  0 - 7 % Final    BASOPHILS 01/03/2021 1  0 - 1 % Final    IMMATURE GRANULOCYTES 01/03/2021 0  0.0 - 0.5 % Final    ABS. NEUTROPHILS 01/03/2021 5.6  1.8 - 8.0 K/UL Final    ABS. LYMPHOCYTES 01/03/2021 2.3  0.8 - 3.5 K/UL Final    ABS. MONOCYTES 01/03/2021 0.4  0.0 - 1.0 K/UL Final    ABS. EOSINOPHILS 01/03/2021 0.1  0.0 - 0.4 K/UL Final    ABS. BASOPHILS 01/03/2021 0.0  0.0 - 0.1 K/UL Final    ABS. IMM.  GRANS. 01/03/2021 0.0  0.00 - 0.04 K/UL Final    DF 01/03/2021 AUTOMATED    Final    Sodium 01/03/2021 140  136 - 145 mmol/L Final    Potassium 01/03/2021 3.4* 3.5 - 5.1 mmol/L Final    Chloride 01/03/2021 109* 97 - 108 mmol/L Final    CO2 01/03/2021 27  21 - 32 mmol/L Final    Anion gap 01/03/2021 4* 5 - 15 mmol/L Final    Glucose 01/03/2021 77  65 - 100 mg/dL Final    BUN 01/03/2021 14  6 - 20 MG/DL Final    Creatinine 01/03/2021 1.03* 0.55 - 1.02 MG/DL Final    BUN/Creatinine ratio 01/03/2021 14  12 - 20   Final    GFR est AA 01/03/2021 >60  >60 ml/min/1.73m2 Final    GFR est non-AA 01/03/2021 59* >60 ml/min/1.73m2 Final    Estimated GFR is calculated using the IDMS-traceable Modification of Diet in Renal Disease (MDRD) Study equation, reported for both  Americans (GFRAA) and non- Americans (GFRNA), and normalized to 1.73m2 body surface area. The physician must decide which value applies to the patient.  Calcium 01/03/2021 9.1  8.5 - 10.1 MG/DL Final    SAMPLES BEING HELD 01/03/2021 NO TUBES ON HOLD   Final    COMMENT 01/03/2021 Add-on orders for these samples will be processed based on acceptable specimen integrity and analyte stability, which may vary by analyte. Final         Objective:       Exam:  Visit Vitals  /70 (BP 1 Location: Right arm, BP Patient Position: Sitting, BP Cuff Size: Large adult)   Pulse 89   Temp 97 °F (36.1 °C) (Temporal)   Wt 116.1 kg (255 lb 14.4 oz)   SpO2 98%   BMI 41.30 kg/m²     Gen: Awake, alert, follows commands  Appropriate appearance, normal speech. Oriented to all spheres. No visual field defect on confrontation exam.  Full eyes movement, with no nystagmus, no diplopia, no ptosis. Normal gag and swallow. All remaining cranial nerves were normal  Motor function: 5/5 in all extremities  Sensory: intact to LT, PP and JPS  DTRs ++ in all extremities, (-) Babinski  Good FTN and HTS   Gait: Normal    Assessment:       ICD-10-CM ICD-9-CM    1. Intractable migraine with aura with status migrainosus  G43. 111 346.03      MRI Brain : WNL    Prior spinal tap (2019): unremarkable    Plan:   1. Discussed sleep deprived EEG was unremarkable  2. I personally reviewed the MRI brain images. Discussed with the patient that it was unremarkable  3. Since insurance will not cover Emgality, and patient getting daily migraine, will start her on Qulipta 60 mg PO every day for migraine prevention  3. Switch to Cataflam 50 mg prn to abort headache.  (Triptans relatively contraindicated due to patient already on SSRI  4. Continue headache diary and list that can trigger headache (more likely her working nights is contributing to worsening headaches)  5. Advise to see another pain management (marian previous doctor at [facility] has retired) regarding getting MRI lumbar spine and addressing lower back issues  6. Depending on above, will consider Gabapentin for facial pain and lower back pain      Follow-up and Dispositions    · Return in about 2 months (around 1/10/2022).                Steve Royal MD  Diplomate, American Board of Psychiatry and Neurology  Diplomate, Neuromuscular Medicine  Diplomate, American Board of Electrodiagnostic Medicine

## 2021-11-10 NOTE — LETTER
11/10/2021    Patient: Hermes Moya   YOB: 1980   Date of Visit: 11/10/2021     Fernando Larios NP  [address] [facility] [address]  Via Fax: [phone]    Dear Fernando Larios NP,      Thank you for referring Ms. Sonal Pacheco to [facility] for evaluation. My notes for this consultation are attached. If you have questions, please do not hesitate to call me. I look forward to following your patient along with you.       Sincerely,    Guy Malik MD

## 2021-11-10 NOTE — PROGRESS NOTES
Chief Complaint   Patient presents with    Follow-up     Follow up MRI results     Visit Vitals  /70 (BP 1 Location: Right arm, BP Patient Position: Sitting, BP Cuff Size: Large adult)   Pulse 89   Temp 97 °F (36.1 °C) (Temporal)   Wt 116.1 kg (255 lb 14.4 oz)   SpO2 98%   BMI 41.30 kg/m²

## 2021-11-12 ENCOUNTER — TELEPHONE (OUTPATIENT)
Dept: NEUROLOGY | Age: 41
End: 2021-11-12

## 2022-03-18 PROBLEM — M79.7 FIBROMYALGIA: Status: ACTIVE | Noted: 2019-06-07

## 2022-03-18 PROBLEM — E66.01 SEVERE OBESITY (HCC): Status: ACTIVE | Noted: 2018-10-15

## 2022-03-18 PROBLEM — M41.9 SCOLIOSIS: Status: ACTIVE | Noted: 2020-08-10

## 2022-03-19 PROBLEM — H20.11 CHRONIC ANTERIOR UVEITIS OF RIGHT EYE: Status: ACTIVE | Noted: 2018-12-12

## 2022-03-19 PROBLEM — H20.9 UVEITIS: Status: ACTIVE | Noted: 2019-01-24

## 2022-03-19 PROBLEM — G43.111 INTRACTABLE MIGRAINE WITH AURA WITH STATUS MIGRAINOSUS: Status: ACTIVE | Noted: 2018-10-15

## 2022-04-07 NOTE — PROGRESS NOTES
Vitamin B12 and celiac panel normal What Type Of Note Output Would You Prefer (Optional)?: Standard Output Hpi Title: Evaluation of Skin Lesions How Severe Are Your Spot(S)?: mild Have Your Spot(S) Been Treated In The Past?: has not been treated

## 2022-06-27 ENCOUNTER — TELEPHONE (OUTPATIENT)
Dept: NEUROLOGY | Age: 42
End: 2022-06-27

## 2022-06-27 NOTE — TELEPHONE ENCOUNTER
Patient calling stating she's having pain through her entire body and she's unable to sit up and walk. Please call.

## 2022-06-28 NOTE — TELEPHONE ENCOUNTER
Called pt. Verified. Pt states that she is having a sharp pain in lower back. 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 Pt states that the pain is triggering her headaches and muscle pain.  Asking pt if she has been to the emergency room and she states that's she went to [facility] and they recommend her to call her neurologist.

## 2022-06-30 NOTE — TELEPHONE ENCOUNTER
Called pt. Verified. Informed pt that Dr. Marsha Lord does not do pain management and he recommends that she contact her insurance to get list of pain management within her network. Pt verbalizes understanding. Pt also states that she COVID.

## 2022-07-24 DIAGNOSIS — G43.111 INTRACTABLE MIGRAINE WITH AURA WITH STATUS MIGRAINOSUS: ICD-10-CM

## 2022-07-25 RX ORDER — RIMEGEPANT SULFATE 75 MG/75MG
TABLET, ORALLY DISINTEGRATING ORAL
Qty: 8 TABLET | Refills: 3 | Status: SHIPPED | OUTPATIENT
Start: 2022-07-25

## 2022-09-10 ENCOUNTER — TELEPHONE (OUTPATIENT)
Dept: FAMILY MEDICINE CLINIC | Age: 42
End: 2022-09-10

## 2022-09-10 NOTE — TELEPHONE ENCOUNTER
----- Message from Catherine Morse sent at 9/8/2022 10:27 AM EDT -----  Subject: Message to Provider    QUESTIONS  Information for Provider? pt saw Dr. Lester Sands in 2020 but does not have   her listed as her PCP and she needs an appointment with her.   ---------------------------------------------------------------------------  --------------  Gladys Srivastava AHHH  [phone]; OK to leave message on voicemail  ---------------------------------------------------------------------------  --------------  SCRIPT ANSWERS  undefined

## 2022-10-24 ENCOUNTER — HOSPITAL ENCOUNTER (EMERGENCY)
Age: 42
Discharge: HOME | End: 2022-10-24
Attending: STUDENT IN AN ORGANIZED HEALTH CARE EDUCATION/TRAINING PROGRAM
Payer: COMMERCIAL

## 2022-10-24 ENCOUNTER — APPOINTMENT (OUTPATIENT)
Dept: CT IMAGING | Age: 42
End: 2022-10-24
Attending: STUDENT IN AN ORGANIZED HEALTH CARE EDUCATION/TRAINING PROGRAM
Payer: COMMERCIAL

## 2022-10-24 VITALS
WEIGHT: 256 LBS | TEMPERATURE: 98.7 F | HEIGHT: 66 IN | SYSTOLIC BLOOD PRESSURE: 152 MMHG | BODY MASS INDEX: 41.14 KG/M2 | OXYGEN SATURATION: 98 % | HEART RATE: 61 BPM | DIASTOLIC BLOOD PRESSURE: 83 MMHG | RESPIRATION RATE: 18 BRPM

## 2022-10-24 DIAGNOSIS — S16.1XXA STRAIN OF NECK MUSCLE, INITIAL ENCOUNTER: ICD-10-CM
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
## 2022-10-24 PROCEDURE — 96372 THER/PROPH/DIAG INJ SC/IM: CPT

## 2022-10-24 PROCEDURE — 72128 CT CHEST SPINE W/O DYE: CPT

## 2022-10-24 PROCEDURE — 74011250637 HC RX REV CODE- 250/637: Performed by: STUDENT IN AN ORGANIZED HEALTH CARE EDUCATION/TRAINING PROGRAM

## 2022-10-24 PROCEDURE — 72125 CT NECK SPINE W/O DYE: CPT

## 2022-10-24 PROCEDURE — 99284 EMERGENCY DEPT VISIT MOD MDM: CPT

## 2022-10-24 PROCEDURE — 74011250636 HC RX REV CODE- 250/636: Performed by: STUDENT IN AN ORGANIZED HEALTH CARE EDUCATION/TRAINING PROGRAM

## 2022-10-24 PROCEDURE — 72131 CT LUMBAR SPINE W/O DYE: CPT

## 2022-10-24 RX ORDER — CYCLOBENZAPRINE HCL 10 MG
10 TABLET ORAL
Qty: 12 TABLET | Refills: 0 | Status: SHIPPED | OUTPATIENT
Start: 2022-10-24 | End: 2022-11-14

## 2022-10-24 RX ORDER — KETOROLAC TROMETHAMINE 30 MG/ML
30 INJECTION, SOLUTION INTRAMUSCULAR; INTRAVENOUS ONCE
Status: COMPLETED | OUTPATIENT
Start: 2022-10-24 | End: 2022-10-24

## 2022-10-24 RX ORDER — ACETAMINOPHEN 500 MG
1000 TABLET ORAL ONCE
Status: COMPLETED | OUTPATIENT
Start: 2022-10-24 | End: 2022-10-24

## 2022-10-24 RX ORDER — CYCLOBENZAPRINE HCL 10 MG
10 TABLET ORAL
Status: COMPLETED | OUTPATIENT
Start: 2022-10-24 | End: 2022-10-24

## 2022-10-24 RX ADMIN — ACETAMINOPHEN 1000 MG: 500 TABLET ORAL at 21:11

## 2022-10-24 RX ADMIN — KETOROLAC TROMETHAMINE 30 MG: 30 INJECTION, SOLUTION INTRAMUSCULAR at 21:10

## 2022-10-24 RX ADMIN — CYCLOBENZAPRINE 10 MG: 10 TABLET, FILM COATED ORAL at 21:11
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
## 2022-10-25 NOTE — DISCHARGE INSTRUCTIONS
Alternate Tylenol and ibuprofen every 4 hours for around-the-clock pain control. Use muscle relaxant as prescribed. Do not drive or operate machinery if using muscle relaxant. Apply heat, perform gentle stretching, take hot baths and showers to help with your aches and pains.
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
## 2022-10-25 NOTE — ED NOTES
Pt given discharge instructions, patient education, 1 prescriptions, and follow up information. Pt verbalizes understanding. All questions answered. Patient discharged to home in private vehicle, ambulatory. Pt A&Ox4, RA, pain controlled.

## 2022-11-14 ENCOUNTER — OFFICE VISIT (OUTPATIENT)
Dept: PRIMARY CARE CLINIC | Age: 42
End: 2022-11-14
Payer: COMMERCIAL

## 2022-11-14 VITALS
SYSTOLIC BLOOD PRESSURE: 117 MMHG | DIASTOLIC BLOOD PRESSURE: 81 MMHG | OXYGEN SATURATION: 97 % | TEMPERATURE: 97.3 F | BODY MASS INDEX: 41.46 KG/M2 | WEIGHT: 258 LBS | RESPIRATION RATE: 18 BRPM | HEART RATE: 71 BPM | HEIGHT: 66 IN

## 2022-11-14 DIAGNOSIS — G89.29 CHRONIC BILATERAL LOW BACK PAIN WITHOUT SCIATICA: Primary | ICD-10-CM

## 2022-11-14 DIAGNOSIS — Z11.3 SCREEN FOR SEXUALLY TRANSMITTED DISEASES: ICD-10-CM

## 2022-11-14 DIAGNOSIS — M54.50 CHRONIC BILATERAL LOW BACK PAIN WITHOUT SCIATICA: Primary | ICD-10-CM

## 2022-11-14 DIAGNOSIS — Z13.220 SCREENING FOR HYPERLIPIDEMIA: ICD-10-CM

## 2022-11-14 DIAGNOSIS — M41.20 OTHER IDIOPATHIC SCOLIOSIS, UNSPECIFIED SPINAL REGION: ICD-10-CM

## 2022-11-14 DIAGNOSIS — Z23 ENCOUNTER FOR IMMUNIZATION: ICD-10-CM

## 2022-11-14 DIAGNOSIS — M79.7 FIBROMYALGIA: ICD-10-CM

## 2022-11-14 PROCEDURE — 99204 OFFICE O/P NEW MOD 45 MIN: CPT | Performed by: FAMILY MEDICINE

## 2022-11-14 RX ORDER — DICLOFENAC SODIUM 50 MG/1
50 TABLET, DELAYED RELEASE ORAL 2 TIMES DAILY
Qty: 60 TABLET | Refills: 1 | Status: SHIPPED | OUTPATIENT
Start: 2022-11-14

## 2022-11-14 RX ORDER — CYCLOBENZAPRINE HCL 10 MG
10 TABLET ORAL
Qty: 45 TABLET | Refills: 1 | Status: SHIPPED | OUTPATIENT
Start: 2022-11-14

## 2022-11-14 RX ORDER — HYDROXYZINE 25 MG/1
TABLET, FILM COATED ORAL
COMMUNITY
Start: 2022-01-07

## 2022-11-14 RX ORDER — ESCITALOPRAM OXALATE 10 MG/1
TABLET ORAL
COMMUNITY
Start: 2022-07-01
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
## 2023-01-09 ENCOUNTER — APPOINTMENT (OUTPATIENT)
Dept: CT IMAGING | Age: 43
End: 2023-01-09
Attending: FAMILY MEDICINE
Payer: COMMERCIAL

## 2023-01-09 ENCOUNTER — HOSPITAL ENCOUNTER (EMERGENCY)
Age: 43
Discharge: HOME | End: 2023-01-09
Attending: STUDENT IN AN ORGANIZED HEALTH CARE EDUCATION/TRAINING PROGRAM
Payer: COMMERCIAL

## 2023-01-09 VITALS
DIASTOLIC BLOOD PRESSURE: 92 MMHG | WEIGHT: 275 LBS | RESPIRATION RATE: 20 BRPM | HEART RATE: 67 BPM | OXYGEN SATURATION: 97 % | SYSTOLIC BLOOD PRESSURE: 132 MMHG | BODY MASS INDEX: 44.2 KG/M2 | HEIGHT: 66 IN | TEMPERATURE: 99.6 F

## 2023-01-09 DIAGNOSIS — K08.89 PAIN, DENTAL: Primary | ICD-10-CM

## 2023-01-09 DIAGNOSIS — R22.0 RIGHT FACIAL SWELLING: ICD-10-CM

## 2023-01-09 LAB
ALBUMIN SERPL-MCNC: 3.9 G/DL (ref 3.5–5.2)
ALBUMIN/GLOB SERPL: 1.1 (ref 1.1–2.2)
ALP SERPL-CCNC: 67 U/L (ref 35–104)
ALT SERPL-CCNC: 17 U/L (ref 10–35)
ANION GAP SERPL CALC-SCNC: 9 MMOL/L (ref 5–15)
AST SERPL-CCNC: 16 U/L (ref 10–35)
BASOPHILS # BLD: 0 K/UL (ref 0–1)
BASOPHILS NFR BLD: 0 % (ref 0–1)
BILIRUB SERPL-MCNC: 0.2 MG/DL (ref 0.2–1)
BUN SERPL-MCNC: 11 MG/DL (ref 6–20)
BUN/CREAT SERPL: 14 (ref 12–20)
CALCIUM SERPL-MCNC: 8.8 MG/DL (ref 8.6–10)
CHLORIDE SERPL-SCNC: 104 MMOL/L (ref 98–107)
CO2 SERPL-SCNC: 26 MMOL/L (ref 22–29)
CREAT SERPL-MCNC: 0.79 MG/DL (ref 0.5–0.9)
DIFFERENTIAL METHOD BLD: ABNORMAL
EOSINOPHIL # BLD: 0.1 K/UL (ref 0–0.4)
EOSINOPHIL NFR BLD: 2 %
ERYTHROCYTE [DISTWIDTH] IN BLOOD BY AUTOMATED COUNT: 14.3 % (ref 11.5–14.5)
GLOBULIN SER CALC-MCNC: 3.5 G/DL (ref 2–4)
GLUCOSE SERPL-MCNC: 75 MG/DL (ref 65–100)
HCT VFR BLD AUTO: 37 % (ref 35–47)
HGB BLD-MCNC: 12.1 G/DL (ref 11.5–16)
IMM GRANULOCYTES # BLD AUTO: 0 K/UL (ref 0–0.04)
IMM GRANULOCYTES NFR BLD AUTO: 0 % (ref 0–0.5)
LYMPHOCYTES # BLD: 2.4 K/UL (ref 0.8–3.5)
LYMPHOCYTES NFR BLD: 35 % (ref 12–49)
MCH RBC QN AUTO: 28.9 PG (ref 26–34)
MCHC RBC AUTO-ENTMCNC: 32.7 G/DL (ref 30–36.5)
MCV RBC AUTO: 88.5 FL (ref 80–99)
MONOCYTES # BLD: 0.6 K/UL (ref 0–1)
MONOCYTES NFR BLD: 8 % (ref 5–13)
NEUTS SEG # BLD: 3.7 K/UL (ref 1.8–8)
NEUTS SEG NFR BLD: 55 % (ref 32–75)
NRBC # BLD: 0 K/UL (ref 0–0.01)
NRBC BLD-RTO: 0 PER 100 WBC
PLATELET # BLD AUTO: 314 K/UL (ref 150–400)
PMV BLD AUTO: 9.5 FL (ref 8.9–12.9)
POTASSIUM SERPL-SCNC: 3.7 MMOL/L (ref 3.5–5.1)
PROT SERPL-MCNC: 7.4 G/DL (ref 6.4–8.3)
RBC # BLD AUTO: 4.18 M/UL (ref 3.8–5.2)
SODIUM SERPL-SCNC: 139 MMOL/L (ref 136–145)
WBC # BLD AUTO: 6.8 K/UL (ref 3.6–11)

## 2023-01-09 PROCEDURE — 70487 CT MAXILLOFACIAL W/DYE: CPT

## 2023-01-09 PROCEDURE — 36415 COLL VENOUS BLD VENIPUNCTURE: CPT

## 2023-01-09 PROCEDURE — 96374 THER/PROPH/DIAG INJ IV PUSH: CPT

## 2023-01-09 PROCEDURE — 99285 EMERGENCY DEPT VISIT HI MDM: CPT

## 2023-01-09 PROCEDURE — 74011250636 HC RX REV CODE- 250/636: Performed by: FAMILY MEDICINE

## 2023-01-09 PROCEDURE — 80053 COMPREHEN METABOLIC PANEL: CPT

## 2023-01-09 PROCEDURE — 74011000636 HC RX REV CODE- 636: Performed by: STUDENT IN AN ORGANIZED HEALTH CARE EDUCATION/TRAINING PROGRAM

## 2023-01-09 PROCEDURE — 85025 COMPLETE CBC W/AUTO DIFF WBC: CPT

## 2023-01-09 RX ORDER — KETOROLAC TROMETHAMINE 30 MG/ML
15 INJECTION, SOLUTION INTRAMUSCULAR; INTRAVENOUS
Status: COMPLETED | OUTPATIENT
Start: 2023-01-09 | End: 2023-01-09

## 2023-01-09 RX ORDER — CLINDAMYCIN HYDROCHLORIDE 300 MG/1
300 CAPSULE ORAL 4 TIMES DAILY
Qty: 28 CAPSULE | Refills: 0 | Status: SHIPPED | OUTPATIENT
Start: 2023-01-09 | End: 2023-01-16

## 2023-01-09 RX ADMIN — KETOROLAC TROMETHAMINE 15 MG: 30 INJECTION, SOLUTION INTRAMUSCULAR at 13:41

## 2023-01-09 RX ADMIN — IOPAMIDOL 100 ML: 755 INJECTION, SOLUTION INTRAVENOUS at 14:15

## 2023-01-09 NOTE — DISCHARGE INSTRUCTIONS
Thank you for allowing us to provide you with medical care today. We realize that you have many choices for your emergency care needs. We thank you for choosing [facility]. Please choose us in the future for any continued health care needs. The exam and treatment you received in the emergency department were for an emergent problem and are not intended as complete care. It is important that you follow-up with a doctor. If your symptoms worsen or you do not improve you should return to the emergency department. We are available 24 hours a day. Please make an appointment with your health care provider for follow-up of your emergency department visit. Take this sheet with you when you go to your follow-up visit.

## 2023-01-09 NOTE — ED PROVIDER NOTES
Patient is a 55-year-old female with past medical history of obesity, anxiety, arthritis, depression, seizures presenting for evaluation of right upper canine dental pain and right-sided facial swelling. Reports that in general, she has poor dentition. She has been following with a dentist.  Noted some pain to her right upper canine a few days ago. Woke up this morning with severe right-sided facial pain and swelling. Does not think that she has had a fever at home. Taking acetaminophen without symptom relief. No other complaints.   She did call her dentist, who recommended she come to the emergency department to get checked out first.           Past Medical History:   Diagnosis Date    Anxiety     Arthritis     Depression     Headache     Ill-defined condition     scoliosis, gall stones, anemia    Lyme disease     Scoliosis     Seizures ([facility] [address]        Past Surgical History:   Procedure Laterality Date    HX  SECTION      x 4    HX CHOLECYSTECTOMY  2017    HX HERNIA REPAIR      HX OTHER SURGICAL      PIC line placed for Lymes     HX TUBAL LIGATION           Family History:   Problem Relation Age of Onset    Arthritis-rheumatoid Mother     Depression Mother     Depression Father     Diabetes Father     Cancer Maternal Aunt     Cancer Maternal Uncle     Cancer Paternal Aunt     Cancer Paternal Uncle     Heart Attack Maternal Grandmother     Cancer Maternal Grandfather        Social History     Socioeconomic History    Marital status: SINGLE     Spouse name: Not on file    Number of children: Not on file    Years of education: Not on file    Highest education level: Not on file   Occupational History    Not on file   Tobacco Use    Smoking status: Never    Smokeless tobacco: Never   Vaping Use    Vaping Use: Never used   Substance and Sexual Activity    Alcohol use: No    Drug use: Yes     Types: Marijuana    Sexual activity: Not Currently   Other Topics Concern    Not on file   Social History Narrative Not on file     Social Determinants of Health     Financial Resource Strain: High Risk    Difficulty of Paying Living Expenses: Very hard   Food Insecurity: Food Insecurity Present    Worried About Running Out of Food in the Last Year: Often true    Ran Out of Food in the Last Year: Sometimes true   Transportation Needs: Not on file   Physical Activity: Insufficiently Active    Days of Exercise per Week: 3 days    Minutes of Exercise per Session: 30 min   Stress: Not on file   Social Connections: Not on file   Intimate Partner Violence: Not At Risk    Fear of Current or Ex-Partner: No    Emotionally Abused: No    Physically Abused: No    Sexually Abused: No   Housing Stability: Not on file         ALLERGIES: Other food, Morphine, Tramadol, Doxycycline, Nut - unspecified, and [place]    Review of Systems   Constitutional:  Negative for fever and unexpected weight change. HENT:  Positive for dental problem. Negative for congestion. Eyes:  Negative for visual disturbance. Respiratory:  Negative for cough, chest tightness and shortness of breath. Cardiovascular:  Negative for chest pain and palpitations. Gastrointestinal:  Negative for abdominal pain, diarrhea, nausea and vomiting. Endocrine: Negative for polyuria. Genitourinary:  Negative for dysuria and flank pain. Musculoskeletal:  Negative for back pain. Skin:  Negative for color change. Allergic/Immunologic: Negative for immunocompromised state. Neurological:  Negative for dizziness and headaches. Hematological:  Negative for adenopathy. Psychiatric/Behavioral:  Negative for agitation. Vitals:    01/09/23 1302   BP: (!) 132/92   Pulse: 67   Resp: 20   Temp: 99.6 °F (37.6 °C)   SpO2: 97%   Weight: 124.7 kg (275 lb)   Height: 5' 6\" (1.676 m)            Physical Exam  Vitals and nursing note reviewed. Constitutional:       General: She is not in acute distress. Appearance: Normal appearance. She is normal weight.    HENT: Head: Atraumatic. Comments: Significant right sided facial swelling extending from right upper lip to right eye lid. No erythema or heat     Mouth/Throat:     Eyes:      Extraocular Movements: Extraocular movements intact. Conjunctiva/sclera: Conjunctivae normal.      Pupils: Pupils are equal, round, and reactive to light. Cardiovascular:      Rate and Rhythm: Normal rate. Pulmonary:      Effort: Pulmonary effort is normal. No respiratory distress. Abdominal:      General: Abdomen is flat. Musculoskeletal:         General: Normal range of motion. Cervical back: Neck supple. Skin:     General: Skin is warm and dry. Capillary Refill: Capillary refill takes less than 2 seconds. Neurological:      General: No focal deficit present. Mental Status: She is alert and oriented to person, place, and time. Mental status is at baseline. Psychiatric:         Mood and Affect: Mood normal.         Behavior: Behavior normal.        Medical Decision Making  Patient presenting with dental pain, right-sided facial swelling. Facial swelling seems to be he cannot on exam.  She has a low-grade fever today of 99.6 °F.  Concerning for abscess. Plan to obtain CBC to evaluate for leukocytosis, CMP to evaluate kidney function, CT maxillofacial with contrast to evaluate for abscess. 1500 -out any leukocytosis. CT interpreted by myself and radiologist revealing soft tissue swelling, but no evidence of abscess. Plan to cover patient with oral clindamycin. Close follow-up with dentist.  Discussed my clinical impression(s), any labs and/or radiology results with the patient. I answered any questions and addressed any concerns. Discussed the importance of following up with their primary care physician and/or specialist(s). Discussed signs or symptoms that would warrant return back to the ER for further evaluation. The patient is agreeable with discharge.     Amount and/or Complexity of Data Reviewed  Labs: ordered. Decision-making details documented in ED Course. Radiology: ordered and independent interpretation performed. Decision-making details documented in ED Course. Risk  Prescription drug management.            Procedures

## 2023-01-09 NOTE — ED NOTES
Discharge instructions reviewed with pt. Opportunity for questions provided. Pt leaves ambulatory with steady gait.

## 2023-01-09 NOTE — Clinical Note
[address]  [facility] EMERGENCY DEPT  [address] [id number]  [phone]    Work/School Note    Date: 1/9/2023    To Whom It May concern:      Deya Vargas was seen and treated today in the emergency room by the following provider(s):  Attending Provider: Kat Spangler DO  Nurse Practitioner: Mahesh Garcia NP. Deya Vargas is excused from work/school on 01/09/23. She is clear to return to work/school on 01/10/23.         Sincerely,          Ananya Coker NP

## 2023-01-09 NOTE — ED TRIAGE NOTES
Patient arrives with c/o right sided facial pain and swelling, with dental pain on right upper tooth. States hx of dental pain, but swelling and facial pain began two days ago. Denies N/V, fever.

## 2023-03-06 RX ORDER — CYCLOBENZAPRINE HCL 10 MG
TABLET ORAL
Qty: 45 TABLET | Refills: 1 | Status: SHIPPED | OUTPATIENT
Start: 2023-03-06

## 2023-04-19 DIAGNOSIS — G43.111 INTRACTABLE MIGRAINE WITH AURA WITH STATUS MIGRAINOSUS: ICD-10-CM

## 2023-04-24 RX ORDER — RIMEGEPANT SULFATE 75 MG/75MG
TABLET, ORALLY DISINTEGRATING ORAL
Qty: 8 TABLET | Refills: 3 | OUTPATIENT
Start: 2023-04-24

## 2023-04-24 NOTE — TELEPHONE ENCOUNTER
Called pt. Verified. Inform pt that a follow up appt is needed for refills last seen in clinic 2021. Pt verbalizes understanding.

## 2023-06-27 ENCOUNTER — PATIENT MESSAGE (OUTPATIENT)
Age: 43
End: 2023-06-27

## 2023-06-30 RX ORDER — RIMEGEPANT SULFATE 75 MG/75MG
TABLET, ORALLY DISINTEGRATING ORAL
Qty: 8 TABLET | Refills: 0 | Status: SHIPPED | OUTPATIENT
Start: 2023-06-30

## 2023-08-01 NOTE — PATIENT INSTRUCTIONS
Initial SW/CM Assessment/Plan of Care Note     Baseline Assessment  67 year old admitted 7/25/2023 as Inpatient with a diagnosis of Covid, Atrial Fibrillation with RVR.    Prior to admission patient was living with Other facility residents and residing at  [facility].  Patient does not  have a Power of  for Healthcare.  Document is not activated.  Agent is  SDM spouse,  Rich Buckley.   Patient’s Primary Care Provider is PcpNoemí.  SNF facility MD    Progress Note  Met with patients  Rich Buckley, for assessment of discharge needs, Patients  stated patient is a long term resident at [facility], however he does not want her to return and wants an alternate facility.   This CM informed Mr. Buckley, additional referrals have been sent for an alternate long term care facility and are still pending.   Patient is bed bound at baseline  As a result of multiple stroke since 2017. Patient has a tracheostomy and a PEG tube for nutrition. She is NPO at baseline.   Patient received Bolus feedings via Peg tube, Glucerna 1.2 Bolus 400ml  Every 6 hours. With 250ml water flushes, q 6 hours.   Oxygen requirements at NH, are 6 liter via tracheostomy.       Plan  Patient/Family Discharge Goal: SNF   stated patient cannot return to [facility] and is requesting an alternate facility. SW sent referrals to alternate facilities, acceptance pending.   Is patient/family goal achievable: Yes    SW/CM - Recommendations for Discharge: SNF   PT - Recommendations for Discharge:      Last Filed Values     None        OT - Recommendations for Discharge:      Last Filed Values     None        SLP - Recommendations for Discharge:    Last Filed Values     None          Barriers to Discharge  Identified Barriers to Discharge/Transition Planning:       Anticipate patient will need post-hospital services. Necessary services are available.  Anticipate patient can return to the environment from which  [facility]  [address], [address] [facility]   [address]  [address]  [address]  [place], [address] [facility]  [address]  [address] [facility]   [address], [address]  Phone: [phone]  Fax: [address]  Phone: [phone]  Fax: [phone]    Consider Cymbalta for depression and fibromyalgia patient entered the hospital.   Anticipate patient cannot provide self-care at discharge.    Refer to SW/CM Flowsheet for objective data.     Medical History  Past Medical History:   Diagnosis Date   • Cerebral infarction (CMD)    • Essential (primary) hypertension        Prior to Admission Status  Functional Status  Ambulation:  (Bed Bound at [place])  Bathing: Facility Staff  Dressing: Facility Staff  Toileting: Facility staff  Meal Preparation: Facility Staff  Medication Preparation: Pharmacy Setup  Medication Administration: Staff Administered  Housekeeping: Other (comment) (Facility staff)    Agency/Support  Type of Services Prior to Hospitalization: Nurse (specify), Safety alert service/button   Support Systems: Family members, Spouse   Home Devices/Equipment: Hospital bed, Mobility assist device, Tube feeding equipment/pump, Trach or stoma supplies (T), Ventilator (T) ,   ,    ,      Mobility Assist Devices: Ceiling lift, Total lift  Sensory Support Devices: None      Current Status  PT Ambulation Tips:    PT Transfer Tips:     OT Bathing Tips:    OT Dressing Tips:    OT Toileting Tips:    OT Feeding Tips:    SLP Swallow/Feeding Tips:    SLP Comm/Cog Tips:    Current Mental Status: Lethargic  Stressors:      Insurance  Primary: [facility] [employer] PLAN  Secondary: N/A    Disposition Recommendations:  SW/CM recommendation for discharge: SNF  SW working on placement for Alternate facility.     CM dept following for needs & referrals.     SW/CM  Will continue to follow for discharge needs.  Angela Dotson RN CM

## 2023-08-08 RX ORDER — CYCLOBENZAPRINE HCL 10 MG
TABLET ORAL
Qty: 45 TABLET | Refills: 1 | OUTPATIENT
Start: 2023-08-08

## 2023-08-08 NOTE — TELEPHONE ENCOUNTER
PCP: Tejinder Orozco DO    Last Visit 11/14/2022   Future Appointments   Date Time Provider [address]   9/14/2023  9:40 AM Joshua Pantoja MD [id number] AMB       Requested Prescriptions     Pending Prescriptions Disp Refills    cyclobenzaprine (FLEXERIL) 10 MG tablet [Pharmacy Med Name: CYCLOBENZAPRINE 10 MG TABLET] 45 tablet 1     Sig: TAKE 1 TABLET BY MOUTH THREE TIMES A DAY AS NEEDED FOR MUSCLE SPASM         Other Comments: Last Refill 06/13/2023

## 2023-08-10 RX ORDER — RIMEGEPANT SULFATE 75 MG/75MG
TABLET, ORALLY DISINTEGRATING ORAL
Qty: 8 TABLET | Refills: 0 | OUTPATIENT
Start: 2023-08-10

## 2023-08-11 ENCOUNTER — TELEPHONE (OUTPATIENT)
Age: 43
End: 2023-08-11

## 2023-08-11 NOTE — TELEPHONE ENCOUNTER
Patient is returning nurses call from yesterday regarding her prescription Nurtec.     Please contact

## 2023-08-11 NOTE — TELEPHONE ENCOUNTER
RE:Nurte  Key: [id number]    [facility] request, added and submitted via CMM    Response via CMM      Eligibility could not be verified for this patient - patient not found. Please review patient information and re-submit.     Sent Soko message to patient

## 2023-09-08 ENCOUNTER — TELEPHONE (OUTPATIENT)
Age: 43
End: 2023-09-08

## 2023-09-08 NOTE — TELEPHONE ENCOUNTER
Called pt. No answer left message on VM to call office. Calling to inform pt appt scheduled with Dr. mIan Pina for 9/14/23 at 9:40AM needs to be rescheduled regarding incorrect time slot. Appt scheduled for 9/14/23 at 9:40AM is canceled.

## 2023-09-11 ENCOUNTER — TELEPHONE (OUTPATIENT)
Age: 43
End: 2023-09-11

## 2023-09-13 NOTE — TELEPHONE ENCOUNTER
Called pt.  verified. Inform pt her appt scheduled with Dr. Riley Weller on 23 at 9:40AM was scheduled incorrectly and that Dr. Riley Weller does not have 9:40AM time slot on his procedure day. Pt verbalizes understanding. Pt did not want to proceed with scheduling a follow up at this time. Pt states that she will call office tomorrow to see if her insurance is accepted at the clinic.

## 2023-09-18 ENCOUNTER — TELEPHONE (OUTPATIENT)
Age: 43
End: 2023-09-18

## 2023-09-19 NOTE — TELEPHONE ENCOUNTER
Called pt. [employer] verified. Pt states that she was inform that we no longer take her insurance. Inform pt to contact her insurance company to see who is network. Pt verbalizes understanding.

## 2023-10-13 RX ORDER — CYCLOBENZAPRINE HCL 10 MG
TABLET ORAL
Qty: 45 TABLET | Refills: 0 | Status: SHIPPED | OUTPATIENT
Start: 2023-10-13

## 2023-11-20 ENCOUNTER — OFFICE VISIT (OUTPATIENT)
Age: 43
End: 2023-11-20
Payer: MEDICAID

## 2023-11-20 VITALS
DIASTOLIC BLOOD PRESSURE: 74 MMHG | OXYGEN SATURATION: 98 % | RESPIRATION RATE: 20 BRPM | HEART RATE: 62 BPM | SYSTOLIC BLOOD PRESSURE: 130 MMHG

## 2023-11-20 DIAGNOSIS — G43.111 INTRACTABLE MIGRAINE WITH AURA WITH STATUS MIGRAINOSUS: Primary | ICD-10-CM

## 2023-11-20 PROCEDURE — 99214 OFFICE O/P EST MOD 30 MIN: CPT

## 2023-11-20 RX ORDER — DICLOFENAC POTASSIUM 50 MG/1
50 POWDER, FOR SOLUTION ORAL PRN
Qty: 9 EACH | Refills: 5 | Status: SHIPPED | OUTPATIENT
Start: 2023-11-20

## 2023-11-20 RX ORDER — BUSPIRONE HYDROCHLORIDE 10 MG/1
20 TABLET ORAL 3 TIMES DAILY
COMMUNITY
Start: 2023-10-23

## 2023-11-20 RX ORDER — RIMEGEPANT SULFATE 75 MG/75MG
75 TABLET, ORALLY DISINTEGRATING ORAL EVERY OTHER DAY
Qty: 16 TABLET | Refills: 5 | Status: SHIPPED | OUTPATIENT
Start: 2023-11-20

## 2023-11-20 RX ORDER — BUDESONIDE AND FORMOTEROL FUMARATE DIHYDRATE 80; 4.5 UG/1; UG/1
AEROSOL RESPIRATORY (INHALATION)
COMMUNITY

## 2023-11-20 NOTE — PROGRESS NOTES
Whole body aches   Headaches- Lucila [place] is what she wants to try   Since July it feels like pressure in the back of her head   Almost in the morning every day around 2 am it wakes her up out of her sleep
no bruits, no pain with resistance to active range of motion. Musculoskeletal: Extremities revealed no edema and had full range of motion of joints. Psych: Good mood and bright affect    NEUROLOGICAL EXAMINATION:    Mental Status: Alert and oriented to person, place, and time    Cranial Nerves:    II, III, IV, VI: Visual acuity grossly intact. Visual fields are normal.    Pupils are equal, round, and reactive to light and accommodation. Extra-ocular movements are full and fluid. Fundoscopic exam was benign, no ptosis or nystagmus. V-XII: Hearing is grossly intact. Facial features are symmetric, with normal sensation and strength. The palate rises symmetrically and the tongue protrudes midline. Motor Examination: Normal tone, bulk, and strength, 5/5 muscle strength throughout. Coordination: No resting or intention tremor    Gait and Station: Steady while walking. Normal arm swing. No pronator drift. No muscle wasting or fasiculations noted. Reflexes: DTRs 2+ throughout. Orders Placed This Encounter    busPIRone (BUSPAR) 10 MG tablet     Sig: Take 2 tablets by mouth 3 times daily    Montelukast Sodium (SINGULAIR PO)     Sig: Take by mouth    budesonide-formoterol (SYMBICORT) 80-4.5 MCG/ACT AERO     Si puffs Inhalation Once a day    NONFORMULARY     Sig: Medical marijuana topical cream daily    DISCONTD: Atogepant (QULIPTA PO)     Sig: Take by mouth    Rimegepant Sulfate (NURTEC) 75 MG TBDP     Sig: Take 75 mg by mouth every other day     Dispense:  16 tablet     Refill:  5    Diclofenac Potassium,Migraine, 50 MG PACK     Sig: Take 50 mg by mouth as needed (Take 1 pack at migraine onset)     Dispense:  9 each     Refill:  5        1. Intractable migraine with aura with status migrainosus      Since the patient cannot take a triptan or DHE due to being on an SSRI, we will attempt to get Nurtec ODT 75 mg every other day for migraine preventative approved. Sent for a  PA.   Patient would

## 2023-11-27 ENCOUNTER — TELEPHONE (OUTPATIENT)
Age: 43
End: 2023-11-27

## 2023-11-27 NOTE — TELEPHONE ENCOUNTER
RE: Cisco Hernandez prior authorization sent to [facility]       Status is approved       Key: [id number] - PA Case ID: [id number] -    Rx #: [id number]    Coverage Starts on: 11/27/2023    Coverage Ends on: 11/26/2024    No approval letter at this time     FYI to nurse

## 2023-11-28 ENCOUNTER — OFFICE VISIT (OUTPATIENT)
Dept: PRIMARY CARE CLINIC | Facility: CLINIC | Age: 43
End: 2023-11-28
Payer: MEDICAID

## 2023-11-28 ENCOUNTER — TELEPHONE (OUTPATIENT)
Age: 43
End: 2023-11-28

## 2023-11-28 VITALS
SYSTOLIC BLOOD PRESSURE: 116 MMHG | HEIGHT: 66 IN | BODY MASS INDEX: 42.33 KG/M2 | RESPIRATION RATE: 17 BRPM | DIASTOLIC BLOOD PRESSURE: 74 MMHG | OXYGEN SATURATION: 98 % | HEART RATE: 76 BPM | TEMPERATURE: 98.3 F | WEIGHT: 263.4 LBS

## 2023-11-28 DIAGNOSIS — Z13.220 SCREENING FOR HYPERLIPIDEMIA: ICD-10-CM

## 2023-11-28 DIAGNOSIS — E66.01 SEVERE OBESITY (HCC): ICD-10-CM

## 2023-11-28 DIAGNOSIS — M79.7 FIBROMYALGIA: ICD-10-CM

## 2023-11-28 DIAGNOSIS — Z13.1 SCREENING FOR DIABETES MELLITUS: ICD-10-CM

## 2023-11-28 DIAGNOSIS — Z23 NEED FOR IMMUNIZATION AGAINST INFLUENZA: Primary | ICD-10-CM

## 2023-11-28 DIAGNOSIS — G89.29 OTHER CHRONIC PAIN: ICD-10-CM

## 2023-11-28 PROCEDURE — 99214 OFFICE O/P EST MOD 30 MIN: CPT | Performed by: FAMILY MEDICINE

## 2023-11-28 PROCEDURE — 90674 CCIIV4 VAC NO PRSV 0.5 ML IM: CPT | Performed by: FAMILY MEDICINE

## 2023-11-28 PROCEDURE — 90471 IMMUNIZATION ADMIN: CPT | Performed by: FAMILY MEDICINE

## 2023-11-28 SDOH — ECONOMIC STABILITY: INCOME INSECURITY: HOW HARD IS IT FOR YOU TO PAY FOR THE VERY BASICS LIKE FOOD, HOUSING, MEDICAL CARE, AND HEATING?: PATIENT DECLINED

## 2023-11-28 SDOH — ECONOMIC STABILITY: HOUSING INSECURITY
IN THE LAST 12 MONTHS, WAS THERE A TIME WHEN YOU DID NOT HAVE A STEADY PLACE TO SLEEP OR SLEPT IN A SHELTER (INCLUDING NOW)?: PATIENT REFUSED

## 2023-11-28 SDOH — ECONOMIC STABILITY: FOOD INSECURITY: WITHIN THE PAST 12 MONTHS, YOU WORRIED THAT YOUR FOOD WOULD RUN OUT BEFORE YOU GOT MONEY TO BUY MORE.: PATIENT DECLINED

## 2023-11-28 SDOH — ECONOMIC STABILITY: FOOD INSECURITY: WITHIN THE PAST 12 MONTHS, THE FOOD YOU BOUGHT JUST DIDN'T LAST AND YOU DIDN'T HAVE MONEY TO GET MORE.: PATIENT DECLINED

## 2023-11-28 ASSESSMENT — PATIENT HEALTH QUESTIONNAIRE - PHQ9
SUM OF ALL RESPONSES TO PHQ QUESTIONS 1-9: 2
SUM OF ALL RESPONSES TO PHQ9 QUESTIONS 1 & 2: 2
2. FEELING DOWN, DEPRESSED OR HOPELESS: 1
SUM OF ALL RESPONSES TO PHQ QUESTIONS 1-9: 2
1. LITTLE INTEREST OR PLEASURE IN DOING THINGS: 1

## 2023-11-28 NOTE — PROGRESS NOTES
Timbo Cox is an 37 y.o. female who presents for follow up. Pmhx : Seizures, migraines. Chronic anterior uveitis R eye. FM, scoliosis, depression and anxiety. In the past year she unexpectedly lost her mother,  from liver failure. Lost her stepdaughter, due to drug overdose. Following with psychiatry and seeing a counselor. Migraines, seizure disorder. Following with neuro. GERD. Following with GI. Scheduled for EGD and colonoscopy in January. FM, chronic fatigue syndrome. Following with pain management. Allergies - reviewed:    Allergies   Allergen Reactions    Morphine Rash     Rash on back and legs    Tramadol Nausea And Vomiting    Doxycycline Nausea Only    Macadamia Nut Oil Swelling     Swollen lips    Other Swelling     All nuts except peanuts         Medications - reviewed:   Current Outpatient Medications   Medication Sig    busPIRone (BUSPAR) 10 MG tablet Take 2 tablets by mouth 3 times daily    Montelukast Sodium (SINGULAIR PO) Take by mouth    budesonide-formoterol (SYMBICORT) 80-4.5 MCG/ACT AERO 2 puffs Inhalation Once a day    NONFORMULARY Medical marijuana topical cream daily    Rimegepant Sulfate (NURTEC) 75 MG TBDP Take 75 mg by mouth every other day    Diclofenac Potassium,Migraine, 50 MG PACK Take 50 mg by mouth as needed (Take 1 pack at migraine onset)    cyclobenzaprine (FLEXERIL) 10 MG tablet TAKE 1 TABLET BY MOUTH THREE TIMES A DAY AS NEEDED FOR MUSCLE SPASM    diclofenac (VOLTAREN) 50 MG EC tablet Take 1 tablet by mouth 2 times daily as needed for Pain Due follow up (Patient taking differently: Take 1 tablet by mouth 3 times daily Due follow up)    albuterol (PROVENTIL) (2.5 MG/3ML) 0.083% nebulizer solution Inhale into the lungs every 4 hours as needed    escitalopram (LEXAPRO) 10 MG tablet Take 0.5 tablets by mouth daily ceived the following from [facility]: Outside name: escitalopram oxalate (LEXAPRO) 10 mg tablet

## 2023-11-29 ENCOUNTER — TELEPHONE (OUTPATIENT)
Age: 43
End: 2023-11-29

## 2023-11-29 NOTE — TELEPHONE ENCOUNTER
PT called to schedule a appt. I informed her that she'd be considered a NP due to [facility] leaving and not being seen since 2019 and at the moment we aren't taking NP's.  PT stated she understood

## 2023-11-30 LAB
ALBUMIN SERPL-MCNC: 4 G/DL (ref 3.9–4.9)
ALBUMIN/GLOB SERPL: 1.3 {RATIO} (ref 1.2–2.2)
ALP SERPL-CCNC: 69 IU/L (ref 44–121)
ALT SERPL-CCNC: 14 IU/L (ref 0–32)
AST SERPL-CCNC: 12 IU/L (ref 0–40)
BILIRUB SERPL-MCNC: <0.2 MG/DL (ref 0–1.2)
BUN SERPL-MCNC: 12 MG/DL (ref 6–24)
BUN/CREAT SERPL: 15 (ref 9–23)
CALCIUM SERPL-MCNC: 8.9 MG/DL (ref 8.7–10.2)
CHLORIDE SERPL-SCNC: 106 MMOL/L (ref 96–106)
CHOLEST SERPL-MCNC: 143 MG/DL (ref 100–199)
CO2 SERPL-SCNC: 24 MMOL/L (ref 20–29)
CREAT SERPL-MCNC: 0.81 MG/DL (ref 0.57–1)
EGFRCR SERPLBLD CKD-EPI 2021: 92 ML/MIN/1.73
ERYTHROCYTE [DISTWIDTH] IN BLOOD BY AUTOMATED COUNT: 13.1 % (ref 11.7–15.4)
GLOBULIN SER CALC-MCNC: 3.1 G/DL (ref 1.5–4.5)
GLUCOSE SERPL-MCNC: 121 MG/DL (ref 70–99)
HBA1C MFR BLD: 6.3 % (ref 4.8–5.6)
HCT VFR BLD AUTO: 36.4 % (ref 34–46.6)
HDLC SERPL-MCNC: 49 MG/DL
HGB BLD-MCNC: 11.7 G/DL (ref 11.1–15.9)
LDLC SERPL CALC-MCNC: 78 MG/DL (ref 0–99)
MCH RBC QN AUTO: 28.4 PG (ref 26.6–33)
MCHC RBC AUTO-ENTMCNC: 32.1 G/DL (ref 31.5–35.7)
MCV RBC AUTO: 88 FL (ref 79–97)
PLATELET # BLD AUTO: 354 X10E3/UL (ref 150–450)
POTASSIUM SERPL-SCNC: 4 MMOL/L (ref 3.5–5.2)
PROT SERPL-MCNC: 7.1 G/DL (ref 6–8.5)
RBC # BLD AUTO: 4.12 X10E6/UL (ref 3.77–5.28)
SODIUM SERPL-SCNC: 142 MMOL/L (ref 134–144)
TRIGL SERPL-MCNC: 86 MG/DL (ref 0–149)
VLDLC SERPL CALC-MCNC: 16 MG/DL (ref 5–40)
WBC # BLD AUTO: 5.6 X10E3/UL (ref 3.4–10.8)

## 2024-02-22 NOTE — TELEPHONE ENCOUNTER
Requested Prescriptions     Pending Prescriptions Disp Refills    diclofenac (VOLTAREN) 50 MG EC tablet [Pharmacy Med Name: DICLOFENAC SOD EC 50 MG TAB] 60 tablet 0     Sig: TAKE 1 TABLET BY MOUTH 2 TIMES DAILY AS NEEDED FOR PAIN DUE FOR FOLLOW UP        Last Visit 11/28/23  Last Refill  7/26/23

## 2024-04-04 ENCOUNTER — HOSPITAL ENCOUNTER (EMERGENCY)
Facility: HOSPITAL | Age: 44
Discharge: HOME | End: 2024-04-04
Attending: EMERGENCY MEDICINE | Admitting: EMERGENCY MEDICINE
Payer: MEDICAID

## 2024-04-04 ENCOUNTER — APPOINTMENT (OUTPATIENT)
Facility: HOSPITAL | Age: 44
End: 2024-04-04
Payer: MEDICAID

## 2024-04-04 VITALS
OXYGEN SATURATION: 97 % | HEIGHT: 66 IN | SYSTOLIC BLOOD PRESSURE: 132 MMHG | TEMPERATURE: 98.3 F | DIASTOLIC BLOOD PRESSURE: 85 MMHG | BODY MASS INDEX: 44.2 KG/M2 | RESPIRATION RATE: 18 BRPM | HEART RATE: 79 BPM | WEIGHT: 275 LBS

## 2024-04-04 DIAGNOSIS — R20.2 PARESTHESIA: Primary | ICD-10-CM

## 2024-04-04 DIAGNOSIS — M54.50 MIDLINE LOW BACK PAIN WITHOUT SCIATICA, UNSPECIFIED CHRONICITY: ICD-10-CM

## 2024-04-04 PROCEDURE — 6370000000 HC RX 637 (ALT 250 FOR IP)

## 2024-04-04 PROCEDURE — 99284 EMERGENCY DEPT VISIT MOD MDM: CPT

## 2024-04-04 PROCEDURE — 72100 X-RAY EXAM L-S SPINE 2/3 VWS: CPT

## 2024-04-04 PROCEDURE — 70450 CT HEAD/BRAIN W/O DYE: CPT

## 2024-04-04 RX ORDER — HYDROCODONE BITARTRATE AND ACETAMINOPHEN 5; 325 MG/1; MG/1
1 TABLET ORAL
Status: COMPLETED | OUTPATIENT
Start: 2024-04-04 | End: 2024-04-04

## 2024-04-04 RX ORDER — METHOCARBAMOL 750 MG/1
750 TABLET, FILM COATED ORAL
Qty: 15 TABLET | Refills: 0 | Status: SHIPPED | OUTPATIENT
Start: 2024-04-04

## 2024-04-04 RX ORDER — ACETAMINOPHEN 325 MG/1
650 TABLET ORAL
Status: COMPLETED | OUTPATIENT
Start: 2024-04-04 | End: 2024-04-04

## 2024-04-04 RX ORDER — ACETAMINOPHEN 500 MG
1000 TABLET ORAL
Qty: 60 TABLET | Refills: 0 | Status: SHIPPED | OUTPATIENT
Start: 2024-04-04 | End: 2024-05-04

## 2024-04-04 RX ORDER — LIDOCAINE 4 G/G
1 PATCH TOPICAL DAILY
Qty: 10 PATCH | Refills: 0 | Status: SHIPPED | OUTPATIENT
Start: 2024-04-04 | End: 2024-05-04

## 2024-04-04 RX ORDER — METHOCARBAMOL 500 MG/1
1500 TABLET, FILM COATED ORAL
Status: COMPLETED | OUTPATIENT
Start: 2024-04-04 | End: 2024-04-04

## 2024-04-04 RX ORDER — LIDOCAINE 4 G/G
1 PATCH TOPICAL ONCE
Status: DISCONTINUED | OUTPATIENT
Start: 2024-04-04 | End: 2024-04-04 | Stop reason: HOSPADM

## 2024-04-04 RX ADMIN — METHOCARBAMOL TABLETS 1500 MG: 500 TABLET, COATED ORAL at 17:45

## 2024-04-04 RX ADMIN — ACETAMINOPHEN 650 MG: 325 TABLET ORAL at 17:45

## 2024-04-04 RX ADMIN — HYDROCODONE BITARTRATE AND ACETAMINOPHEN 1 TABLET: 5; 325 TABLET ORAL at 17:45

## 2024-04-04 ASSESSMENT — LIFESTYLE VARIABLES
HOW OFTEN DO YOU HAVE A DRINK CONTAINING ALCOHOL: NEVER
HOW MANY STANDARD DRINKS CONTAINING ALCOHOL DO YOU HAVE ON A TYPICAL DAY: PATIENT DOES NOT DRINK

## 2024-04-04 ASSESSMENT — PAIN SCALES - GENERAL: PAINLEVEL_OUTOF10: 10

## 2024-04-04 ASSESSMENT — PAIN - FUNCTIONAL ASSESSMENT: PAIN_FUNCTIONAL_ASSESSMENT: 0-10

## 2024-04-04 NOTE — ED PROVIDER NOTES
[facility]GENCY DEPT  EMERGENCY DEPARTMENT ENCOUNTER      Pt Name: Mara Francisco  MRN: [id number]  Birthdate 1980  Date of evaluation: 2024  Provider: Mona Linn PA-C    CHIEF COMPLAINT       Chief Complaint   Patient presents with    Back Pain         HISTORY OF PRESENT ILLNESS   (Location/Symptom, Timing/Onset, Context/Setting, Quality, Duration, Modifying Factors, Severity)  Note limiting factors.   The history is provided by the patient.       Mara Francisco is a 43 y.o. female with Hx of scoliosis, fibromyalgia, migraine, seizure, Lyme who presents ambulatory to [facility] ED with cc of acute on chronic lumbar pain for 1 month.  Diclofenac and Flexeril without relief.  Patient also notes right-sided face and hand numbness (initially said 2 days, then told CT tech 1 week duration).  No trauma, bowel or bladder changes, any concerns this time.      PCP: Andreea Bentley,     There are no other complaints, changes or physical findings at this time.    Review of External Medical Records:     Nursing Notes were reviewed.    REVIEW OF SYSTEMS    (2-9 systems for level 4, 10 or more for level 5)     Review of Systems   All other systems reviewed and are negative.      Except as noted above the remainder of the review of systems was reviewed and negative.       PAST MEDICAL HISTORY     Past Medical History:   Diagnosis Date    Anxiety     Arthritis     Depression     Headache     Ill-defined condition     scoliosis, gall stones, anemia    Lyme disease     Scoliosis     Seizures (HCC)          SURGICAL HISTORY       Past Surgical History:   Procedure Laterality Date     SECTION      x 4    CHOLECYSTECTOMY  2017    HERNIA REPAIR      OTHER SURGICAL HISTORY      PIC line placed for Lymes     TUBAL LIGATION           CURRENT MEDICATIONS       Discharge Medication List as of 2024  6:08 PM        CONTINUE these medications which have NOT CHANGED    Details   diclofenac (VOLTAREN)

## 2024-04-04 NOTE — ED TRIAGE NOTES
Patient arrives to ED ambulatory w/o difficulty. No acute distress noted in triage. A&O x 4. Skin is warm, dry & intact on obs. Pt arrives cc back pain x 1 month that radiates throughout body. Pt has hx of scoliosis. Pt reports taking diclofenac and flexeril with relief.

## 2024-04-24 DIAGNOSIS — G43.111 INTRACTABLE MIGRAINE WITH AURA WITH STATUS MIGRAINOSUS: ICD-10-CM

## 2024-04-24 RX ORDER — DICLOFENAC POTASSIUM 50 MG/1
50 POWDER, FOR SOLUTION ORAL PRN
Qty: 9 EACH | Refills: 5 | Status: SHIPPED | OUTPATIENT
Start: 2024-04-24

## 2024-07-02 ENCOUNTER — TELEPHONE (OUTPATIENT)
Age: 44
End: 2024-07-02

## 2024-07-02 ENCOUNTER — OFFICE VISIT (OUTPATIENT)
Age: 44
End: 2024-07-02

## 2024-07-02 DIAGNOSIS — Z91.199 NO-SHOW FOR APPOINTMENT: Primary | ICD-10-CM

## 2024-07-02 NOTE — TELEPHONE ENCOUNTER
Patient would like to reschedule her 07/02/24 NP appointment due to transportation concerns and also her testing date 07/18/24.

## 2024-09-20 ENCOUNTER — OFFICE VISIT (OUTPATIENT)
Age: 44
End: 2024-09-20
Payer: MEDICAID

## 2024-09-20 VITALS
HEART RATE: 67 BPM | SYSTOLIC BLOOD PRESSURE: 124 MMHG | OXYGEN SATURATION: 96 % | TEMPERATURE: 97.3 F | RESPIRATION RATE: 18 BRPM | HEIGHT: 66 IN | DIASTOLIC BLOOD PRESSURE: 69 MMHG | BODY MASS INDEX: 41.62 KG/M2 | WEIGHT: 259 LBS

## 2024-09-20 DIAGNOSIS — M79.7 FIBROMYALGIA: ICD-10-CM

## 2024-09-20 DIAGNOSIS — G43.111 INTRACTABLE MIGRAINE WITH AURA WITH STATUS MIGRAINOSUS: Primary | ICD-10-CM

## 2024-09-20 PROCEDURE — 99214 OFFICE O/P EST MOD 30 MIN: CPT

## 2024-09-20 RX ORDER — FLUTICASONE FUROATE, UMECLIDINIUM BROMIDE AND VILANTEROL TRIFENATATE 200; 62.5; 25 UG/1; UG/1; UG/1
1 POWDER RESPIRATORY (INHALATION) DAILY
COMMUNITY
Start: 2024-09-11

## 2024-09-20 RX ORDER — METHOTREXATE 2.5 MG/1
2.5 TABLET ORAL WEEKLY
COMMUNITY
Start: 2024-08-07

## 2024-09-20 RX ORDER — RIMEGEPANT SULFATE 75 MG/75MG
75 TABLET, ORALLY DISINTEGRATING ORAL EVERY OTHER DAY
Qty: 16 TABLET | Refills: 6 | Status: SHIPPED | OUTPATIENT
Start: 2024-09-20

## 2024-09-20 RX ORDER — MAGNESIUM OXIDE 400 MG/1
400 TABLET ORAL DAILY
Qty: 90 TABLET | Refills: 1 | Status: SHIPPED | OUTPATIENT
Start: 2024-09-20

## 2024-09-20 RX ORDER — FOLIC ACID 1 MG/1
1 TABLET ORAL DAILY
COMMUNITY
Start: 2024-01-08

## 2024-09-20 RX ORDER — PRAZOSIN HYDROCHLORIDE 1 MG/1
1 CAPSULE ORAL NIGHTLY
COMMUNITY
Start: 2024-01-08

## 2024-09-20 RX ORDER — GABAPENTIN 300 MG/1
300 CAPSULE ORAL 3 TIMES DAILY
COMMUNITY
Start: 2024-01-08

## 2024-09-20 RX ORDER — VORTIOXETINE 20 MG/1
20 TABLET, FILM COATED ORAL EVERY MORNING
COMMUNITY
Start: 2024-08-05

## 2024-12-03 ENCOUNTER — TELEMEDICINE (OUTPATIENT)
Age: 44
End: 2024-12-03
Payer: MEDICAID

## 2024-12-03 DIAGNOSIS — F32.A ANXIETY AND DEPRESSION: ICD-10-CM

## 2024-12-03 DIAGNOSIS — F41.9 ANXIETY AND DEPRESSION: ICD-10-CM

## 2024-12-03 DIAGNOSIS — G31.84 MILD COGNITIVE IMPAIRMENT: Primary | ICD-10-CM

## 2024-12-03 PROCEDURE — 90791 PSYCH DIAGNOSTIC EVALUATION: CPT | Performed by: CLINICAL NEUROPSYCHOLOGIST

## 2024-12-03 NOTE — PROGRESS NOTES
Mara Francisco, was evaluated through a synchronous (real-time) audio-video encounter. The patient (or guardian if applicable) is aware that this is a billable service, which includes applicable co-pays. This Virtual Visit was conducted with patient's (and/or legal guardian's) consent. Patient identification was verified, and a caregiver was present when appropriate.   The patient was located at Home: [address] [place] [id number]  Provider was located at Facility (Appt Dept): [address]  [address]  [place] [address]  Confirm you are appropriately licensed, registered, or certified to deliver care in the state where the patient is located as indicated above. If you are not or unsure, please re-schedule the visit: Yes, I confirm.     Mara Francisco (:  1980) is a New patient, presenting virtually for evaluation of the following:      Below is the assessment and plan developed based on review of pertinent history, physical exam, labs, studies, and medications.       [facility]EMERGENCY C[facility]  NEUROLOGY CLINIC   [address]   [place], [place] [address] [phone] Office   [phone] Fax      Neuropsychology    Initial Diagnostic Interview Note      Referral:  PCP    Mara Francisco is a 44 y.o. right handed partnered  female who was unaccompanied to the initial clinical interview on 12/3/2024 .  Please refer to her medical records for details pertaining to her history.   At the start of the appointment, I reviewed the patient's [facility] Epic Chart (including Media scanned in from previous providers) for the active Problem List, all pertinent Past Medical Hx, medications, recent radiologic and laboratory findings.  In addition, I reviewed pt's documented Immunization Record and Encounter History.     Chief Complaint: New patient, establish care, for

## 2024-12-06 ENCOUNTER — PROCEDURE VISIT (OUTPATIENT)
Age: 44
End: 2024-12-06
Payer: MEDICAID

## 2024-12-06 DIAGNOSIS — G31.84 MILD COGNITIVE IMPAIRMENT: Primary | ICD-10-CM

## 2024-12-06 DIAGNOSIS — F32.A ANXIETY AND DEPRESSION: ICD-10-CM

## 2024-12-06 DIAGNOSIS — F90.0 ATTENTION DEFICIT HYPERACTIVITY DISORDER (ADHD), INATTENTIVE TYPE, MODERATE: Chronic | ICD-10-CM

## 2024-12-06 DIAGNOSIS — F45.42 PAIN DISORDER ASSOCIATED WITH PSYCHOLOGICAL AND PHYSICAL FACTORS: ICD-10-CM

## 2024-12-06 DIAGNOSIS — F41.9 ANXIETY AND DEPRESSION: ICD-10-CM

## 2024-12-06 PROCEDURE — 96139 PSYCL/NRPSYC TST TECH EA: CPT | Performed by: CLINICAL NEUROPSYCHOLOGIST

## 2024-12-06 PROCEDURE — 96138 PSYCL/NRPSYC TECH 1ST: CPT | Performed by: CLINICAL NEUROPSYCHOLOGIST

## 2024-12-06 PROCEDURE — 96132 NRPSYC TST EVAL PHYS/QHP 1ST: CPT | Performed by: CLINICAL NEUROPSYCHOLOGIST

## 2024-12-06 PROCEDURE — 96133 NRPSYC TST EVAL PHYS/QHP EA: CPT | Performed by: CLINICAL NEUROPSYCHOLOGIST

## 2024-12-07 NOTE — PROGRESS NOTES
30 minutes and each additional 30 minutes.     [id number]:  Neurobehavioral Status Exam, Clinical interview.  Clinical assessment of thinking, reasoning and judgment, by neuropsychologist, both face to face time with patient and time interpreting those test results and reporting, first and subsequent hours. Record review.  Review of history provided by patient.  Review of collaborative information.  Testing by Clinician.  Review of raw data. Scoring. Report writing of individual tests administered by Clinician.  Integration of individual tests administered by psychometrist with NSE/testing by clinician, review of records/history/collaborative information, case Conceptualization, treatment planning, clinical decision making, report writing, coordination Of Care. Psychometry test codes as time spent by psychometrist administering and scoring neurocognitive/psychological tests under supervision of neuropsychologist.  Integral services including scoring of raw data, data interpretation, case conceptualization, report writing etcetera were initiated after the patient finished testing/raw data collected and was completed on the date the report was signed.

## 2025-01-20 ENCOUNTER — TELEMEDICINE (OUTPATIENT)
Age: 45
End: 2025-01-20

## 2025-01-20 DIAGNOSIS — F90.0 ATTENTION DEFICIT HYPERACTIVITY DISORDER (ADHD), INATTENTIVE TYPE, MODERATE: ICD-10-CM

## 2025-01-20 DIAGNOSIS — G31.84 MILD COGNITIVE IMPAIRMENT: Primary | ICD-10-CM

## 2025-01-20 DIAGNOSIS — M79.7 FIBROMYALGIA: ICD-10-CM

## 2025-01-20 DIAGNOSIS — F32.A ANXIETY AND DEPRESSION: ICD-10-CM

## 2025-01-20 DIAGNOSIS — F41.9 ANXIETY AND DEPRESSION: ICD-10-CM

## 2025-01-20 DIAGNOSIS — F45.42 PAIN DISORDER ASSOCIATED WITH PSYCHOLOGICAL AND PHYSICAL FACTORS: ICD-10-CM

## 2025-01-20 NOTE — PROGRESS NOTES
with respect to neurocognitive functioning.  In this regard, the only impairments noted were for sustained visual attention and bilateral motor skills.  Otherwise, her performances across all other neurocognitive domains assessed are normal.  From an emotional standpoint, there is severe major depression and severe anxiety.  Lengthier assessment of personality testing could not be further interpreted due to a high level of patient response inconsistencies.  There is likely a somatic element to her physical pain issues.  Certainly, those mood concerns are causing functional memory problems.                 It is my opinion that the patient's reported (but not clinically corroborated) day-to-day memory problems are secondary to emotional distress, physical pain, and a chronic underlying attention deficit issue..  In addition to continued medical care, my recommendations include a review of her psychiatric medication management for depression and anxiety. She should be participating in at least weekly psychotherapy.  Consider also an appropriate medication for attention if this is not medically contraindicated but caution is advised in selecting same, given the anxiety here.  Furthermore, unless anxiety issues are treated, she will continue to have functional cognitive deficits, regardless of whether or not she is treated for attention deficit.  Otherwise, I hope that the patient is reassured that there is little evidence of an organic brain problem.  I am not concerned about capacity, driving, day-to-day supervision, etc.  The patient should be encouraged to remain as mentally, physically, and socially active as possible.  Baseline now established.  Follow up as needed.  Clinical correlation is, of course, indicated.                 I will discuss these findings with the patient when she follows up with me in the near future.  A follow up Neuropsychological Evaluation is indicated on a prn basis, especially if there

## 2025-03-04 DIAGNOSIS — G43.111 INTRACTABLE MIGRAINE WITH AURA WITH STATUS MIGRAINOSUS: ICD-10-CM

## 2025-03-04 RX ORDER — RIMEGEPANT SULFATE 75 MG/75MG
75 TABLET, ORALLY DISINTEGRATING ORAL EVERY OTHER DAY
Qty: 16 TABLET | Refills: 0 | Status: ACTIVE | OUTPATIENT
Start: 2025-03-04

## 2025-03-04 RX ORDER — DICLOFENAC POTASSIUM 50 MG/1
50 POWDER, FOR SOLUTION ORAL PRN
Qty: 9 EACH | Refills: 0 | Status: SHIPPED | OUTPATIENT
Start: 2025-03-04

## 2025-06-05 ENCOUNTER — TELEMEDICINE (OUTPATIENT)
Age: 45
End: 2025-06-05
Payer: MEDICAID

## 2025-06-05 DIAGNOSIS — G43.111 INTRACTABLE MIGRAINE WITH AURA WITH STATUS MIGRAINOSUS: ICD-10-CM

## 2025-06-05 PROCEDURE — 99214 OFFICE O/P EST MOD 30 MIN: CPT

## 2025-06-05 RX ORDER — DICLOFENAC POTASSIUM 50 MG/1
50 POWDER, FOR SOLUTION ORAL PRN
Qty: 9 EACH | Refills: 5 | Status: SHIPPED | OUTPATIENT
Start: 2025-06-05

## 2025-06-05 RX ORDER — RIMEGEPANT SULFATE 75 MG/75MG
75 TABLET, ORALLY DISINTEGRATING ORAL EVERY OTHER DAY
Qty: 16 TABLET | Refills: 5 | Status: ACTIVE | OUTPATIENT
Start: 2025-06-05

## 2025-06-05 NOTE — PROGRESS NOTES
2025    TELEHEALTH EVALUATION -- Audio/Visual    HPI:    Mara Francisco (:  1980) has requested an audio/video evaluation for the following concern(s):    Patient is being seen today for migraine follow-up.  Patient was last seen 2020 for at which time she was established on Nurtec ODT every other day for migraine prevention and had Cambia for rescue therapy.  The patient had seen an increase in her migraine frequency likely due to stopping caffeine and changing her diet by reducing sugar and eating less processed foods.  She was about to receive a CPAP machine for sleep apnea.  She had just started gabapentin for fibromyalgia.    Today the patient tells me that unfortunately she has been without her Nurtec for 3 months now.  She has been using more Cambia than usual.  She tells me that when she was consistently taking Nurtec every other day for prevention she had maybe 1 severe migraine a month but now that she has been off of the Nurtec she is having 1 severe migraine a week.  She says that she tries to remedy the headache with ice packs or warm compresses before she uses her Cambia but the Cambia does help to abort the migraine quickly within about 5 minutes or so.  She says that her headaches are on the left frontal area of her head and feels like she has been hit in the head.  She tells me that her triggers are changes in her atmosphere such as increased lighting.  She is still eating better than she was previously and knows that certain foods will cause migraines such as milk.    Review of Systems    Prior to Visit Medications    Medication Sig Taking? Authorizing Provider   rimegepant sulfate (NURTEC) 75 MG TBDP Take 75 mg by mouth every other day Yes Angelia Sandoval, SRINIVAS - NP   Diclofenac Potassium,Migraine, 50 MG PACK Take 50 mg by mouth as needed (Take 1 pack at migraine onset) Yes Angelia Sandoval APRN - NP   TRINTELLIX 20 MG TABS tablet Take 1 tablet by mouth every morning

## 2025-07-08 ENCOUNTER — TELEPHONE (OUTPATIENT)
Age: 45
End: 2025-07-08

## 2025-07-08 NOTE — TELEPHONE ENCOUNTER
RE:Diclofenac sodium 50 mg packets prior authorization request sent to [employer] med[employer] via [facility]    (Key: [id number])    PA Case ID #: [id number]    Rx #: [id number]    Status approved     Coverage Starts on: 7/8/2025     Coverage Ends on: 7/8/2026     Approval letter uploaded to media     FYI to nurse